# Patient Record
Sex: FEMALE | Race: WHITE | NOT HISPANIC OR LATINO | Employment: OTHER | URBAN - METROPOLITAN AREA
[De-identification: names, ages, dates, MRNs, and addresses within clinical notes are randomized per-mention and may not be internally consistent; named-entity substitution may affect disease eponyms.]

---

## 2017-01-17 ENCOUNTER — TRANSFERRED RECORDS (OUTPATIENT)
Dept: HEALTH INFORMATION MANAGEMENT | Facility: CLINIC | Age: 62
End: 2017-01-17

## 2017-07-26 DIAGNOSIS — F32.A DEPRESSION, UNSPECIFIED DEPRESSION TYPE: ICD-10-CM

## 2017-07-26 NOTE — LETTER
July 27, 2017      TO: Poornima Hilton  [address] [place] [address]     Dear Ms. Soniah Lida,    This letter is a reminder that you are overdue to see your Primary Care Provider for an Annual Visit and needed labs. You must be seen by your Primary Care Provider on a yearly basis and have appropriate labs drawn for continued care and prescription refills. Please call [phone] to schedule an appointment for an Annual Visit with EDGAR LOPEZ MD.  LAST VISIT 7/20/16    You have been given a 30 day supply/refill of your buPROPion (WELLBUTRIN SR) 100 MG   while you get your clinic visit/labs completed.    [facility],  Primary Care Center

## 2017-07-27 RX ORDER — BUPROPION HYDROCHLORIDE 100 MG/1
100 TABLET, EXTENDED RELEASE ORAL 2 TIMES DAILY
Qty: 60 TABLET | Refills: 0 | Status: SHIPPED | OUTPATIENT
Start: 2017-07-27 | End: 2017-09-22

## 2017-07-27 NOTE — TELEPHONE ENCOUNTER
buPROPion        Last Written Prescription Date:  7/20/16  Last Fill Quantity: 200,   # refills: 3  Last Office Visit : 7/20/16  Future Office visit:  NONE  OVERDUE MD APPT.  LETTER + 30 DAY RF

## 2017-08-21 ENCOUNTER — TELEPHONE (OUTPATIENT)
Dept: ORTHOPEDICS | Facility: CLINIC | Age: 62
End: 2017-08-21

## 2017-08-21 ENCOUNTER — PRE VISIT (OUTPATIENT)
Dept: ORTHOPEDICS | Facility: CLINIC | Age: 62
End: 2017-08-21

## 2017-08-21 NOTE — TELEPHONE ENCOUNTER
Patient calls in this am to get an appointment with an orthopedic surgeon. On the night of 8/19/2017, the patient stepped on a stone at a campsite, rolled her ankle. She sustained a closed trimalleolar left ankle fracture. She was initially seen and hospitalized in [place] where she had the ankle reduced and splinted. Poornima lives in the [facility] and would like to have her ankle fixed at the [place]. She will be discharged from the hospital today.   It was recommended that she hand carry all of the images, ED notes, and the hospital records. An appointment was scheduled for 0700 with Dr. Watson on 8/22/2017.

## 2017-08-21 NOTE — TELEPHONE ENCOUNTER
1.  Date/reason for appt: 8/22/17 7AM L ankle fx-images and records will be hand carried by the pt   2.  Referring provider: Self   3.  Call to patient (Yes / No - short description): Yes, called and spoke with pt. Emailing SALLIE form for pt to sign. - 1st attempt   4.  Previous care at / records requested from:  [facility] Severson MD / Evelyn (Ortho Surgeon) - Emailed SALLIE ([email or web address])

## 2017-08-21 NOTE — TELEPHONE ENCOUNTER
Called [facility] and spoke with Rosi. She will fax recs right away and push images through to [facility].

## 2017-08-22 ENCOUNTER — OFFICE VISIT (OUTPATIENT)
Dept: ORTHOPEDICS | Facility: CLINIC | Age: 62
End: 2017-08-22

## 2017-08-22 ENCOUNTER — ANESTHESIA EVENT (OUTPATIENT)
Dept: SURGERY | Facility: CLINIC | Age: 62
End: 2017-08-22

## 2017-08-22 ENCOUNTER — OFFICE VISIT (OUTPATIENT)
Dept: SURGERY | Facility: CLINIC | Age: 62
End: 2017-08-22

## 2017-08-22 VITALS
BODY MASS INDEX: 24.99 KG/M2 | HEIGHT: 65 IN | WEIGHT: 150 LBS | RESPIRATION RATE: 16 BRPM | OXYGEN SATURATION: 100 % | DIASTOLIC BLOOD PRESSURE: 78 MMHG | TEMPERATURE: 98 F | SYSTOLIC BLOOD PRESSURE: 124 MMHG | HEART RATE: 77 BPM

## 2017-08-22 VITALS — BODY MASS INDEX: 24.99 KG/M2 | HEIGHT: 65 IN | WEIGHT: 150 LBS

## 2017-08-22 DIAGNOSIS — Z01.818 PREOP EXAMINATION: Primary | ICD-10-CM

## 2017-08-22 DIAGNOSIS — Z86.69 HISTORY OF GUILLAIN-BARRE SYNDROME: ICD-10-CM

## 2017-08-22 DIAGNOSIS — H40.1231 NORMAL TENSION GLAUCOMA OF BOTH EYES, MILD STAGE: ICD-10-CM

## 2017-08-22 DIAGNOSIS — S82.892A CLOSED LEFT ANKLE FRACTURE, INITIAL ENCOUNTER: Primary | ICD-10-CM

## 2017-08-22 RX ORDER — OXYCODONE AND ACETAMINOPHEN 5; 325 MG/1; MG/1
1-2 TABLET ORAL EVERY 4 HOURS PRN
COMMUNITY
End: 2017-09-22

## 2017-08-22 RX ORDER — LATANOPROST 50 UG/ML
1 SOLUTION/ DROPS OPHTHALMIC AT BEDTIME
Qty: 7.5 ML | Refills: 1 | Status: SHIPPED | OUTPATIENT
Start: 2017-08-22 | End: 2018-01-02

## 2017-08-22 ASSESSMENT — ENCOUNTER SYMPTOMS: DEPRESSION: 1

## 2017-08-22 NOTE — MR AVS SNAPSHOT
After Visit Summary   8/22/2017    Poornima Hilton    MRN: [id number]           Patient Information     Date Of Birth          1955        Visit Information        Provider Department      8/22/2017 7:00 AM Scooter Watson MD [facility]        Today's Diagnoses     Closed left ankle fracture, initial encounter    -  1       Follow-ups after your visit        Your next 10 appointments already scheduled     Sep 13, 2017 11:30 AM CDT   (Arrive by 11:15 AM)   Post-Op with  U Ortho Nurse   [facility] ([facility])    [address] [place] [address]   [phone]            Sep 22, 2017 12:40 PM CDT   (Arrive by 12:25 PM)   PHYSICAL with Maxim Marcum MD   [facility] ([facility])    [address] [place] [address]   [phone]            Oct 10, 2017  9:10 AM CDT   (Arrive by 8:55 AM)   POST-OP FOOT/ANKLE with Scooter Watson MD   [facility] ([facility])    [address] [place] [address]   [phone]            Nov 02, 2017  9:15 AM CDT   VISUAL FIELD with [facility] EYE VISUAL FIELD   Eye Clinic ([facility])    [address]  [address]  [facility] [address]   [phone]            Nov 02, 2017 10:00 AM CDT   RETURN GLAUCOMA with Felicia Abreu MD   Eye Clinic ([facility])    [place]  [address]  [facility] [address]   [phone]              Who to contact     Please call your clinic at [phone] to:    Ask questions about your health    Make or cancel appointments    Discuss your medicines    Learn about your test results    Speak to your doctor   If you have compliments or concerns about an experience at your clinic, or if you wish to file a complaint,  "please contact [facility] Patient Relations at [phone] or email us at [email or web address]         Additional Information About Your Visit        exoro systemhart Information     [employer] gives you secure access to your electronic health record. If you see a primary care provider, you can also send messages to your care team and make appointments. If you have questions, please call your primary care clinic.  If you do not have a primary care provider, please call [phone] and they will assist you.      [employer] is an electronic gateway that provides easy, online access to your medical records. With [employer], you can request a clinic appointment, read your test results, renew a prescription or communicate with your care team.     To access your existing account, please contact your [facility] Clinic or call [phone] for assistance.        Care EveryWhere ID     This is your Care EveryWhere ID. This could be used by other organizations to access your [place] medical records  [id number]        Your Vitals Were     Height BMI (Body Mass Index)                1.651 m (5' 5\") 24.96 kg/m2           Blood Pressure from Last 3 Encounters:   08/22/17 124/78   07/20/16 122/80   05/09/14 127/78    Weight from Last 3 Encounters:   08/22/17 68 kg (150 lb)   08/22/17 68 kg (150 lb)   07/20/16 67.1 kg (148 lb)              We Performed the Following     Yaz-Operative Worksheet (Walter)          Today's Medication Changes          These changes are accurate as of: 8/22/17 11:59 PM.  If you have any questions, ask your nurse or doctor.               Start taking these medicines.        Dose/Directions    * order for DME   Used for:  Closed left ankle fracture, initial encounter   Started by:  Scooter Watson MD        Wheelchair with leg rests   Weight: 150 lbs  Height: 5'5\"   Quantity:  1 Units   Refills:  0       * order for DME   Used for:  Closed left ankle " fracture, initial encounter   Started by:  Watson, Scooter Tyrone, MD        Roll-A-Bout Walker. Patient can use for 2 months   Quantity:  1 Units   Refills:  0       * Notice:  This list has 2 medication(s) that are the same as other medications prescribed for you. Read the directions carefully, and ask your doctor or other care provider to review them with you.      These medicines have changed or have updated prescriptions.        Dose/Directions    glucosamine chondroitin 500 complex Caps   This may have changed:  when to take this   Used for:  Osteoarthritides        Dose:  1500 mg   Take 1,500 mg by mouth daily.   Quantity:  200 capsule   Refills:  99            Where to get your medicines      These medications were sent to [id number] - [place], [place] - [address] [facility]  [facility] [address], [place] [address]     Phone:  [phone]     latanoprost 0.005 % ophthalmic solution         Some of these will need a paper prescription and others can be bought over the counter.  Ask your nurse if you have questions.     Bring a paper prescription for each of these medications     order for DME    order for DME                Primary Care Provider Office Phone # Fax #    Maxim Marcum [phone]       [address] [place] [address]  [facility] [address]        Equal Access to Services     ERASMO GUERRIER AH: Hadii ruth berumeno Sojoe, waaxda luqadaha, qaybta kaalmada adeegyada, rehan kumar. So [facility] [phone].    ATENCIÓN: Si habla español, tiene a figueredo disposición servicios gratuitos de asistencia lingüística. Llame al [phone].    We comply with applicable federal civil rights laws and [place] laws. We do not discriminate on the basis of race, color, national origin, age, disability sex, sexual orientation or gender identity.            Thank you!     Thank you for choosing [facility] ORTHOP[facility]  "CLINIC  for your care. Our goal is always to provide you with excellent care. Hearing back from our patients is one way we can continue to improve our services. Please take a few minutes to complete the written survey that you may receive in the mail after your visit with us. Thank you!             Your Updated Medication List - Protect others around you: Learn how to safely use, store and throw away your medicines at www.disposemymeds.org.          This list is accurate as of: 8/22/17 11:59 PM.  Always use your most recent med list.                   Brand Name Dispense Instructions for use Diagnosis    aspirin 81 MG tablet      Take 1 tablet by mouth every morning    Routine general medical examination at a health care facility       buPROPion 100 MG 12 hr tablet    WELLBUTRIN SR    60 tablet    Take 1 tablet (100 mg) by mouth 2 times daily *LETTER TO PT*PLEASE SCHEDULE MD APPT.    Depression, unspecified depression type       calcium 600 MG tablet      Take 1 tablet by mouth 2 times daily.    Routine general medical examination at a health care facility       glucosamine chondroitin 500 complex Caps     200 capsule    Take 1,500 mg by mouth daily.    Osteoarthritides       latanoprost 0.005 % ophthalmic solution    XALATAN    7.5 mL    Place 1 drop into both eyes At Bedtime    Normal tension glaucoma of both eyes, mild stage       MULTIVITAMINS PO      Take by mouth every morning        * order for DME     1 Units    Wheelchair with leg rests   Weight: 150 lbs  Height: 5'5\"    Closed left ankle fracture, initial encounter       * order for DME     1 Units    Roll-A-Bout Walker. Patient can use for 2 months    Closed left ankle fracture, initial encounter       PERCOCET 5-325 MG per tablet   Generic drug:  oxyCODONE-acetaminophen      Take 1-2 tablets by mouth every 4 hours as needed for moderate to severe pain        TYLENOL 325 MG tablet   Generic drug:  acetaminophen      Take 1-2 tablets by mouth as needed.    "     * Notice:  This list has 2 medication(s) that are the same as other medications prescribed for you. Read the directions carefully, and ask your doctor or other care provider to review them with you.

## 2017-08-22 NOTE — NURSING NOTE
Teaching Flowsheet   Relevant Diagnosis: Left ankle fracture   Teaching Topic: Pre-op left ankle open reduction internal fixation      Person(s) involved in teaching:   Patient and      Motivation Level:  Asks Questions: Yes  Eager to Learn: Yes  Cooperative: Yes  Receptive (willing/able to accept information): Yes  Any cultural factors/Congregation beliefs that may influence understanding or compliance? No       Patient and Family demonstrates understanding of the following:  Reason for the appointment, diagnosis and treatment plan: Yes  Knowledge of proper use of medications and conditions for which they are ordered (with special attention to potential side effects or drug interactions): Yes  Which situations necessitate calling provider and whom to contact: Yes       Teaching Concerns Addressed:   Comments: Patient given order for wheelchair and roll-a-bout. Also given handicap parking form.  Will have surgery Friday at [facility].  Patient scheduling pre-op physical with primary today, they were instructed to contact us with any difficulty with this.       Proper use and care of post op cam boot (medical equip, care aids, etc.): Yes  Nutritional needs and diet plan: Yes  Pain management techniques: Yes  Wound Care: Yes  How and/when to access community resources: NA     Instructional Materials Used/Given: pre-op packet, TRIA packet, antiseptic soap, post-operative foot and ankle surgery instructions.      Time spent with patient: 15 minutes.

## 2017-08-22 NOTE — TELEPHONE ENCOUNTER
latanoprost (XALATAN) 0.005 % ophthalmic solution   Last Written Prescription Date:  5/13/16  Last Fill Quantity: 3 bottles,   # refills: 3  Last Office Visit with G, [facility] or [facility] prescribing provider: 12/1/16  Future Office visit:   11/2/17    Progress Notes      1)POAG/LTG vs Glc Suspect -- H/O DH OS, ??H/O Raynaud's per old notes, H/O low BP, anemia in past -- K pachy: 583/589   Tmax:20/19     HVF: Full c fluct     CDR: 0.7/0.8    HRT/OCT:tr RNFL thinning       FHX of Glc: Mother -- had surgery, lost vision     Gonio: open      Intolerant to:      Asthma/COPD: No  Steroid Use:No     Kidney Stones:  No   Sulfa Allergy: No     IOP targets:     Patient will continue on Latanoprost which is a teal top drop at bedtime in both eyes.  Patient will return to clinic in 8-12 months with repeat visual field test, OCT with RNFL analysis, and dilated eye exam.

## 2017-08-22 NOTE — NURSING NOTE
Reason For Visit:   Chief Complaint   Patient presents with     Musculoskeletal Problem     L ankle fx. DOI 8/19/17.           Pain Assessment  Patient Currently in Pain: No

## 2017-08-22 NOTE — H&P
Pre-Operative H & P     CC:  Preoperative exam to assess for increased cardiopulmonary risk while undergoing surgery and anesthesia.    Date of Encounter: 8/22/2017  Primary Care Physician:  Maxim Marcum  Poornima Hilton is a 62 year old female who presents for pre-operative H & P in preparation for left ankle open reduction internal fixation with Dr. Watson on 8/25/17 at [facility]. History is obtained from the patient.     Patient was camping last weekend and tripped on a rock at their campsite. She suffered a left ankle fracture and was initially seen in a local ED. She was then referred to [facility] where she underwent a closed reduction on 8/20/17. She then consulted with Dr. Watson today for further management. Above procedure now planned.    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 Symptoms began with peripheral neuropathy in feet moving into upper extremities. No further progression or respiratory issues. Neurologist at that time said that it may have been a mild case of Guillian [place]. Later some peripheral neuropathy returned and she had repeat evaluation which was neg. No symptoms since that time.     Patient is well followed by her PCP, Dr. Marcum.    Past Medical History  Past Medical History:   Diagnosis Date     Closed left ankle fracture 08/19/2017     Depressive disorder, not elsewhere classified     Depression (non-psychotic)     Guillaine Mountain syndrome 2000     Mild recurrent major depression (H) 10/1/2012     Osteoarthritis of carpometacarpal joint of thumb     bilaterally     Primary open angle glaucoma of both eyes, mild stage 12/01/2016       Past Surgical History  Past Surgical History:   Procedure Laterality Date     ORIF left ankle  08/20/2017       Hx of Blood transfusions/reactions: Denies.      Hx of abnormal bleeding or anti-platelet use: ASA 81 mg, on hold today per Dr. Watson.    Menstrual history: No LMP recorded. Patient is  "postmenopausal.    Steroid use in the last year: Denies.     Personal or FH with difficulty with Anesthesia:  Denies.    Prior to Admission Medications  Current Outpatient Prescriptions   Medication Sig Dispense Refill     latanoprost (XALATAN) 0.005 % ophthalmic solution Place 1 drop into both eyes At Bedtime 7.5 mL 1     oxyCODONE-acetaminophen (PERCOCET) 5-325 MG per tablet Take 1-2 tablets by mouth every 4 hours as needed for moderate to severe pain       buPROPion (WELLBUTRIN SR) 100 MG 12 hr tablet Take 1 tablet (100 mg) by mouth 2 times daily *LETTER TO PT*PLEASE SCHEDULE MD APPT. 60 tablet 0     Glucosamine-Chondroit-Vit C-Mn (GLUCOSAMINE CHONDR 500 COMPLEX) CAPS Take 1,500 mg by mouth daily. (Patient taking differently: Take 1,500 mg by mouth 2 times daily ) 200 capsule 99     Multiple Vitamin (MULTIVITAMINS PO) Take by mouth every morning        Calcium 600 MG tablet Take 1 tablet by mouth 2 times daily.       aspirin 81 MG tablet Take 1 tablet by mouth every morning        acetaminophen (TYLENOL) 325 MG tablet Take 1-2 tablets by mouth as needed.       order for DME Wheelchair with leg rests     Weight: 150 lbs   Height: 5'5\" 1 Units 0     order for DME Roll-A-Bout Walker. Patient can use for 2 months 1 Units 0     [DISCONTINUED] latanoprost (XALATAN) 0.005 % ophthalmic solution Place 1 drop into both eyes At Bedtime 3 Bottle 3       Allergies  No Known Allergies    Social History  Social History     Social History     Marital status: Unknown     Spouse name: N/A     Number of children: N/A     Years of education: N/A     Occupational History     Not on file.     Social History Main Topics     Smoking status: Never Smoker     Smokeless tobacco: Never Used     Alcohol use 3.0 oz/week     6 Standard drinks or equivalent per week      Comment: 3 per week     Drug use: No     Sexual activity: Yes     Partners: Male     Other Topics Concern     Exercise Yes     run/walk 2-3 x/week     Social History Narrative " "      Family History  Family History   Problem Relation Age of Onset     CEREBROVASCULAR DISEASE Mother      Depression Mother      Eye Disorder Mother      macular degeneration     Psychotic Disorder Mother      Respiratory Mother      Glaucoma Mother      Cancer - colorectal Father      Arthritis Father      Cardiovascular Father      HEART DISEASE Father      Thyroid Disease Father      Sleep Apnea Father      Eye Disorder Maternal Grandfather      Glaucoma Maternal Grandfather      Macular Degeneration Maternal Grandfather      CEREBROVASCULAR DISEASE Paternal Grandmother      Arthritis Paternal Grandmother      Unknown/Adopted Paternal Grandfather        Review of Systems    The complete review of systems is negative other than noted in the HPI or here.   Constitutional: Denies fever, chills, weight loss.  Skin: No open wounds at left ankle.  HEENT: Wears glasses for vision. No swallowing difficulty. Tip of tongue has been numb since last surgery 8/20/17 but is improving.  Respiratory: Denies cough or shortness of breath. No concern for BECCA.   CV: Denies chest pain or irregular HR. Good exercise tolerance before injury. Recent dehydration while on vacation with dizziness. Rehydrated and improved. Some dizziness persists when she hyperextends neck.   GI: Denies abdominal pain or bowel issues.  : Denies dysuria.  M/S: OA in bilateral thumbs. Has been taking Tylenol for pain at left ankle.    Temp: 98  F (36.7  C) Temp src: Oral BP: 124/78 Pulse: 77   Resp: 16 SpO2: 100 %         150 lbs 0 oz  5' 5\"   Body mass index is 24.96 kg/(m^2).       Physical Exam  Constitutional: Awake, alert, cooperative, no apparent distress, and appears stated age. Accompanied by SO.  Eyes: Pupils equal, round and reactive to light, extra ocular muscles intact, sclera clear, conjunctiva normal. Glasses on.  HENT: Normocephalic, oral pharynx with moist mucus membranes, good dentition. No goiter appreciated. No redness, irritation or " obvious lesions near tip of tongue.   Respiratory: Clear to auscultation bilaterally, no crackles or wheezing. No cough or obvious dyspnea.  Cardiovascular: Regular rate and rhythm, normal S1 and S2, and no murmur noted. Carotids, no bruits. No edema. Palpable pulses to radial and right DP and PT arteries.   GI: Normal bowel sounds, soft, non-distended, non-tender, no masses palpated.  Lymph/Hematologic: No cervical lymphadenopathy and no supraclavicular lymphadenopathy.  Genitourinary: Deferred.  Skin: Warm and dry.    Musculoskeletal: Full ROM of neck. Left ankle wrapped. Toes pink in color and able to move freely. Gross motor strength is normal.    Neurologic: Awake, alert, oriented to name, place and time.   Neuropsychiatric: Calm, cooperative. Normal affect.     Labs: Not indicated.  EKG: Personally reviewed: 8/22/17 Normal sinus rhythm    Xray left ankle 8/22/17  Impression:  1. Redemonstration of trimalleolar fractures, with interval reduction  of the tibiotalar joint.  2. Minimally displaced posterior malleolus fracture fragment.     Outside records reviewed from: Care Everywhere    ASSESSMENT and PLAN  Poornima Hilton is a 62 year old female scheduled to undergo left ankle open reduction internal fixation by Dr. Watson on 8/25/17 at [facility]. She has the following specific operative considerations:   - RCRI : No serious cardiac risks.    - Risk of PONV score = 3. If > 2, anti-emetic intervention recommended.     --Left ankle fracture on 8/19/17 s/p closed reduction on 8/20/17. Consult with Dr. Watson with above procedure now planned. Currently using Tylenol primarily for pain.   --Some numbness at tip of tongue since last GA, improving slowly.    --Generally healthy female with no significant cardiopulmonary history. Nonsmoker. Good activity tolerance. ASA on hold for surgery beginning today.   --Remote history of Guillain [place] with recent negative evaluation. No active symptoms.   --Depression. Wellbutrin TWICE  DAILY.     Patient is optimized and is acceptable candidate for the proposed procedure.  No further diagnostic evaluation is needed.     MAHOGANY Chambers CNS  Preoperative Assessment Center  [facility]  Clinic and Surgery Center  Phone: [phone]  Fax: [phone]

## 2017-08-22 NOTE — LETTER
8/22/2017       RE: Poornima Hilton  [address] [place] [address]     Dear Colleague,    Thank you for referring your patient, Poornima Hilton, to the [facility] ORTHOP[facility]IC at [facility]. Please see a copy of my visit note below.    CHIEF COMPLAINT:  Left ankle trimalleolar fracture sustained on 08/19/2017.       HISTORY OF PRESENT ILLNESS:  Ms. Hilton presents today for evaluation of her left ankle fracture.  She sustained an injury while stepping on a rock in a campground.  Eventually, she presented to the local ER where an attempt at closed reduction was made which was not successful and she required to go to the hospital where she had a closed reduction under general anesthetic.  Since then, the patient has been nonweightbearing and splinted.  Denies to have any problems in his ankle prior to this injury.        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        PAST MEDICAL HISTORY:  Depression, glaucoma.      PAST SURGICAL HISTORY:  None.      DRUG ALLERGIES:  None.      MEDICATIONS:  Please refer to encounter form.      PHYSICAL EXAMINATION:  On today's visit, she presents as a pleasant female in no apparent distress in the accompaniment of her .  Reports to have a height of 5 feet 5 inches and a weight of 150 pounds.  Denies to have any constitutional symptoms.      On today's visit, she presents with wrinkleable skin along the lateral aspect of the ankle joint.  Presents with a well-reduced ankle joint.  Motion was not tested secondary to pain.  CMS is grossly intact.      RADIOGRAPHIC EVALUATION:  Plain x-rays were obtained today in order to confirm the reduction which are absolutely satisfactory.  Presents with an excellent location of the ankle joint.      ASSESSMENT:  Left ankle trimalleolar fracture.      PLAN:  I discussed with the patient and her  that at this point the recommendation is to proceed with  open reduction internal fixation of the left ankle.  I discussed with them the most likely postoperative course and complications which include but are not limited to infection, bleeding, nerve damage, residual pain, nonunion and stiffness.      All questions were answered.  The patient was pleased with the discussion.  The patient will follow up accordingly.  On today's visit, she was given a prescription for a wheelchair as well as a handicap parking permit.      The patient will follow up accordingly.      TT 30 minutes, CT 20 minutes.     Again, thank you for allowing me to participate in the care of your patient.      Sincerely,    Scooter Watson MD

## 2017-08-22 NOTE — PROGRESS NOTES
CHIEF COMPLAINT:  Left ankle trimalleolar fracture sustained on 08/19/2017.       HISTORY OF PRESENT ILLNESS:  Ms. Hilton presents today for evaluation of her left ankle fracture.  She [place] an [place]  Eventually, she presented to the local ER where an attempt at closed reduction was made which was not successful and she required to go to the hospital where she had a closed reduction under general anesthetic.  Since then, the patient has been nonweightbearing and splinted.  Denies to have any problems in his ankle prior to this injury.        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        PAST MEDICAL HISTORY:  Depression, glaucoma.      PAST SURGICAL HISTORY:  None.      DRUG ALLERGIES:  None.      MEDICATIONS:  Please refer to encounter form.      PHYSICAL EXAMINATION:  On today's visit, she presents as a pleasant female in no apparent distress in the accompaniment of her .  Reports to have a height of 5 feet 5 inches and a weight of 150 pounds.  Denies to have any constitutional symptoms.      On today's visit, she presents with wrinkleable skin along the lateral aspect of the ankle joint.  Presents with a well-reduced ankle joint.  Motion was not tested secondary to pain.  CMS is grossly intact.      RADIOGRAPHIC EVALUATION:  Plain x-rays were obtained today in order to confirm the reduction which are absolutely satisfactory.  Presents with an excellent location of the ankle joint.      ASSESSMENT:  Left ankle trimalleolar fracture.      PLAN:  I discussed with the patient and her  that at this point the recommendation is to proceed with open reduction internal fixation of the left ankle.  I discussed with them the most likely postoperative course and complications which include but are not limited to infection, bleeding, nerve damage, residual pain, nonunion and stiffness.      All questions were answered.  The  patient was pleased with the discussion.  The patient will follow up accordingly.  On today's visit, she was given a prescription for a wheelchair as well as a handicap parking permit.      The patient will follow up accordingly.      TT 30 minutes, CT 20 minutes.

## 2017-08-23 ENCOUNTER — TELEPHONE (OUTPATIENT)
Dept: ORTHOPEDICS | Facility: CLINIC | Age: 62
End: 2017-08-23

## 2017-08-23 LAB — INTERPRETATION ECG - MUSE: NORMAL

## 2017-08-23 NOTE — TELEPHONE ENCOUNTER
[facility]:  Nursing Note  SITUATION                                                      Poornima Hilton is a 62 year old female who calls in with numbness in the top of left foot.    BACKGROUND                                                      Patient is is being treated for a left trimalleolar ankle fracture.    Date of last clinic appointment: on 8/22/2017 with Dr. Watson    Does patient have a future appointment scheduled?  Yes -  next appointment is on: 8/25/2017 surgery with Dr. Watson        ASSESSMENT      Poornima calls in today with complaints of the top of her left foot feeling numb. She states she can feel and move all 5 toes, the are pink and warm. She complains of the splint feeling tight on the lateral side of her small toe. She states that her ankle feels swollen. Poornima states that while she was in the hospital they cut the splint open and re-wrapped it. She states that she has been elevating and icing the ankle as instructed.    PLAN                                                      Nursing Interventions: Recommended patient have assistance to loosen the ace wrap around the splint, encouraged to wiggle toes, elevate above the level of the heart, and apply ice  on Patient educated on the signs and symptoms of compartment syndrome and to go to the ED if she experiences these symptoms.  RECOMMENDED DISPOSITION: See above  Will comply with recommendation: Yes    Patient/family can be reached at: [phone] .  Okay to leave a detailed message at this number?  Yes    If further questions/concerns or if symptoms do not improve, worsen or new symptoms develop, patient/family are advised to call [phone], option #3 to speak with a triage nurse, as soon as possible.    Alana Pham

## 2017-08-25 ENCOUNTER — TRANSFERRED RECORDS (OUTPATIENT)
Dept: HEALTH INFORMATION MANAGEMENT | Facility: CLINIC | Age: 62
End: 2017-08-25

## 2017-08-28 ENCOUNTER — TELEPHONE (OUTPATIENT)
Dept: ORTHOPEDICS | Facility: CLINIC | Age: 62
End: 2017-08-28

## 2017-08-28 DIAGNOSIS — S82.899A ANKLE FRACTURE: Primary | ICD-10-CM

## 2017-08-28 RX ORDER — HYDROCODONE BITARTRATE AND ACETAMINOPHEN 5; 325 MG/1; MG/1
1 TABLET ORAL EVERY 6 HOURS PRN
Qty: 30 TABLET | Refills: 0 | Status: SHIPPED | OUTPATIENT
Start: 2017-08-28 | End: 2017-10-10

## 2017-08-28 NOTE — TELEPHONE ENCOUNTER
Poornima left a message requesting a refill on her percocet. She states that she is only taking the percocet at night, the pain gets to 6/0-10 scale at the maximum. During the day the tylenol and ibuprofen are sufficient.   Suggested to go down to vicodin instead of percocet. Patient would like to use vicodin, a prescription was generated.   Recommended that she does not take more than 3,000 mg of tylenol in 24 hours. Also encouraged to continue to ice and elevate to assist with pain management. Patient states that  will  prescription today after 1200.

## 2017-08-28 NOTE — TELEPHONE ENCOUNTER
[facility]:  Nursing Note  SITUATION                                                      Poornima Hilton is a 62 year old female who calls with refill request for Percocet. Alternating Tylenol and Ibuprofen during the day and taking Percocet at bedtime.  will  Rx, Quentin Hilton.     BACKGROUND                                                      Patient is s/p left ankle open reduction internal fixation on 8.25.17 at [facility].    Date of last clinic appointment: on 8.22.17 with Dr. Watson    Does patient have a future appointment scheduled?  Yes -  next appointment is on: 9.13.17.      PLAN                                                      Nursing Interventions: In-basket staff message sent to Ortho Triage for futher follow-up.    Patient/family can be reached at: [phone].  Okay to leave a detailed message at this number?  Yes        Shantell Fischer

## 2017-08-29 ENCOUNTER — TELEPHONE (OUTPATIENT)
Dept: ORTHOPEDICS | Facility: CLINIC | Age: 62
End: 2017-08-29

## 2017-08-29 NOTE — TELEPHONE ENCOUNTER
Called patient back in regards to her questions after surgery. She stated that the numbness in her toes gets better with elevation and when she moves her toes, she is told that it is not abnormal to have numbness in tingling in her foot due to the block that she had during surgery. She feels that the wrap might be a little tight as well. She is instructed on re-wrapping the outer wrap of her dressing. She stated that she feels this will help and is agreeable with plan.  She will call with any further questions or concerns that may arise prior to her return appointment.

## 2017-08-29 NOTE — TELEPHONE ENCOUNTER
"Orthopedic pt of Dr. Watson s/p (L) ankle trimalleolar fracture sustained on 08/19/2017, and left ankle open reduction internal fixation on 8.25.17 at [facility]. Poornima calls today with concern about intermittent numbness in her toes. Color and warmth OK. She wonders if the \"wrap is wrong\". She also feels her boot may not be fitting correctly. Next f/u scheduled for 10/10 and she feels she needs to be seen sooner.  Will forward this update and concern to Dr. Watson's team. In Basket message sent.  "

## 2017-09-07 ENCOUNTER — OFFICE VISIT (OUTPATIENT)
Dept: ORTHOPEDICS | Facility: CLINIC | Age: 62
End: 2017-09-07

## 2017-09-07 DIAGNOSIS — Z98.890 S/P ORIF (OPEN REDUCTION INTERNAL FIXATION) FRACTURE: Primary | ICD-10-CM

## 2017-09-07 DIAGNOSIS — Z87.81 S/P ORIF (OPEN REDUCTION INTERNAL FIXATION) FRACTURE: Primary | ICD-10-CM

## 2017-09-07 ASSESSMENT — ENCOUNTER SYMPTOMS
DEPRESSION: 1
ARTHRALGIAS: 1
STIFFNESS: 0
NIGHT SWEATS: 0
CHILLS: 0
INSOMNIA: 1
NERVOUS/ANXIOUS: 1
WEIGHT GAIN: 0
INCREASED ENERGY: 0
WEIGHT LOSS: 0
ALTERED TEMPERATURE REGULATION: 0
PANIC: 0
DECREASED APPETITE: 0
DECREASED CONCENTRATION: 0
MYALGIAS: 0
MUSCLE CRAMPS: 0
FEVER: 0
NECK PAIN: 0
JOINT SWELLING: 1
POLYPHAGIA: 0
POLYDIPSIA: 0
BACK PAIN: 1
MUSCLE WEAKNESS: 0
HALLUCINATIONS: 0
FATIGUE: 1

## 2017-09-07 ASSESSMENT — PAIN SCALES - GENERAL: PAINLEVEL: MODERATE PAIN (4)

## 2017-09-07 NOTE — PROGRESS NOTES
"Reason for visit:    Poornima Hilton came in to the clinic for a two week post op check.    Her surgery was done 8/25/2017 by Dr Watson.  She had ORIF left ankle     Assessment:    Poornima came into the clinic in a wheel chair with a cam boot in place WBAT with cam boot in place. She has not been putting much weight on the left leg due to pain.    The Surgical wounds were exposed and found to be without evidence of infection; so the sutures were removed and steri-strips applied to incisions. There are palpable pedal pulses and there is modest swelling. She states she is having \"zingers in the foot\".     Plan:     Tubi- size E was placed on the left leg and she was placed back into the cam boot. She was told to begin WBAT with cam boot in place and begin PT.  Patient and  brought a Roll about scooter in with them and asked for assistance in sizing it to Poornima. They were instructed on how to adjust it and to what height it should be at for her.      She has an appointment to see Dr. Watson at 6 weeks and at that time Dr. Watson will determine further restrictions.    She has our phone number and will call with questions or problems.      "

## 2017-09-07 NOTE — MR AVS SNAPSHOT
After Visit Summary   9/7/2017    Poornima Hilton    MRN: [id number]           Patient Information     Date Of Birth          1955        Visit Information        Provider Department      9/7/2017 10:00 AM Nurse, [facility]        Today's Diagnoses     S/P ORIF (open reduction internal fixation) fracture    -  1       Follow-ups after your visit        Additional Services     PHYSICAL THERAPY REFERRAL (External-Prints)       Physical Therapy Referral                  Follow-up notes from your care team     Return in about 5 weeks (around 10/10/2017).      Your next 10 appointments already scheduled     Sep 08, 2017  8:20 AM CDT   (Arrive by 8:05 AM)   CARLO Extremity with Tavo Pennington PT   [facility] Physical [facility] CARLO ([facility])    [address] [place] [address] [id number] [phone]            Sep 15, 2017  8:20 AM CDT   CARLO Extremity with Tavo Pennington PT   [facility] CARLO ([facility])    [address] [place] [id number] [phone]            Sep 22, 2017 12:40 PM CDT   (Arrive by 12:25 PM)   PHYSICAL with Maxim Marcum MD   [facility] ([facility])    [address] [place] [id number] [phone]            Sep 22, 2017  1:30 PM CDT   CARLO Extremity with Tavo Pennington PT   [facility] CARLO ([facility])    [address] [place] [id number] [phone]            Oct 10, 2017  9:10 AM CDT   (Arrive by 8:55 AM)   POST-OP FOOT/ANKLE with Scooter Watson MD   [facility] ([facility])    [address] [place] [id number] [phone]            Nov 02, 2017  9:15 AM CDT   VISUAL FIELD with [facility] EYE VISUAL FIELD   Eye Clinic ([facility])    [address]  [facility] [address]   [phone]            Nov 02, 2017 10:00 AM CDT   RETURN GLAUCOMA with Felicia Abreu MD   Eye Clinic ([facility])    [address]  [address]  [facility] [address]   [phone]              Who to contact     Please call your clinic at [phone] to:    Ask questions about your health    Make or cancel appointments    Discuss your medicines    Learn about your test results    Speak to your doctor   If you have compliments or concerns about an experience at your clinic, or if you wish to file a complaint, please contact [facility] Patient Relations at [phone] or email us at [email or web address]         Additional Information About Your Visit        Foodini[employer]BuzzSpice Information     [facility] [employer] gives you secure access to your electronic health record. If you see a primary care provider, you can also send messages to your care team and make appointments. If you have questions, please call your primary care clinic.  If you do not have a primary care provider, please call [phone] and they will assist you.      Nine[employer]Five is an electronic gateway that provides easy, online access to your medical records. With Nine[employer]Five, you can request a clinic appointment, read your test results, renew a prescription or communicate with your care team.     To access your existing account, please contact your [facility] or call [phone] for assistance.        Care EveryWhere ID     This is your Care EveryWhere ID. This could be used by other organizations to access your [place] medical records  [id number]         Blood Pressure from Last 3 Encounters:   08/22/17 124/78   07/20/16 122/80   05/09/14 127/78    Weight from Last 3 Encounters:   08/22/17 68 kg (150 lb)   08/22/17 68 kg (150 lb)   07/20/16 67.1 kg (148 lb)               We Performed the Following     PHYSICAL THERAPY REFERRAL (External-Prints)          Today's Medication Changes          These changes are accurate as of: 9/7/17 10:29 AM.  If you have any questions, ask your nurse or doctor.               These medicines have changed or have updated prescriptions.        Dose/Directions    glucosamine chondroitin 500 complex Caps   This may have changed:  when to take this   Used for:  Osteoarthritides        Dose:  1500 mg   Take 1,500 mg by mouth daily.   Quantity:  200 capsule   Refills:  99                Primary Care Provider Office Phone # Fax #    Maxim Anselmo Marcum [phone]       [address] [place] [address]  [facility] [address]        Equal Access to Services     [facility]: Hadglenn Cooley, treasure jiang, piyush schroeder, rehan madera . So [facility] [phone].    ATENCIÓN: Si habla español, tiene a figueredo disposición servicios gratuitos de asistencia lingüística. [facility] [phone].    We comply with applicable federal civil rights laws and [place] laws. We do not discriminate on the basis of race, color, national origin, age, disability sex, sexual orientation or gender identity.            Thank you!     Thank you for choosing [facility]  for your care. Our goal is always to provide you with excellent care. Hearing back from our patients is one way we can continue to improve our services. Please take a few minutes to complete the written survey that you may receive in the mail after your visit with us. Thank you!             Your Updated Medication List - Protect others around you: Learn how to safely use, store and throw away your medicines at www.disposemymeds.org.          This list is accurate as of: 9/7/17 10:29 AM.  Always use your most recent med list.                   Brand Name Dispense Instructions for use Diagnosis    aspirin 81 MG tablet      Take 1  "tablet by mouth every morning    Routine general medical examination at a health care facility       buPROPion 100 MG 12 hr tablet    WELLBUTRIN SR    60 tablet    Take 1 tablet (100 mg) by mouth 2 times daily *LETTER TO PT*PLEASE SCHEDULE MD APPT.    Depression, unspecified depression type       calcium 600 MG tablet      Take 1 tablet by mouth 2 times daily.    Routine general medical examination at a health care facility       glucosamine chondroitin 500 complex Caps     200 capsule    Take 1,500 mg by mouth daily.    Osteoarthritides       HYDROcodone-acetaminophen 5-325 MG per tablet    NORCO    30 tablet    Take 1 tablet by mouth every 6 hours as needed for moderate to severe pain    Ankle fracture       latanoprost 0.005 % ophthalmic solution    XALATAN    7.5 mL    Place 1 drop into both eyes At Bedtime    Normal tension glaucoma of both eyes, mild stage       MULTIVITAMINS PO      Take by mouth every morning        * order for DME     1 Units    Wheelchair with leg rests   Weight: 150 lbs  Height: 5'5\"    Closed left ankle fracture, initial encounter       * order for DME     1 Units    Roll-A-Bout Walker. Patient can use for 2 months    Closed left ankle fracture, initial encounter       PERCOCET 5-325 MG per tablet   Generic drug:  oxyCODONE-acetaminophen      Take 1-2 tablets by mouth every 4 hours as needed for moderate to severe pain        TYLENOL 325 MG tablet   Generic drug:  acetaminophen      Take 1-2 tablets by mouth as needed.        * Notice:  This list has 2 medication(s) that are the same as other medications prescribed for you. Read the directions carefully, and ask your doctor or other care provider to review them with you.      "

## 2017-09-08 ENCOUNTER — THERAPY VISIT (OUTPATIENT)
Dept: PHYSICAL THERAPY | Facility: CLINIC | Age: 62
End: 2017-09-08
Payer: COMMERCIAL

## 2017-09-08 DIAGNOSIS — S82.899A ANKLE FRACTURE: Primary | ICD-10-CM

## 2017-09-08 DIAGNOSIS — Z98.890 S/P ORIF (OPEN REDUCTION INTERNAL FIXATION) FRACTURE: ICD-10-CM

## 2017-09-08 DIAGNOSIS — Z87.81 S/P ORIF (OPEN REDUCTION INTERNAL FIXATION) FRACTURE: ICD-10-CM

## 2017-09-08 PROCEDURE — 97110 THERAPEUTIC EXERCISES: CPT | Mod: GP

## 2017-09-08 PROCEDURE — 97161 PT EVAL LOW COMPLEX 20 MIN: CPT | Mod: GP

## 2017-09-08 NOTE — MR AVS SNAPSHOT
After Visit Summary   9/8/2017    Poornima Hilton    MRN: [id number]           Patient Information     Date Of Birth          1955        Visit Information        Provider Department      9/8/2017 8:20 AM Tavo Pennington PT [facility] CARLO        Today's Diagnoses     Ankle fracture    -  1    S/P ORIF (open reduction internal fixation) fracture           Follow-ups after your visit        Your next 10 appointments already scheduled     Sep 15, 2017  8:20 AM CDT   CARLO Extremity with Tavo Pennington PT   [facility] ([facility])    [address] [place] [id number] [phone]            Sep 22, 2017 12:40 PM CDT   (Arrive by 12:25 PM)   PHYSICAL with Maxim Marcum MD   [facility] ([facility])    [address] [place] [id number] [phone]            Sep 22, 2017  1:30 PM CDT   CARLO Extremity with Tavo Pennington PT   [facility] CAR[facility] ([facility])    [address] [place] [id number] [phone]            Oct 10, 2017  9:10 AM CDT   (Arrive by 8:55 AM)   POST-OP FOOT/ANKLE with Scooter Watson MD   [facility] ([facility])    [address] [place] [address] [id number] [phone]            Nov 02, 2017  9:15 AM CDT   VISUAL FIELD with [facility] EYE VISUAL FIELD   Eye Clinic ([facility])    [address]  [address]   [address]a  [facility] [address]   [phone]            Nov 02, 2017 10:00 AM CDT   RETURN GLAUCOMA with Felicia Abreu MD   Eye Clinic ([facility])    [address]  [address]   [address]  [facility] [address]   [phone]              Who to contact     If you have questions or need follow up information about today's  clinic visit or your schedule please contact [facility] PHYSICAL THERAPY CARLO directly at [phone].  Normal or non-critical lab and imaging results will be communicated to you by ZMPhart, letter or phone within 4 business days after the clinic has received the results. If you do not hear from us within 7 days, please contact the clinic through brettapprovedt or phone. If you have a critical or abnormal lab result, we will notify you by phone as soon as possible.  Submit refill requests through link bird or call your pharmacy and they will forward the refill request to us. Please allow 3 business days for your refill to be completed.          Additional Information About Your Visit        ZMPharVinted Information     link bird gives you secure access to your electronic health record. If you see a primary care provider, you can also send messages to your care team and make appointments. If you have questions, please call your primary care clinic.  If you do not have a primary care provider, please call [phone] and they will assist you.        Care EveryWhere ID     This is your Care EveryWhere ID. This could be used by other organizations to access your [place] medical records  [id number]         Blood Pressure from Last 3 Encounters:   08/22/17 124/78   07/20/16 122/80   05/09/14 127/78    Weight from Last 3 Encounters:   08/22/17 68 kg (150 lb)   08/22/17 68 kg (150 lb)   07/20/16 67.1 kg (148 lb)              We Performed the Following     HC PT EVAL, LOW COMPLEXITY     CARLO INITIAL EVAL REPORT     THERAPEUTIC EXERCISES          Today's Medication Changes          These changes are accurate as of: 9/8/17  8:52 AM.  If you have any questions, ask your nurse or doctor.               These medicines have changed or have updated prescriptions.        Dose/Directions    glucosamine chondroitin 500 complex Caps   This may have changed:  when to take this   Used for:  Osteoarthritides        Dose:  1500 mg   Take 1,500 mg by  mouth daily.   Quantity:  200 capsule   Refills:  99                Primary Care Provider Office Phone # Fax #    Maxim Anselmo Marcum [phone]       [address] [place] [id number]        Equal Access to Services     ERASMO GUERRIER : Irena hoyos lynnette Sojoe, waaxda luqadaha, qaybta kaalmada adezarada, rehan cynin hayaajahaira gonzalez ludamely kumar. So [facility] [phone].    ATENCIÓN: Si habla español, tiene a figueredo disposición servicios gratuitos de asistencia lingüística. Llame al [phone].    We comply with applicable federal civil rights laws and [place] laws. We do not discriminate on the basis of race, color, national origin, age, disability sex, sexual orientation or gender identity.            Thank you!     Thank you for choosing [facility] PHYSICAL THERAPY [facility]  for your care. Our goal is always to provide you with excellent care. Hearing back from our patients is one way we can continue to improve our services. Please take a few minutes to complete the written survey that you may receive in the mail after your visit with us. Thank you!             Your Updated Medication List - Protect others around you: Learn how to safely use, store and throw away your medicines at www.disposemymeds.org.          This list is accurate as of: 9/8/17  8:52 AM.  Always use your most recent med list.                   Brand Name Dispense Instructions for use Diagnosis    aspirin 81 MG tablet      Take 1 tablet by mouth every morning    Routine general medical examination at a health care facility       buPROPion 100 MG 12 hr tablet    WELLBUTRIN SR    60 tablet    Take 1 tablet (100 mg) by mouth 2 times daily *LETTER TO PT*PLEASE SCHEDULE MD APPT.    Depression, unspecified depression type       calcium 600 MG tablet      Take 1 tablet by mouth 2 times daily.    Routine general medical examination at a health care facility       glucosamine chondroitin 500 complex Caps     200 capsule     "Take 1,500 mg by mouth daily.    Osteoarthritides       HYDROcodone-acetaminophen 5-325 MG per tablet    NORCO    30 tablet    Take 1 tablet by mouth every 6 hours as needed for moderate to severe pain    Ankle fracture       latanoprost 0.005 % ophthalmic solution    XALATAN    7.5 mL    Place 1 drop into both eyes At Bedtime    Normal tension glaucoma of both eyes, mild stage       MULTIVITAMINS PO      Take by mouth every morning        * order for DME     1 Units    Wheelchair with leg rests   Weight: 150 lbs  Height: 5'5\"    Closed left ankle fracture, initial encounter       * order for DME     1 Units    Roll-A-Bout Walker. Patient can use for 2 months    Closed left ankle fracture, initial encounter       PERCOCET 5-325 MG per tablet   Generic drug:  oxyCODONE-acetaminophen      Take 1-2 tablets by mouth every 4 hours as needed for moderate to severe pain        TYLENOL 325 MG tablet   Generic drug:  acetaminophen      Take 1-2 tablets by mouth as needed.        * Notice:  This list has 2 medication(s) that are the same as other medications prescribed for you. Read the directions carefully, and ask your doctor or other care provider to review them with you.      "

## 2017-09-08 NOTE — PROGRESS NOTES
Subjective:    Patient is a 62 year old female presenting with rehab left ankle/foot hpi.   Poornima Hilton is a 62 year old female with a left ankle condition.  Condition occurred with:  A fall/slip.  Condition occurred: in the community.  This is a new condition  S/P ORIF L ankle 8/25/17 suffered fall while [place] 8[place]19/[place]. NWB until off pain meds. Was a trimalleolar fx.  Works for [employer], sometimes in the field. .        Pain is described as aching and is intermittent and reported as 3/10.  Associated symptoms:  Loss of strength, loss of motion/stiffness and numbness. Pain is worse in the P.M..  Symptoms are exacerbated by activity and relieved by rest.  Since onset symptoms are gradually improving.    Previous treatment includes surgery.                                                  Objective:      Gait:  Hoping with walker right now  Weight Bearing Status:  NWB               Ankle/Foot Evaluation  ROM:    AROM:    Dorsiflexion: Left:   0  Right:    Plantarflexion: Left:  20    Right:   Inversion: Left:  15     Right:   Eversion: 5     Right:         Strength wnl ankle: deffered at ankle; proximal hip strength grossly 4/5.        EDEMA: Edema ankle: swelling ball of foot;                                                               General     ROS    Assessment/Plan:      Patient is a 62 year old female with left side ankle complaints.    Patient has the following significant findings with corresponding treatment plan.                Diagnosis 1:  S/P L ORIF 8/25/17   Decreased ROM/flexibility - manual therapy and therapeutic exercise  Decreased strength - therapeutic exercise and therapeutic activities  Impaired muscle performance - neuro re-education  Decreased function - therapeutic activities    Therapy Evaluation Codes:   1) History comprised of:   Personal factors that impact the plan of care:      None.    Comorbidity factors that impact the plan of care are:      Weakness.     Medications  impacting care: None.  2) Examination of Body Systems comprised of:   Body structures and functions that impact the plan of care:      Ankle.   Activity limitations that impact the plan of care are:      Walking.  3) Clinical presentation characteristics are:   Stable/Uncomplicated.  4) Decision-Making    Low complexity using standardized patient assessment instrument and/or measureable assessment of functional outcome.  Cumulative Therapy Evaluation is: Low complexity.    Previous and current functional limitations:  (See Goal Flow Sheet for this information)    Short term and Long term goals: (See Goal Flow Sheet for this information)     Communication ability:  Patient appears to be able to clearly communicate and understand verbal and written communication and follow directions correctly.  Treatment Explanation - The following has been discussed with the patient:   RX ordered/plan of care  Anticipated outcomes  Possible risks and side effects  This patient would benefit from PT intervention to resume normal activities.   Rehab potential is good.    Frequency:  1 X week, once daily  Duration:  for 6-8 weeks  Discharge Plan:  Achieve all LTG.  Independent in home treatment program.  Reach maximal therapeutic benefit.    Please refer to the daily flowsheet for treatment today, total treatment time and time spent performing 1:1 timed codes.

## 2017-09-15 ENCOUNTER — THERAPY VISIT (OUTPATIENT)
Dept: PHYSICAL THERAPY | Facility: CLINIC | Age: 62
End: 2017-09-15
Payer: COMMERCIAL

## 2017-09-15 DIAGNOSIS — S82.892A ANKLE FRACTURE, LEFT, CLOSED, INITIAL ENCOUNTER: ICD-10-CM

## 2017-09-15 DIAGNOSIS — Z98.890 S/P ORIF (OPEN REDUCTION INTERNAL FIXATION) FRACTURE: ICD-10-CM

## 2017-09-15 DIAGNOSIS — Z87.81 S/P ORIF (OPEN REDUCTION INTERNAL FIXATION) FRACTURE: ICD-10-CM

## 2017-09-15 PROCEDURE — 97110 THERAPEUTIC EXERCISES: CPT | Mod: GP

## 2017-09-15 PROCEDURE — 97530 THERAPEUTIC ACTIVITIES: CPT | Mod: GP

## 2017-09-22 ENCOUNTER — THERAPY VISIT (OUTPATIENT)
Dept: PHYSICAL THERAPY | Facility: CLINIC | Age: 62
End: 2017-09-22
Payer: COMMERCIAL

## 2017-09-22 ENCOUNTER — OFFICE VISIT (OUTPATIENT)
Dept: INTERNAL MEDICINE | Facility: CLINIC | Age: 62
End: 2017-09-22

## 2017-09-22 VITALS
OXYGEN SATURATION: 98 % | HEART RATE: 99 BPM | SYSTOLIC BLOOD PRESSURE: 111 MMHG | DIASTOLIC BLOOD PRESSURE: 76 MMHG | RESPIRATION RATE: 16 BRPM

## 2017-09-22 DIAGNOSIS — R60.0 LOCALIZED EDEMA: Primary | ICD-10-CM

## 2017-09-22 DIAGNOSIS — F32.A DEPRESSION, UNSPECIFIED DEPRESSION TYPE: ICD-10-CM

## 2017-09-22 DIAGNOSIS — F41.9 ANXIETY: Primary | ICD-10-CM

## 2017-09-22 DIAGNOSIS — E78.5 HYPERLIPIDEMIA, UNSPECIFIED HYPERLIPIDEMIA TYPE: ICD-10-CM

## 2017-09-22 DIAGNOSIS — Z23 NEED FOR PROPHYLACTIC VACCINATION AND INOCULATION AGAINST INFLUENZA: ICD-10-CM

## 2017-09-22 DIAGNOSIS — Z87.81 S/P ORIF (OPEN REDUCTION INTERNAL FIXATION) FRACTURE: ICD-10-CM

## 2017-09-22 DIAGNOSIS — Z98.890 S/P ORIF (OPEN REDUCTION INTERNAL FIXATION) FRACTURE: ICD-10-CM

## 2017-09-22 DIAGNOSIS — S82.892A ANKLE FRACTURE, LEFT, CLOSED, INITIAL ENCOUNTER: ICD-10-CM

## 2017-09-22 DIAGNOSIS — Z23 NEED FOR PROPHYLACTIC VACCINATION WITH TETANUS-DIPHTHERIA (TD): ICD-10-CM

## 2017-09-22 LAB
ANION GAP SERPL CALCULATED.3IONS-SCNC: 7 MMOL/L (ref 3–14)
BUN SERPL-MCNC: 15 MG/DL (ref 7–30)
CALCIUM SERPL-MCNC: 9.3 MG/DL (ref 8.5–10.1)
CHLORIDE SERPL-SCNC: 103 MMOL/L (ref 94–109)
CHOLEST SERPL-MCNC: 196 MG/DL
CO2 SERPL-SCNC: 29 MMOL/L (ref 20–32)
CREAT SERPL-MCNC: 0.84 MG/DL (ref 0.52–1.04)
GFR SERPL CREATININE-BSD FRML MDRD: 68 ML/MIN/1.7M2
GLUCOSE SERPL-MCNC: 88 MG/DL (ref 70–99)
HDLC SERPL-MCNC: 82 MG/DL
LDLC SERPL CALC-MCNC: 99 MG/DL
NONHDLC SERPL-MCNC: 115 MG/DL
POTASSIUM SERPL-SCNC: 3.7 MMOL/L (ref 3.4–5.3)
SODIUM SERPL-SCNC: 139 MMOL/L (ref 133–144)
TRIGL SERPL-MCNC: 80 MG/DL

## 2017-09-22 PROCEDURE — 97110 THERAPEUTIC EXERCISES: CPT | Mod: GP

## 2017-09-22 PROCEDURE — 97016 VASOPNEUMATIC DEVICE THERAPY: CPT | Mod: GP

## 2017-09-22 PROCEDURE — 97530 THERAPEUTIC ACTIVITIES: CPT | Mod: GP

## 2017-09-22 RX ORDER — CLONAZEPAM 0.5 MG/1
0.5 TABLET ORAL 2 TIMES DAILY PRN
Qty: 12 TABLET | Refills: 0 | Status: SHIPPED | OUTPATIENT
Start: 2017-09-22 | End: 2019-04-02

## 2017-09-22 RX ORDER — BUPROPION HYDROCHLORIDE 100 MG/1
100 TABLET, EXTENDED RELEASE ORAL 2 TIMES DAILY
Qty: 200 TABLET | Refills: 3 | Status: SHIPPED | OUTPATIENT
Start: 2017-09-22 | End: 2018-10-01

## 2017-09-22 ASSESSMENT — PAIN SCALES - GENERAL: PAINLEVEL: NO PAIN (0)

## 2017-09-22 NOTE — NURSING NOTE
Chief Complaint   Patient presents with     Physical     Patient is here for annual physical.     Medication Refill     Patient is here for medication refill.      Lashae Turk LPN at 12:01 PM on 9/22/2017.

## 2017-09-22 NOTE — PATIENT INSTRUCTIONS
[facility]: [phone]     [facility] Medication Refill Request Information:  * Please contact your pharmacy regarding ANY request for medication refills.  ** [facility] Prescription Fax = [phone]  * Please allow 3 business days for routine medication refills.  * Please allow 5 business days for controlled substance medication refills.     [facility] Test Result notification information:  *You will be notified with in 7-10 days of your appointment day regarding the results of your test.  If you are on MyChart you will be notified as soon as the provider has reviewed the results and signed off on them.

## 2017-09-22 NOTE — NURSING NOTE
Injectable Influenza Immunization Documentation      1.  Has the patient received the information for the injectable influenza vaccine? YES    2. Is the patient 6 months of age or older? YES    3. Does the patient have any of the following contraindications?          Severe allergy to eggs? No     Severe allergic reaction to previous influenza vaccines? No     Allergy to contact lens solution/thimerosol? No     History of Guillain-Upper Tract syndrome? No     Undergoing chemotherapy or radiation therapy?       (vaccine should be given at least 2 weeks prior or 3 weeks after)  No     Currently have moderate or severe illness? No         4.  The vaccine has been administered and the patient was instructed to wait 15 minutes before leaving the building in the event of an allergic reaction: YES    Lashae Turk LPN at 12:02 PM on 9/22/2017.

## 2017-09-22 NOTE — PROGRESS NOTES
HPI  62-year-old presents today for physical examination and update on her depression and Wellbutrin. She's been tolerating the Wellbutrin well she's been functioning well she's had done no depressive symptoms and this is not interfering with her daily function. 3 weeks ago she suffered a displaced trimalleolar fracture on a camping trip. She is now status post surgical repair and in physical therapy.  She's had ongoing problems with swelling and edema in the ankle which is limited her physical activity and exercise and caused her some anxiety and frustration at times. She's had no associated increase in pain or discomfort and is currently not using any analgesia for this.  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     Mild recurrent major depression (H) 10/1/2012     Osteoarthritis of carpometacarpal joint of thumb     bilaterally     Primary open angle glaucoma of both eyes, mild stage 12/01/2016     Past Surgical History:   Procedure Laterality Date     ORIF left ankle  08/20/2017     Family History   Problem Relation Age of Onset     CEREBROVASCULAR DISEASE Mother      Depression Mother      Eye Disorder Mother      macular degeneration     Psychotic Disorder Mother      Respiratory Mother      Glaucoma Mother      Cancer - colorectal Father      Arthritis Father      Cardiovascular Father      HEART DISEASE Father      Thyroid Disease Father      Sleep Apnea Father      Eye Disorder Maternal Grandfather      Glaucoma Maternal Grandfather      Macular Degeneration Maternal Grandfather      CEREBROVASCULAR DISEASE Paternal Grandmother      Arthritis Paternal Grandmother      Unknown/Adopted Paternal Grandfather      Social History     Social History     Marital status:  Unknown     Spouse name: N/A     Number of children: N/A     Years of education: N/A     Social History Main Topics     Smoking status: Never Smoker     Smokeless tobacco: Never Used     Alcohol use 3.0 oz/week     6 Standard drinks or equivalent per week      Comment: 3 per week     Drug use: No     Sexual activity: Yes     Partners: Male     Other Topics Concern     Exercise Yes     run/walk 2-3 x/week     Social History Narrative     Complete review of symptoms negative except as noted above.    Exam:  /76  Pulse 99  Resp 16  SpO2 98%  Breastfeeding? No  PHYSICAL EXAMINATION:   The patient is alert, oriented with a clear sensorium.   Skin shows no lesions or rashes and good turgor.   Head is normocephalic and atraumatic.   Eyes show PERRLA. Fundi are benign.   Ears show normal TMs bilaterally.   Mouth shows clear oral mucosa.   Neck shows no nodes, thyromegaly or bruits.   Back is nontender.   Lungs are clear to percussion and auscultation.   Heart shows normal S1 and S2 without murmur or gallop.   Abdomen is soft, nontender without masses or organomegaly.   Extremities show no edema and no evidence of active synovitis.   Neurologic examination shows cranial nerves II-XII intact. Motor shows normal strength. Reflexes are full and symmetrical.     ASSESSMENT  1 depression in remission  2 trimalleolar fracture healing  3 mild anxiety secondary to above  4 hyperlipidemia needs reevaluation    Plan  Pulmonary follow-up for fasting lipids and BMP will update her immunizations with a flu shot and a TD Today and encouraged her to increase her activity and exercise as tolerated and will have her reassessed in a year and sooner immediately if increased symptoms or problems      Maxim Marcum MD  General Internal Medicine  Primary [facility] Center  [phone]

## 2017-09-22 NOTE — MR AVS SNAPSHOT
After Visit Summary   9/22/2017    Poornima Hilton    MRN: [id number]           Patient Information     Date Of Birth          1955        Visit Information        Provider Department      9/22/2017 12:40 PM Maxim Marcum MD [facility]        Today's Diagnoses     Anxiety    -  1    Need for prophylactic vaccination and inoculation against influenza        Need for prophylactic vaccination with tetanus-diphtheria (TD)        Depression, unspecified depression type        Hyperlipidemia, unspecified hyperlipidemia type          Care Instructions    [facility]: [phone]     [facility] Medication Refill Request Information:  * Please contact your pharmacy regarding ANY request for medication refills.  ** [facility] Prescription Fax = [phone]  * Please allow 3 business days for routine medication refills.  * Please allow 5 business days for controlled substance medication refills.     [facility] Test Result notification information:  *You will be notified with in 7-10 days of your appointment day regarding the results of your test.  If you are on MyChart you will be notified as soon as the provider has reviewed the results and signed off on them.            Follow-ups after your visit        Your next 10 appointments already scheduled     Sep 22, 2017  1:30 PM CDT   CARLO Extremity with Tavo Pennington PT   [facility] Physical [facility] CARLO ([facility])    [address] [place] [id number] [phone]            Sep 29, 2017  8:20 AM CDT   CARLO Extremity with Sharon Billings PT   [facility] CARLO ([facility])    [address] [place] [id number] [phone]            Oct 06, 2017  8:20 AM CDT   CARLO Extremity with Tavo Pennington PT   [facility] CARLO ([facility])    [address] [place] [address]  [address] [id number] [phone]            Oct 06, 2017  9:30 AM CDT   LAB with  LAB    Health Lab ([facility])    [address] [place] [id number] [phone]           Patient must bring picture ID. Patient should be prepared to give a urine specimen  Please do not eat 10-12 hours before your appointment if you are coming in fasting for labs on lipids, cholesterol, or glucose (sugar). Pregnant women should follow their Care Team instructions. Water with medications is okay. Do not drink coffee or other fluids. If you have concerns about taking  your medications, please ask at office or if scheduling via [employer], send a message by clicking on Secure Messaging, Message Your Care Team.            Oct 10, 2017  9:10 AM CDT   (Arrive by 8:55 AM)   POST-OP FOOT/ANKLE with Scooter Watson MD   [facility] ([facility])    [address] [place] [id number] [phone]            Oct 13, 2017  8:20 AM CDT   CARLO Extremity with Tavo Pennington PT   [facility] CARLO ([facility])    [address] [place] [id number] [phone]            Oct 20, 2017  8:20 AM CDT   CARLO Extremity with Sharon Billings PT   [facility] CARLO ([facility])    [address] [place] [id number] [phone]            Nov 02, 2017  9:15 AM CDT   VISUAL FIELD with [facility] EYE VISUAL FIELD   Eye Clinic ([facility])    [address]  [address]  [address]  [facility] [address]   [phone]            Nov 02, 2017 10:00 AM CDT   RETURN GLAUCOMA with Felicia Abreu MD   Eye Clinic ([facility])    [address]  [address]  [address] Clin [address]  [facility] [address]   [phone]              Future  tests that were ordered for you today     Open Future Orders        Priority Expected Expires Ordered    Basic metabolic panel Routine  9/22/2018 9/22/2017    Lipid Profile Routine  9/22/2018 9/22/2017            Who to contact     Please call your clinic at [phone] to:    Ask questions about your health    Make or cancel appointments    Discuss your medicines    Learn about your test results    Speak to your doctor   If you have compliments or concerns about an experience at your clinic, or if you wish to file a complaint, please contact [facility] Patient Relations at [phone] or email us at [email or web address]         Additional Information About Your Visit        [employer] Information     [employer] gives you secure access to your electronic health record. If you see a primary care provider, you can also send messages to your care team and make appointments. If you have questions, please call your primary care clinic.  If you do not have a primary care provider, please call [phone] and they will assist you.      [employer] is an electronic gateway that provides easy, online access to your medical records. With [employer], you can request a clinic appointment, read your test results, renew a prescription or communicate with your care team.     To access your existing account, please contact your [facility] Clinic or call [phone] for assistance.        Care EveryWhere ID     This is your Care EveryWhere ID. This could be used by other organizations to access your [place] medical records  [id number]        Your Vitals Were     Pulse Respirations Pulse Oximetry Breastfeeding?          99 16 98% No         Blood Pressure from Last 3 Encounters:   09/22/17 111/76   08/22/17 124/78   07/20/16 122/80    Weight from Last 3 Encounters:   08/22/17 68 kg (150 lb)   08/22/17 68 kg (150 lb)   07/20/16 67.1 kg (148 lb)              We Performed the  Following     FLU VACCINE, 3 YRS +, IM  [82962]     TDAP ( BOOSTRIX AGES 10-64)          Today's Medication Changes          These changes are accurate as of: 9/22/17  1:08 PM.  If you have any questions, ask your nurse or doctor.               Start taking these medicines.        Dose/Directions    clonazePAM 0.5 MG tablet   Commonly known as:  klonoPIN   Used for:  Anxiety   Started by:  Maxim Marcum MD        Dose:  0.5 mg   Take 1 tablet (0.5 mg) by mouth 2 times daily as needed for anxiety   Quantity:  12 tablet   Refills:  0         These medicines have changed or have updated prescriptions.        Dose/Directions    buPROPion 100 MG 12 hr tablet   Commonly known as:  WELLBUTRIN SR   This may have changed:  additional instructions   Used for:  Depression, unspecified depression type   Changed by:  Maxim Marcum MD        Dose:  100 mg   Take 1 tablet (100 mg) by mouth 2 times daily   Quantity:  200 tablet   Refills:  3       glucosamine chondroitin 500 complex Caps   This may have changed:  when to take this   Used for:  Osteoarthritides        Dose:  1500 mg   Take 1,500 mg by mouth daily.   Quantity:  200 capsule   Refills:  99         Stop taking these medicines if you haven't already. Please contact your care team if you have questions.     PERCOCET 5-325 MG per tablet   Generic drug:  oxyCODONE-acetaminophen   Stopped by:  Maxim Marcum MD                Where to get your medicines      These medications were sent to [id number] - [place], [place] - [address] [facility] [address]  [facility] [address], [place] [address]     Phone:  [phone]     buPROPion 100 MG 12 hr tablet         Some of these will need a paper prescription and others can be bought over the counter.  Ask your nurse if you have questions.     Bring a paper prescription for each of these medications     clonazePAM 0.5 MG tablet                Primary  Care Provider Office Phone # Fax #    Maxim Marcum [phone]       [address] [place] [address]        Equal Access to Services     ERASMO GUERRIER : Hadii aad ku hadmilagroenid Yasir, waaxda luqadaha, qaybta kaalmada alexandre, rehan clarke santy kumar. So [facility] [phone].    ATENCIÓN: Si habla español, tiene a figueredo disposición servicios gratuitos de asistencia lingüística. Llame al [phone].    We comply with applicable federal civil rights laws and [place] laws. We do not discriminate on the basis of race, color, national origin, age, disability sex, sexual orientation or gender identity.            Thank you!     Thank you for choosing [facility]  for your care. Our goal is always to provide you with excellent care. Hearing back from our patients is one way we can continue to improve our services. Please take a few minutes to complete the written survey that you may receive in the mail after your visit with us. Thank you!             Your Updated Medication List - Protect others around you: Learn how to safely use, store and throw away your medicines at www.disposemymeds.org.          This list is accurate as of: 9/22/17  1:08 PM.  Always use your most recent med list.                   Brand Name Dispense Instructions for use Diagnosis    aspirin 81 MG tablet      Take 1 tablet by mouth every morning    Routine general medical examination at a health care facility       buPROPion 100 MG 12 hr tablet    WELLBUTRIN SR    200 tablet    Take 1 tablet (100 mg) by mouth 2 times daily    Depression, unspecified depression type       calcium 600 MG tablet      Take 1 tablet by mouth 2 times daily.    Routine general medical examination at a health care facility       clonazePAM 0.5 MG tablet    klonoPIN    12 tablet    Take 1 tablet (0.5 mg) by mouth 2 times daily as needed for anxiety    Anxiety       glucosamine chondroitin 500 complex  "Caps     200 capsule    Take 1,500 mg by mouth daily.    Osteoarthritides       HYDROcodone-acetaminophen 5-325 MG per tablet    NORCO    30 tablet    Take 1 tablet by mouth every 6 hours as needed for moderate to severe pain    Ankle fracture       latanoprost 0.005 % ophthalmic solution    XALATAN    7.5 mL    Place 1 drop into both eyes At Bedtime    Normal tension glaucoma of both eyes, mild stage       MULTIVITAMINS PO      Take by mouth every morning        * order for DME     1 Units    Wheelchair with leg rests   Weight: 150 lbs  Height: 5'5\"    Closed left ankle fracture, initial encounter       * order for DME     1 Units    Roll-A-Bout Walker. Patient can use for 2 months    Closed left ankle fracture, initial encounter       TYLENOL 325 MG tablet   Generic drug:  acetaminophen      Take 1-2 tablets by mouth as needed.        * Notice:  This list has 2 medication(s) that are the same as other medications prescribed for you. Read the directions carefully, and ask your doctor or other care provider to review them with you.      "

## 2017-09-22 NOTE — NURSING NOTE
Patient received TDAP vaccine.  See immunization list for administration details.  Patient tolerated injection well.     Lashae Mccarty at 1:21 PM on 9/22/2017.

## 2017-09-28 ENCOUNTER — THERAPY VISIT (OUTPATIENT)
Dept: PHYSICAL THERAPY | Facility: CLINIC | Age: 62
End: 2017-09-28
Payer: COMMERCIAL

## 2017-09-28 DIAGNOSIS — S82.892A ANKLE FRACTURE, LEFT, CLOSED, INITIAL ENCOUNTER: ICD-10-CM

## 2017-09-28 DIAGNOSIS — Z98.890 S/P ORIF (OPEN REDUCTION INTERNAL FIXATION) FRACTURE: ICD-10-CM

## 2017-09-28 DIAGNOSIS — R60.0 LOCALIZED EDEMA: ICD-10-CM

## 2017-09-28 DIAGNOSIS — Z87.81 S/P ORIF (OPEN REDUCTION INTERNAL FIXATION) FRACTURE: ICD-10-CM

## 2017-09-28 PROCEDURE — 97140 MANUAL THERAPY 1/> REGIONS: CPT | Mod: GP | Performed by: PHYSICAL THERAPIST

## 2017-09-28 PROCEDURE — 97110 THERAPEUTIC EXERCISES: CPT | Mod: GP | Performed by: PHYSICAL THERAPIST

## 2017-09-28 PROCEDURE — 97016 VASOPNEUMATIC DEVICE THERAPY: CPT | Mod: GP | Performed by: PHYSICAL THERAPIST

## 2017-10-03 DIAGNOSIS — Z09 SURGICAL FOLLOW-UP CARE: Primary | ICD-10-CM

## 2017-10-06 ENCOUNTER — THERAPY VISIT (OUTPATIENT)
Dept: PHYSICAL THERAPY | Facility: CLINIC | Age: 62
End: 2017-10-06
Payer: COMMERCIAL

## 2017-10-06 DIAGNOSIS — Z87.81 S/P ORIF (OPEN REDUCTION INTERNAL FIXATION) FRACTURE: ICD-10-CM

## 2017-10-06 DIAGNOSIS — Z98.890 S/P ORIF (OPEN REDUCTION INTERNAL FIXATION) FRACTURE: ICD-10-CM

## 2017-10-06 DIAGNOSIS — R60.0 LOCALIZED EDEMA: ICD-10-CM

## 2017-10-06 DIAGNOSIS — S82.892A CLOSED FRACTURE OF LEFT ANKLE, INITIAL ENCOUNTER: ICD-10-CM

## 2017-10-06 PROCEDURE — 97110 THERAPEUTIC EXERCISES: CPT | Mod: GP

## 2017-10-06 PROCEDURE — 97140 MANUAL THERAPY 1/> REGIONS: CPT | Mod: GP

## 2017-10-10 ENCOUNTER — OFFICE VISIT (OUTPATIENT)
Dept: ORTHOPEDICS | Facility: CLINIC | Age: 62
End: 2017-10-10

## 2017-10-10 DIAGNOSIS — M25.572 PAIN IN JOINT INVOLVING ANKLE AND FOOT, LEFT: Primary | ICD-10-CM

## 2017-10-10 DIAGNOSIS — M25.572 PAIN IN JOINT, ANKLE AND FOOT, LEFT: ICD-10-CM

## 2017-10-10 NOTE — NURSING NOTE
Reason For Visit:   Chief Complaint   Patient presents with     RECHECK     Left ankle ORIF. DOS 8/25/17.           Pain Assessment  Patient Currently in Pain: No

## 2017-10-10 NOTE — LETTER
10/10/2017       RE: Poornima Hilton  [address] [place] [address]     Dear Colleague,    Thank you for referring your patient, Poornima Hilton, to the [facility] at [facility]. Please see a copy of my visit note below.    CHIEF COMPLAINT:  Status post left ankle open reduction and internal fixation performed on 08/25/2017.      HISTORY OF PRESENT ILLNESS:  Ms. Hilton presents today for further followup.  Reports to have no problems or complaints.  The patient has been on crutches for the first 6 weeks given some misunderstanding.      PHYSICAL EXAMINATION:  On today's visit, she presents with a combined plantar and dorsiflexion of approximately 30 degrees.  Presents with well-healed surgical incisions.  There is some swelling across the ankle joint.      RADIOGRAPHIC EVALUATION:  Three views of the left ankle were obtained today which were significant for showing what seems to be a partial consolidation of the fracture sites.  Hardware is intact and in place.  Alignment is excellent.  Ankle syndesmosis alignment is also excellent.      ASSESSMENT:  Status post left ankle open reduction and internal fixation.      PLAN:  I discussed with the patient that at this point we can progressively wean her off the crutches as well as the CAM walker.  This will happen over the next month.  At that time, she will return to clinic and 3 views of the left ankle will be obtained.      The patient was encouraged not to push through the pain and to follow her symptoms as a guide to how much to do.      All questions were answered.  The patient was pleased with the discussion.           Again, thank you for allowing me to participate in the care of your patient.      Sincerely,    Scooter Watson MD

## 2017-10-10 NOTE — MR AVS SNAPSHOT
After Visit Summary   10/10/2017    Poornima Hilton    MRN: [id number]           Patient Information     Date Of Birth          1955        Visit Information        Provider Department      10/10/2017 9:10 AM Scooter Watson MD [facility]        Today's Diagnoses     Pain in joint involving ankle and foot, left    -  1    Pain in joint, ankle and foot, left           Follow-ups after your visit        Additional Services     PHYSICAL THERAPY REFERRAL (Internal)       Physical Therapy Referral                  Your next 10 appointments already scheduled     Oct 20, 2017  8:20 AM CDT   CARLO Extremity with Sharon Billings PT   [facility] CARLO ([facility])    [address] [place] [address] [address]th [facility] [id number] [phone]            Oct 27, 2017  7:40 AM CDT   CARLO Extremity with Tavo Pennington PT   [facility] ([facility] [place])    [address] [place] [address] 5th [facility] [id number] [phone]            Nov 02, 2017  9:15 AM CDT   VISUAL FIELD with [facility] EYE VISUAL FIELD   Eye Clinic ([facility])    [address]  [address]  [address] Clin [address]a  [facility] [address]   [phone]            Nov 02, 2017 10:00 AM CDT   RETURN GLAUCOMA with Felicia Abreu MD   Eye Clinic ([facility])    [address]  [address]  [address] Clin 9a  [facility] [address]   [phone]            Nov 03, 2017  8:20 AM CDT   CARLO Extremity with Tavo Pennington PT   [facility] ([facility])    [address] [place] [id number] [phone]            Nov 07, 2017  8:30 AM CST   (Arrive by 8:15 AM)   RETURN FOOT/ANKLE with Scooter Watson MD   [facility] ([facility])    [address] [place] [address]    [phone]            Nov 10, 2017  8:20 AM CST   CARLO Extremity with Tavo Pennington PT   [facility] ([facility])    [address] [address] [phone]            Nov 17, 2017  8:20 AM CST   CARLO Extremity with Tavo Pennington PT   [facility] ([facility])    [address] [place] [address] [phone]              Who to contact     Please call your clinic at [phone] to:    Ask questions about your health    Make or cancel appointments    Discuss your medicines    Learn about your test results    Speak to your doctor   If you have compliments or concerns about an experience at your clinic, or if you wish to file a complaint, please contact [facility] Patient Relations at [phone] or email us at [email or web address]         Additional Information About Your Visit        GazzangharMarcandi Information     [employer] gives you secure access to your electronic health record. If you see a primary care provider, you can also send messages to your care team and make appointments. If you have questions, please call your primary care clinic.  If you do not have a primary care provider, please call [phone] and they will assist you.      [employer] is an electronic gateway that provides easy, online access to your medical records. With [employer], you can request a clinic appointment, read your test results, renew a prescription or communicate with your care team.     To access your existing account, please contact your [facility] or call [phone] for assistance.        Care EveryWhere ID     This is your Care EveryWhere ID. This could be used by other organizations to access your [place] medical records  [id number]         Blood Pressure from Last 3 Encounters:   09/22/17 111/76   08/22/17 124/78    07/20/16 122/80    Weight from Last 3 Encounters:   08/22/17 68 kg (150 lb)   08/22/17 68 kg (150 lb)   07/20/16 67.1 kg (148 lb)              We Performed the Following     PHYSICAL THERAPY REFERRAL (Internal)          Today's Medication Changes          These changes are accurate as of: 10/10/17 11:59 PM.  If you have any questions, ask your nurse or doctor.               These medicines have changed or have updated prescriptions.        Dose/Directions    glucosamine chondroitin 500 complex Caps   This may have changed:  when to take this   Used for:  Osteoarthritides        Dose:  1500 mg   Take 1,500 mg by mouth daily.   Quantity:  200 capsule   Refills:  99         Stop taking these medicines if you haven't already. Please contact your care team if you have questions.     HYDROcodone-acetaminophen 5-325 MG per tablet   Commonly known as:  NORCO   Stopped by:  Scooter Watson MD           order for DME   Stopped by:  Scooter Watson MD           TYLENOL 325 MG tablet   Generic drug:  acetaminophen   Stopped by:  Scooter Watson MD                    Primary Care Provider Office Phone # Fax #    Maxmi Anselmo Marcum [phone]       [address] [place] [id number]        Equal Access to Services     GRACE GUERRIER : Irena church Sojoe, waaxda apolinar, qaybta kaalmada alexandre, rehan kumar. So [facility] [phone].    ATENCIÓN: Si habla español, tiene a figueredo disposición servicios gratuitos de asistencia lingüística. Llame al [phone].    We comply with applicable federal civil rights laws and [place] laws. We do not discriminate on the basis of race, color, national origin, age, disability, sex, sexual orientation, or gender identity.            Thank you!     Thank you for choosing [facility]  for your care. Our goal is always to provide you with excellent care. Hearing back from our patients  is one way we can continue to improve our services. Please take a few minutes to complete the written survey that you may receive in the mail after your visit with us. Thank you!             Your Updated Medication List - Protect others around you: Learn how to safely use, store and throw away your medicines at www.disposemymeds.org.          This list is accurate as of: 10/10/17 11:59 PM.  Always use your most recent med list.                   Brand Name Dispense Instructions for use Diagnosis    aspirin 81 MG tablet      Take 1 tablet by mouth every morning    Routine general medical examination at a health care facility       buPROPion 100 MG 12 hr tablet    WELLBUTRIN SR    200 tablet    Take 1 tablet (100 mg) by mouth 2 times daily    Depression, unspecified depression type       calcium 600 MG tablet      Take 1 tablet by mouth 2 times daily.    Routine general medical examination at a health care facility       clonazePAM 0.5 MG tablet    klonoPIN    12 tablet    Take 1 tablet (0.5 mg) by mouth 2 times daily as needed for anxiety    Anxiety       glucosamine chondroitin 500 complex Caps     200 capsule    Take 1,500 mg by mouth daily.    Osteoarthritides       latanoprost 0.005 % ophthalmic solution    XALATAN    7.5 mL    Place 1 drop into both eyes At Bedtime    Normal tension glaucoma of both eyes, mild stage       MULTIVITAMINS PO      Take by mouth every morning

## 2017-10-10 NOTE — PROGRESS NOTES
CHIEF COMPLAINT:  Status post left ankle open reduction and internal fixation performed on 08/25/2017.      HISTORY OF PRESENT ILLNESS:  Ms. Hilton presents today for further followup.  Reports to have no problems or complaints.  The patient has been on crutches for the first 6 weeks given some misunderstanding.      PHYSICAL EXAMINATION:  On today's visit, she presents with a combined plantar and dorsiflexion of approximately 30 degrees.  Presents with well-healed surgical incisions.  There is some swelling across the ankle joint.      RADIOGRAPHIC EVALUATION:  Three views of the left ankle were obtained today which were significant for showing what seems to be a partial consolidation of the fracture sites.  Hardware is intact and in place.  Alignment is excellent.  Ankle syndesmosis alignment is also excellent.      ASSESSMENT:  Status post left ankle open reduction and internal fixation.      PLAN:  I discussed with the patient that at this point we can progressively wean her off the crutches as well as the CAM walker.  This will happen over the next month.  At that time, she will return to clinic and 3 views of the left ankle will be obtained.      The patient was encouraged not to push through the pain and to follow her symptoms as a guide to how much to do.      All questions were answered.  The patient was pleased with the discussion.

## 2017-10-13 ENCOUNTER — THERAPY VISIT (OUTPATIENT)
Dept: PHYSICAL THERAPY | Facility: CLINIC | Age: 62
End: 2017-10-13
Payer: COMMERCIAL

## 2017-10-13 DIAGNOSIS — S82.892A CLOSED FRACTURE OF LEFT ANKLE, INITIAL ENCOUNTER: ICD-10-CM

## 2017-10-13 DIAGNOSIS — Z98.890 S/P ORIF (OPEN REDUCTION INTERNAL FIXATION) FRACTURE: ICD-10-CM

## 2017-10-13 DIAGNOSIS — Z87.81 S/P ORIF (OPEN REDUCTION INTERNAL FIXATION) FRACTURE: ICD-10-CM

## 2017-10-13 DIAGNOSIS — R60.0 LOCALIZED EDEMA: ICD-10-CM

## 2017-10-13 PROCEDURE — 97140 MANUAL THERAPY 1/> REGIONS: CPT | Mod: GP

## 2017-10-13 PROCEDURE — 97110 THERAPEUTIC EXERCISES: CPT | Mod: GP

## 2017-10-20 ENCOUNTER — THERAPY VISIT (OUTPATIENT)
Dept: PHYSICAL THERAPY | Facility: CLINIC | Age: 62
End: 2017-10-20
Payer: COMMERCIAL

## 2017-10-20 DIAGNOSIS — Z87.81 S/P ORIF (OPEN REDUCTION INTERNAL FIXATION) FRACTURE: ICD-10-CM

## 2017-10-20 DIAGNOSIS — R60.0 LOCALIZED EDEMA: ICD-10-CM

## 2017-10-20 DIAGNOSIS — S82.892A CLOSED FRACTURE OF LEFT ANKLE, INITIAL ENCOUNTER: ICD-10-CM

## 2017-10-20 DIAGNOSIS — Z98.890 S/P ORIF (OPEN REDUCTION INTERNAL FIXATION) FRACTURE: ICD-10-CM

## 2017-10-20 PROCEDURE — 97140 MANUAL THERAPY 1/> REGIONS: CPT | Mod: GP | Performed by: PHYSICAL THERAPIST

## 2017-10-20 PROCEDURE — 97110 THERAPEUTIC EXERCISES: CPT | Mod: GP | Performed by: PHYSICAL THERAPIST

## 2017-10-20 PROCEDURE — 97112 NEUROMUSCULAR REEDUCATION: CPT | Mod: GP | Performed by: PHYSICAL THERAPIST

## 2017-10-27 ENCOUNTER — THERAPY VISIT (OUTPATIENT)
Dept: PHYSICAL THERAPY | Facility: CLINIC | Age: 62
End: 2017-10-27
Payer: COMMERCIAL

## 2017-10-27 DIAGNOSIS — Z87.81 S/P ORIF (OPEN REDUCTION INTERNAL FIXATION) FRACTURE: ICD-10-CM

## 2017-10-27 DIAGNOSIS — Z98.890 S/P ORIF (OPEN REDUCTION INTERNAL FIXATION) FRACTURE: ICD-10-CM

## 2017-10-27 DIAGNOSIS — S82.892A CLOSED FRACTURE OF LEFT ANKLE, INITIAL ENCOUNTER: ICD-10-CM

## 2017-10-27 DIAGNOSIS — R60.0 LOCALIZED EDEMA: ICD-10-CM

## 2017-10-27 PROCEDURE — 97112 NEUROMUSCULAR REEDUCATION: CPT | Mod: GP

## 2017-10-27 PROCEDURE — 97140 MANUAL THERAPY 1/> REGIONS: CPT | Mod: GP

## 2017-10-27 PROCEDURE — 97110 THERAPEUTIC EXERCISES: CPT | Mod: GP

## 2017-11-01 DIAGNOSIS — H40.9 GLAUCOMA: Primary | ICD-10-CM

## 2017-11-02 ENCOUNTER — OFFICE VISIT (OUTPATIENT)
Dept: OPHTHALMOLOGY | Facility: CLINIC | Age: 62
End: 2017-11-02
Attending: OPHTHALMOLOGY
Payer: COMMERCIAL

## 2017-11-02 DIAGNOSIS — Z98.890 S/P ORIF (OPEN REDUCTION INTERNAL FIXATION) FRACTURE: Primary | ICD-10-CM

## 2017-11-02 DIAGNOSIS — Z87.81 S/P ORIF (OPEN REDUCTION INTERNAL FIXATION) FRACTURE: Primary | ICD-10-CM

## 2017-11-02 DIAGNOSIS — H40.9 GLAUCOMA: ICD-10-CM

## 2017-11-02 DIAGNOSIS — H40.1132 PRIMARY OPEN ANGLE GLAUCOMA OF BOTH EYES, MODERATE STAGE: Primary | ICD-10-CM

## 2017-11-02 PROCEDURE — 99213 OFFICE O/P EST LOW 20 MIN: CPT | Mod: ZF

## 2017-11-02 PROCEDURE — 92133 CPTRZD OPH DX IMG PST SGM ON: CPT | Mod: ZF | Performed by: OPHTHALMOLOGY

## 2017-11-02 PROCEDURE — 92083 EXTENDED VISUAL FIELD XM: CPT | Mod: ZF | Performed by: OPHTHALMOLOGY

## 2017-11-02 PROCEDURE — 92015 DETERMINE REFRACTIVE STATE: CPT | Mod: ZF

## 2017-11-02 ASSESSMENT — REFRACTION_WEARINGRX
OS_SPHERE: -0.75
OD_SPHERE: -1.25
OD_AXIS: 048
OS_AXIS: 150
SPECS_TYPE: PAL
OS_CYLINDER: +1.75
OS_ADD: +2.50
OD_ADD: +2.50
OD_CYLINDER: +1.25

## 2017-11-02 ASSESSMENT — REFRACTION_MANIFEST
OD_CYLINDER: +1.00
OS_SPHERE: -0.75
OD_ADD: +2.50
OS_CYLINDER: +1.75
OS_AXIS: 150
OD_AXIS: 050
OS_ADD: +2.50
OD_SPHERE: -1.25

## 2017-11-02 ASSESSMENT — TONOMETRY
IOP_METHOD: APPLANATION
OS_IOP_MMHG: 17
OD_IOP_MMHG: 17

## 2017-11-02 ASSESSMENT — VISUAL ACUITY
OS_CC: 20/20
OD_CC: 20/25
OD_CC: J1+
OD_CC+: +2
METHOD: SNELLEN - LINEAR
OS_CC: J1

## 2017-11-02 ASSESSMENT — SLIT LAMP EXAM - LIDS
COMMENTS: NORMAL
COMMENTS: NORMAL

## 2017-11-02 ASSESSMENT — CUP TO DISC RATIO
OS_RATIO: 0.8
OD_RATIO: 0.7

## 2017-11-02 ASSESSMENT — EXTERNAL EXAM - LEFT EYE: OS_EXAM: NORMAL

## 2017-11-02 ASSESSMENT — CONF VISUAL FIELD
OS_NORMAL: 1
OD_NORMAL: 1

## 2017-11-02 ASSESSMENT — EXTERNAL EXAM - RIGHT EYE: OD_EXAM: NORMAL

## 2017-11-02 NOTE — NURSING NOTE
Chief Complaints and History of Present Illnesses   Patient presents with     Follow Up For     s/p Primary open angle glaucoma of both eyes, mild stage (Primar...     HPI    Affected eye(s):  Both   Symptoms:     No blurred vision   Decreased vision   No floaters   No flashes   No Dryness   No itching      Duration:  11 months   Frequency:  Constant       Do you have eye pain now?:  No      Comments:  Pt stated decreased vision over the last 11 months distance OU.   Latanoprost 9:30 PM OU.  Shay Cooper  9:31 AM November 2, 2017

## 2017-11-02 NOTE — PROGRESS NOTES
1)POAG/LTG vs Glc Suspect -- H/O DH OS, ??H/O Raynaud's per old notes, H/O low BP, anemia in past -- K pachy: 583/589   Tmax:20/19     HVF: OD:Full c fluct and OS:Nasal step (new in 11/17)     CDR: 0.7/0.8    HRT/OCT:tr RNFL thinning (?prog OS in 2017)      FHX of Glc: Mother -- had surgery, lost vision     Gonio: open      Intolerant to:      Asthma/COPD: No  Steroid Use:No     Kidney Stones:  No   Sulfa Allergy: No     IOP targets:    Patient will continue on Latanoprost which is a teal top drop at bedtime in both eyes and start Simbrinza (Brinzolamide/Brimonidine) which is a white top drop 2x/day (12 hours apart) in the LEFT EYE ONLY.    Patient will return to clinic in 1-2 months with repeat IOP check.      Attending Physician Attestation:  Complete documentation of historical and exam elements from today's encounter can be found in the full encounter summary report (not reduplicated in this progress note). I personally obtained the chief complaint(s) and history of present illness.  I confirmed and edited as necessary the review of systems, past medical/surgical history, family history, social history, and examination findings as documented by others; and I examined the patient myself. I personally reviewed the relevant tests, images, and reports as documented above. I formulated and edited as necessary the assessment and plan and discussed the findings and management plan with the patient and family.  - Felicia Abreu MD

## 2017-11-02 NOTE — MR AVS SNAPSHOT
After Visit Summary   11/2/2017    Poornima Hilton    MRN: [id number]           Patient Information     Date Of Birth          1955        Visit Information        Provider Department      11/2/2017 10:00 AM Felicia Abreu MD [facility]        Today's Diagnoses     Primary open angle glaucoma of both eyes, moderate stage    -  1    Glaucoma          Care Instructions    Patient will continue on Latanoprost which is a teal top drop at bedtime in both eyes and start Simbrinza (Brinzolamide/Brimonidine) which is a white top drop 2x/day (12 hours apart) in the LEFT EYE ONLY.    Patient will return to clinic in 1-2 months with repeat IOP check.            Follow-ups after your visit        Follow-up notes from your care team     Return 2 months with repeat IOP check..      Your next 10 appointments already scheduled     Nov 03, 2017  8:20 AM CDT   CARLO Extremity with Tavo Pennington PT   [facility] CARLO ([facility])    [address] [place] [address] [phone]            Nov 07, 2017  8:20 AM CST   XR ANKLE LEFT G/E 3 VIEWS with [id number]   [facility] XRay ([facility])    [address] [place] [address] [phone]           Please bring a list of your current medicines to your exam. (Include vitamins, minerals and over-thecounter medicines.) Leave your valuables at home.  Tell your doctor if there is a chance you may be pregnant.  You do not need to do anything special for this exam.            Nov 07, 2017  8:30 AM CST   (Arrive by 8:15 AM)   RETURN FOOT/ANKLE with Scooter Watson MD   [facility] ([facility])    [address] [place] [address] [phone]            Nov 10, 2017  8:20 AM CST   CARLO Extremity with Tavo Pennington PT   [facility] CARLO ([facility])    [address] [place] [address] [phone]            Nov 17, 2017  8:20 AM CST   CARLO Extremity with Tavo Pennington PT    [facility] ([facility])    [address] [place] [address] [phone]              Future tests that were ordered for you today     Open Future Orders        Priority Expected Expires Ordered    XR Ankle LT G/E 3 vw Routine 11/2/2017 12/2/2017 11/2/2017    DILATED FUNDUS EXAM Routine  11/1/2018 11/1/2017            Who to contact     Please call your clinic at [phone] to:    Ask questions about your health    Make or cancel appointments    Discuss your medicines    Learn about your test results    Speak to your doctor   If you have compliments or concerns about an experience at your clinic, or if you wish to file a complaint, please contact [facility] Patient Relations at [phone] or email us at [email or web address]         Additional Information About Your Visit        Helloworldhart Information     [employer] gives you secure access to your electronic health record. If you see a primary care provider, you can also send messages to your care team and make appointments. If you have questions, please call your primary care clinic.  If you do not have a primary care provider, please call [phone] and they will assist you.      [employer] is an electronic gateway that provides easy, online access to your medical records. With [employer], you can request a clinic appointment, read your test results, renew a prescription or communicate with your care team.     To access your existing account, please contact your [facility] or call [phone] for assistance.        Care EveryWhere ID     This is your Care EveryWhere ID. This could be used by other organizations to access your [facility]  records  [id number]         Blood Pressure from Last 3 Encounters:   09/22/17 111/76   08/22/17 124/78   07/20/16 122/80    Weight from Last 3 Encounters:   08/22/17 68 kg (150 lb)   08/22/17 68 kg (150 lb)   07/20/16 67.1 kg (148 lb)              We Performed the Following     OCT Optic Nerve RNFL Spectralis OU (both eyes)     OVF 24-2 Dynamic OU          Today's Medication Changes          These changes are accurate as of: 11/2/17 11:27 AM.  If you have any questions, ask your nurse or doctor.               Start taking these medicines.        Dose/Directions    brinzolamide-brimonidine 1-0.2 % ophthalmic suspension   Commonly known as:  SIMBRINZA   Used for:  Primary open angle glaucoma of both eyes, moderate stage   Started by:  Felicia Abreu MD        Dose:  1 drop   Place 1 drop Into the left eye 3 times daily   Quantity:  8 mL   Refills:  11         These medicines have changed or have updated prescriptions.        Dose/Directions    glucosamine chondroitin 500 complex Caps   This may have changed:  when to take this   Used for:  Osteoarthritides        Dose:  1500 mg   Take 1,500 mg by mouth daily.   Quantity:  200 capsule   Refills:  99            Where to get your medicines      These medications were sent to [id number] - [place], [place] - [address] AT [facility] [address]  [address], [place] [address]     Phone:  [phone]     brinzolamide-brimonidine 1-0.2 % ophthalmic suspension                Primary Care Provider Office Phone # Fax #    Maxim Marcum [phone]       [address] [address]        Equal Access to Services     ERASMO GUERRIER AH: Hadii ruth church Sojoe, waaxda luqadaha, qaybta kaalmada adeegyada, rehan kumar. So [facility] [phone].    ATENCIÓN: Si habla español, tiene a figueredo disposición servicios gratuitos de asistencia lingüística. Llame al  [phone].    We comply with applicable federal civil rights laws and [place] laws. We do not discriminate on the basis of race, color, national origin, age, disability, sex, sexual orientation, or gender identity.            Thank you!     Thank you for choosing EYE CLINIC  for your care. Our goal is always to provide you with excellent care. Hearing back from our patients is one way we can continue to improve our services. Please take a few minutes to complete the written survey that you may receive in the mail after your visit with us. Thank you!             Your Updated Medication List - Protect others around you: Learn how to safely use, store and throw away your medicines at www.disposemymeds.org.          This list is accurate as of: 11/2/17 11:27 AM.  Always use your most recent med list.                   Brand Name Dispense Instructions for use Diagnosis    aspirin 81 MG tablet      Take 1 tablet by mouth every morning    Routine general medical examination at a health care facility       brinzolamide-brimonidine 1-0.2 % ophthalmic suspension    SIMBRINZA    8 mL    Place 1 drop Into the left eye 3 times daily    Primary open angle glaucoma of both eyes, moderate stage       buPROPion 100 MG 12 hr tablet    WELLBUTRIN SR    200 tablet    Take 1 tablet (100 mg) by mouth 2 times daily    Depression, unspecified depression type       calcium 600 MG tablet      Take 1 tablet by mouth 2 times daily.    Routine general medical examination at a health care facility       clonazePAM 0.5 MG tablet    klonoPIN    12 tablet    Take 1 tablet (0.5 mg) by mouth 2 times daily as needed for anxiety    Anxiety       glucosamine chondroitin 500 complex Caps     200 capsule    Take 1,500 mg by mouth daily.    Osteoarthritides       latanoprost 0.005 % ophthalmic solution    XALATAN    7.5 mL    Place 1 drop into both eyes At Bedtime    Normal tension glaucoma of both eyes, mild stage       MULTIVITAMINS PO      Take by  mouth every morning

## 2017-11-02 NOTE — PATIENT INSTRUCTIONS
Patient will continue on Latanoprost which is a teal top drop at bedtime in both eyes and start Simbrinza (Brinzolamide/Brimonidine) which is a white top drop 2x/day (12 hours apart) in the LEFT EYE ONLY.    Patient will return to clinic in 1-2 months with repeat IOP check.

## 2017-11-03 ENCOUNTER — THERAPY VISIT (OUTPATIENT)
Dept: PHYSICAL THERAPY | Facility: CLINIC | Age: 62
End: 2017-11-03
Payer: COMMERCIAL

## 2017-11-03 DIAGNOSIS — Z98.890 S/P ORIF (OPEN REDUCTION INTERNAL FIXATION) FRACTURE: ICD-10-CM

## 2017-11-03 DIAGNOSIS — Z87.81 S/P ORIF (OPEN REDUCTION INTERNAL FIXATION) FRACTURE: ICD-10-CM

## 2017-11-03 DIAGNOSIS — R60.0 LOCALIZED EDEMA: ICD-10-CM

## 2017-11-03 DIAGNOSIS — S82.892A CLOSED FRACTURE OF LEFT ANKLE, INITIAL ENCOUNTER: ICD-10-CM

## 2017-11-03 PROCEDURE — 97530 THERAPEUTIC ACTIVITIES: CPT | Mod: GP

## 2017-11-03 PROCEDURE — 97110 THERAPEUTIC EXERCISES: CPT | Mod: GP

## 2017-11-03 PROCEDURE — 97112 NEUROMUSCULAR REEDUCATION: CPT | Mod: GP

## 2017-11-07 ENCOUNTER — OFFICE VISIT (OUTPATIENT)
Dept: ORTHOPEDICS | Facility: CLINIC | Age: 62
End: 2017-11-07

## 2017-11-07 DIAGNOSIS — M25.572 PAIN IN JOINT, ANKLE AND FOOT, LEFT: Primary | ICD-10-CM

## 2017-11-07 NOTE — LETTER
11/7/2017       RE: Poornima Hilton  [address] [place] [address]     Dear Colleague,    Thank you for referring your patient, Poornima Hilton, to the [facility] at [facility]. Please see a copy of my visit note below.    CHIEF COMPLAINT:  Status post left ankle open reduction and internal fixation performed on 08/25/2017.      HISTORY OF PRESENT ILLNESS:  Ms. Hilton presents today for further followup.  Denies to have any problems or complaints.  Reports to be extremely happy with the function of her ankle joint.      PHYSICAL EXAMINATION:  On today's visit, she presents with full range of motion of the ankle, hindfoot and midfoot joints.  Presents with skin that is intact.  Presents with a slight lack of dorsiflexion.  There is a very thin fat pad along the plantar aspect of the metatarsal heads.      RADIOGRAPHIC EVALUATION:  Three views of the left ankle were obtained today which were significant for showing full consolidation of the fracture sites.  Alignment is excellent.  Hardware is intact and in place.      ASSESSMENT:  Status post left ankle open reduction and internal fixation.      PLAN:  I discussed with the patient that she can proceed with activities as tolerated.  The patient will follow up on a p.r.n. basis.  I encouraged the patient to visit with us in 2-3 months from today if she is not happy with the function of her left ankle.      All questions were answered.      TT 15 minutes, CT 10 minutes     Sincerely,    Scooter Watson MD

## 2017-11-07 NOTE — PROGRESS NOTES
CHIEF COMPLAINT:  Status post left ankle open reduction and internal fixation performed on 08/25/2017.      HISTORY OF PRESENT ILLNESS:  Ms. Hilton presents today for further followup.  Denies to have any problems or complaints.  Reports to be extremely happy with the function of her ankle joint.      PHYSICAL EXAMINATION:  On today's visit, she presents with full range of motion of the ankle, hindfoot and midfoot joints.  Presents with skin that is intact.  Presents with a slight lack of dorsiflexion.  There is a very thin fat pad along the plantar aspect of the metatarsal heads.      RADIOGRAPHIC EVALUATION:  Three views of the left ankle were obtained today which were significant for showing full consolidation of the fracture sites.  Alignment is excellent.  Hardware is intact and in place.      ASSESSMENT:  Status post left ankle open reduction and internal fixation.      PLAN:  I discussed with the patient that she can proceed with activities as tolerated.  The patient will follow up on a p.r.n. basis.  I encouraged the patient to visit with us in 2-3 months from today if she is not happy with the function of her left ankle.      All questions were answered.      TT 15 minutes, CT 10 minutes

## 2017-11-07 NOTE — NURSING NOTE
Reason For Visit:   Chief Complaint   Patient presents with     RECHECK     Left ankle fx ORIF. DOS 8/25/17         Pain Assessment  Patient Currently in Pain: No

## 2017-11-07 NOTE — MR AVS SNAPSHOT
After Visit Summary   11/7/2017    Poornima Hilton    MRN: [id number]           Patient Information     Date Of Birth          1955        Visit Information        Provider Department      11/7/2017 8:30 AM Scooter Watson MD [facility]        Today's Diagnoses     Pain in joint, ankle and foot, left    -  1       Follow-ups after your visit        Follow-up notes from your care team     Return if symptoms worsen or fail to improve.      Your next 10 appointments already scheduled     Nov 17, 2017 11:00 AM CST   CARLO Extremity with Tavo Pennington PT   [facility] CARLO ([facility])    [address] [place] [id number] [phone]            Dec 01, 2017  2:50 PM CST   CARLO Extremity with Tavo Pennington PT   [facility] CAR[facility] ([facility])    [address] [place] [id number] [phone]            Dec 15, 2017  2:50 PM CST   CARLO Extremity with Tavo Pennington PT   [facility] ([facility])    [address] [place] [id number] [phone]            Dec 29, 2017  2:50 PM CST   CARLO Extremity with Tavo Pennington PT   [facility] CARLO ([facility])    [address] [place] [id number] [phone]            Jan 02, 2018  9:15 AM CST   RETURN GLAUCOMA with Felicia Abreu MD   Eye Clinic ([facility])    [facility] Clin [address]  [facility] [id number] [phone]              Who to contact     Please call your clinic at [phone] to:    Ask questions about your health    Make or cancel appointments    Discuss your medicines    Learn about your test results    Speak to your doctor   If you have compliments or concerns about an experience at your clinic, or if  you wish to file a complaint, please contact [facility] Patient Relations at [phone] or email us at [email or web address]         Additional Information About Your Visit        [employer] Information     [employer] gives you secure access to your electronic health record. If you see a primary care provider, you can also send messages to your care team and make appointments. If you have questions, please call your primary care clinic.  If you do not have a primary care provider, please call [phone] and they will assist you.      [employer] is an electronic gateway that provides easy, online access to your medical records. With [employer], you can request a clinic appointment, read your test results, renew a prescription or communicate with your care team.     To access your existing account, please contact your [facility] or call [phone] for assistance.        Care EveryWhere ID     This is your Care EveryWhere ID. This could be used by other organizations to access your [place] medical records  [id number]         Blood Pressure from Last 3 Encounters:   No data found for BP    Weight from Last 3 Encounters:   No data found for Wt              Today, you had the following     No orders found for display         Today's Medication Changes          These changes are accurate as of: 11/7/17 11:59 PM.  If you have any questions, ask your nurse or doctor.               These medicines have changed or have updated prescriptions.        Dose/Directions    glucosamine chondroitin 500 complex Caps   This may have changed:  when to take this   Used for:  Osteoarthritides        Dose:  1500 mg   Take 1,500 mg by mouth daily.   Quantity:  200 capsule   Refills:  99                Primary Care Provider Office Phone # Fax #    Maxim Marcum [phone]       [address] [place] [address]        Equal Access to  Services     [facility]: Hadii aad ku hadmilagroenid Trinityjoe, waagustinda luqadaha, qaybta kaalmada lisajessesurinder, rehan madera . So [facility] [phone].    ATENCIÓN: Si shahbaz bishop, tiene a figueredo disposición servicios gratuitos de asistencia lingüística. Llame al [phone].    We comply with applicable federal civil rights laws and [place] laws. We do not discriminate on the basis of race, color, national origin, age, disability, sex, sexual orientation, or gender identity.            Thank you!     Thank you for choosing [facility]  for your care. Our goal is always to provide you with excellent care. Hearing back from our patients is one way we can continue to improve our services. Please take a few minutes to complete the written survey that you may receive in the mail after your visit with us. Thank you!             Your Updated Medication List - Protect others around you: Learn how to safely use, store and throw away your medicines at www.disposemymeds.org.          This list is accurate as of: 11/7/17 11:59 PM.  Always use your most recent med list.                   Brand Name Dispense Instructions for use Diagnosis    aspirin 81 MG tablet      Take 1 tablet by mouth every morning    Routine general medical examination at a health care facility       brinzolamide-brimonidine 1-0.2 % ophthalmic suspension    SIMBRINZA    8 mL    Place 1 drop Into the left eye 3 times daily    Primary open angle glaucoma of both eyes, moderate stage       buPROPion 100 MG 12 hr tablet    WELLBUTRIN SR    200 tablet    Take 1 tablet (100 mg) by mouth 2 times daily    Depression, unspecified depression type       calcium 600 MG tablet      Take 1 tablet by mouth 2 times daily.    Routine general medical examination at a health care facility       clonazePAM 0.5 MG tablet    klonoPIN    12 tablet    Take 1 tablet (0.5 mg) by mouth 2 times daily as needed for anxiety    Anxiety       glucosamine  chondroitin 500 complex Caps     200 capsule    Take 1,500 mg by mouth daily.    Osteoarthritides       latanoprost 0.005 % ophthalmic solution    XALATAN    7.5 mL    Place 1 drop into both eyes At Bedtime    Normal tension glaucoma of both eyes, mild stage       MULTIVITAMINS PO      Take by mouth every morning

## 2017-11-10 ENCOUNTER — THERAPY VISIT (OUTPATIENT)
Dept: PHYSICAL THERAPY | Facility: CLINIC | Age: 62
End: 2017-11-10
Payer: COMMERCIAL

## 2017-11-10 DIAGNOSIS — Z87.81 S/P ORIF (OPEN REDUCTION INTERNAL FIXATION) FRACTURE: ICD-10-CM

## 2017-11-10 DIAGNOSIS — R60.0 LOCALIZED EDEMA: ICD-10-CM

## 2017-11-10 DIAGNOSIS — S82.892A CLOSED FRACTURE OF LEFT ANKLE, INITIAL ENCOUNTER: ICD-10-CM

## 2017-11-10 DIAGNOSIS — Z98.890 S/P ORIF (OPEN REDUCTION INTERNAL FIXATION) FRACTURE: ICD-10-CM

## 2017-11-10 PROCEDURE — 97110 THERAPEUTIC EXERCISES: CPT | Mod: GP

## 2017-11-10 PROCEDURE — 97530 THERAPEUTIC ACTIVITIES: CPT | Mod: GP

## 2017-11-10 PROCEDURE — 97112 NEUROMUSCULAR REEDUCATION: CPT | Mod: GP

## 2017-11-17 ENCOUNTER — THERAPY VISIT (OUTPATIENT)
Dept: PHYSICAL THERAPY | Facility: CLINIC | Age: 62
End: 2017-11-17
Payer: COMMERCIAL

## 2017-11-17 DIAGNOSIS — Z98.890 S/P ORIF (OPEN REDUCTION INTERNAL FIXATION) FRACTURE: ICD-10-CM

## 2017-11-17 DIAGNOSIS — S82.892A CLOSED FRACTURE OF LEFT ANKLE, INITIAL ENCOUNTER: ICD-10-CM

## 2017-11-17 DIAGNOSIS — R60.0 LOCALIZED EDEMA: ICD-10-CM

## 2017-11-17 DIAGNOSIS — Z87.81 S/P ORIF (OPEN REDUCTION INTERNAL FIXATION) FRACTURE: ICD-10-CM

## 2017-11-17 PROCEDURE — 97530 THERAPEUTIC ACTIVITIES: CPT | Mod: GP

## 2017-11-17 PROCEDURE — 97110 THERAPEUTIC EXERCISES: CPT | Mod: GP

## 2017-11-17 PROCEDURE — 97112 NEUROMUSCULAR REEDUCATION: CPT | Mod: GP

## 2017-12-01 ENCOUNTER — THERAPY VISIT (OUTPATIENT)
Dept: PHYSICAL THERAPY | Facility: CLINIC | Age: 62
End: 2017-12-01
Payer: COMMERCIAL

## 2017-12-01 DIAGNOSIS — R60.0 LOCALIZED EDEMA: ICD-10-CM

## 2017-12-01 DIAGNOSIS — S82.892A CLOSED FRACTURE OF LEFT ANKLE, INITIAL ENCOUNTER: ICD-10-CM

## 2017-12-01 DIAGNOSIS — Z98.890 S/P ORIF (OPEN REDUCTION INTERNAL FIXATION) FRACTURE: ICD-10-CM

## 2017-12-01 DIAGNOSIS — Z87.81 S/P ORIF (OPEN REDUCTION INTERNAL FIXATION) FRACTURE: ICD-10-CM

## 2017-12-01 PROCEDURE — 97110 THERAPEUTIC EXERCISES: CPT | Mod: GP

## 2017-12-01 PROCEDURE — 97530 THERAPEUTIC ACTIVITIES: CPT | Mod: GP

## 2017-12-01 PROCEDURE — 97112 NEUROMUSCULAR REEDUCATION: CPT | Mod: GP

## 2017-12-15 ENCOUNTER — THERAPY VISIT (OUTPATIENT)
Dept: PHYSICAL THERAPY | Facility: CLINIC | Age: 62
End: 2017-12-15
Payer: COMMERCIAL

## 2017-12-15 DIAGNOSIS — Z98.890 S/P ORIF (OPEN REDUCTION INTERNAL FIXATION) FRACTURE: ICD-10-CM

## 2017-12-15 DIAGNOSIS — S82.892A CLOSED FRACTURE OF LEFT ANKLE, INITIAL ENCOUNTER: ICD-10-CM

## 2017-12-15 DIAGNOSIS — R60.0 LOCALIZED EDEMA: ICD-10-CM

## 2017-12-15 DIAGNOSIS — Z87.81 S/P ORIF (OPEN REDUCTION INTERNAL FIXATION) FRACTURE: ICD-10-CM

## 2017-12-15 PROCEDURE — 97112 NEUROMUSCULAR REEDUCATION: CPT | Mod: GP

## 2017-12-15 PROCEDURE — 97110 THERAPEUTIC EXERCISES: CPT | Mod: GP

## 2017-12-15 PROCEDURE — 97530 THERAPEUTIC ACTIVITIES: CPT | Mod: GP

## 2018-01-02 ENCOUNTER — OFFICE VISIT (OUTPATIENT)
Dept: OPHTHALMOLOGY | Facility: CLINIC | Age: 63
End: 2018-01-02
Attending: OPHTHALMOLOGY
Payer: COMMERCIAL

## 2018-01-02 DIAGNOSIS — H25.13 NUCLEAR SENILE CATARACT OF BOTH EYES: ICD-10-CM

## 2018-01-02 DIAGNOSIS — H40.1132 PRIMARY OPEN ANGLE GLAUCOMA OF BOTH EYES, MODERATE STAGE: Primary | ICD-10-CM

## 2018-01-02 DIAGNOSIS — H40.1231 NORMAL TENSION GLAUCOMA OF BOTH EYES, MILD STAGE: ICD-10-CM

## 2018-01-02 PROCEDURE — G0463 HOSPITAL OUTPT CLINIC VISIT: HCPCS | Mod: ZF

## 2018-01-02 RX ORDER — LATANOPROST 50 UG/ML
1 SOLUTION/ DROPS OPHTHALMIC AT BEDTIME
Qty: 7.5 ML | Refills: 1 | Status: SHIPPED | OUTPATIENT
Start: 2018-01-02 | End: 2018-09-14

## 2018-01-02 ASSESSMENT — TONOMETRY
OD_IOP_MMHG: 15
OS_IOP_MMHG: 13
IOP_METHOD: APPLANATION
OD_IOP_MMHG: 18
IOP_METHOD: APPLANATION
OD_IOP_MMHG: 14
IOP_METHOD: APPLANATION
OS_IOP_MMHG: 16
OS_IOP_MMHG: 14

## 2018-01-02 ASSESSMENT — SLIT LAMP EXAM - LIDS
COMMENTS: NORMAL
COMMENTS: NORMAL

## 2018-01-02 ASSESSMENT — REFRACTION_WEARINGRX
OS_SPHERE: -0.75
OS_CYLINDER: +1.75
OD_CYLINDER: +1.25
OS_ADD: +2.50
OD_AXIS: 048
OD_SPHERE: -1.25
OD_ADD: +2.50
OS_AXIS: 150
SPECS_TYPE: PAL

## 2018-01-02 ASSESSMENT — VISUAL ACUITY
OS_CC: 20/20
CORRECTION_TYPE: GLASSES
OS_CC+: -1
OD_CC: 20/20
METHOD: SNELLEN - LINEAR

## 2018-01-02 ASSESSMENT — CUP TO DISC RATIO
OD_RATIO: 0.7
OS_RATIO: 0.8

## 2018-01-02 ASSESSMENT — EXTERNAL EXAM - LEFT EYE: OS_EXAM: NORMAL

## 2018-01-02 ASSESSMENT — CONF VISUAL FIELD
OS_NORMAL: 1
METHOD: COUNTING FINGERS
OD_NORMAL: 1

## 2018-01-02 ASSESSMENT — EXTERNAL EXAM - RIGHT EYE: OD_EXAM: NORMAL

## 2018-01-02 NOTE — PROGRESS NOTES
1)POAG/LTG vs Glc Suspect -- H/O DH OS, ??H/O Raynaud's per old notes, H/O low BP, anemia in past -- K pachy: 583/589   Tmax:20/19     HVF: OD:Full c fluct and OS:Nasal step (new in 11/17)     CDR: 0.7/0.8    HRT/OCT:tr RNFL thinning (?prog OS in 2017)      FHX of Glc: Mother -- had surgery, lost vision     Gonio: open      Intolerant to:      Asthma/COPD: No  Steroid Use:No     Kidney Stones:  No   Sulfa Allergy: No     IOP targets:  2)NS OU    Patient will continue on Latanoprost which is a teal top drop at bedtime in both eyes and Simbrinza (Brinzolamide/Brimonidine) which is a white top drop 2x/day (12 hours apart) in the LEFT EYE ONLY.    Patient will return to clinic in 4-6 months with repeat IOP check and visual field test (left eye only).      Attending Physician Attestation:  Complete documentation of historical and exam elements from today's encounter can be found in the full encounter summary report (not reduplicated in this progress note). I personally obtained the chief complaint(s) and history of present illness.  I confirmed and edited as necessary the review of systems, past medical/surgical history, family history, social history, and examination findings as documented by others; and I examined the patient myself. I personally reviewed the relevant tests, images, and reports as documented above. I formulated and edited as necessary the assessment and plan and discussed the findings and management plan with the patient and family.  - Felicia Abreu MD     Resident Note:  IOP 14/14 on MD recheck. Doing well with with simbrinza. Return to clinic 3-4 months repeat visual field    Jamil Hugo MD  PGY3, Dept of Ophthalmology  Pager [phone]

## 2018-01-02 NOTE — PATIENT INSTRUCTIONS
Patient will continue on Latanoprost which is a teal top drop at bedtime in both eyes and Simbrinza (Brinzolamide/Brimonidine) which is a white top drop 2x/day (12 hours apart) in the LEFT EYE ONLY.    Patient will return to clinic in 4-6 months with repeat IOP check and visual field test (left eye only).

## 2018-01-02 NOTE — NURSING NOTE
Chief Complaints and History of Present Illnesses   Patient presents with     Follow Up For     POAG     HPI    Affected eye(s):  Both   Symptoms:        Frequency:  Constant       Do you have eye pain now?:  No      Comments:  States va is the same since last visit  No red watery or dry  Precious Deng COT 9:11 AM January 2, 2018

## 2018-01-02 NOTE — MR AVS SNAPSHOT
After Visit Summary   1/2/2018    Poornima Hilton    MRN: [id number]           Patient Information     Date Of Birth          1955        Visit Information        Provider Department      1/2/2018 9:15 AM Felicia Abreu MD [facility]        Today's Diagnoses     Primary open angle glaucoma of both eyes, moderate stage    -  1    Normal tension glaucoma of both eyes, mild stage - Both Eyes        Nuclear senile cataract of both eyes          Care Instructions    Patient will continue on Latanoprost which is a teal top drop at bedtime in both eyes and Simbrinza (Brinzolamide/Brimonidine) which is a white top drop 2x/day (12 hours apart) in the LEFT EYE ONLY.    Patient will return to clinic in 4-6 months with repeat IOP check and visual field test (left eye only).            Follow-ups after your visit        Follow-up notes from your care team     Return 4-6 months with repeat IOP check and visual field test (left eye only)..      Your next 10 appointments already scheduled     Jan 12, 2018  3:30 PM CST   CARLO Extremity with Tavo Pennington PT   [facility] CARLO ([facility])    [address] [place] [id number] [phone]            May 17, 2018  2:00 PM CDT   RETURN GLAUCOMA with Felicia Abreu MD   Eye Clinic ([facility])    [facility] [address] Clin [address]  [facility] [phone]              Who to contact     Please call your clinic at [phone] to:    Ask questions about your health    Make or cancel appointments    Discuss your medicines    Learn about your test results    Speak to your doctor   If you have compliments or concerns about an experience at your clinic, or if you wish to file a complaint, please contact [facility] Patient Relations at [phone] or email us at [email or web address]         Additional Information  About Your Visit        [employer] Information     [employer] gives you secure access to your electronic health record. If you see a primary care provider, you can also send messages to your care team and make appointments. If you have questions, please call your primary care clinic.  If you do not have a primary care provider, please call [phone] and they will assist you.      [employer] is an electronic gateway that provides easy, online access to your medical records. With [employer], you can request a clinic appointment, read your test results, renew a prescription or communicate with your care team.     To access your existing account, please contact your [facility] or call [phone] for assistance.        Care EveryWhere ID     This is your Care EveryWhere ID. This could be used by other organizations to access your [place] medical records  [id number]         Blood Pressure from Last 3 Encounters:   09/22/17 111/76   08/22/17 124/78   07/20/16 122/80    Weight from Last 3 Encounters:   08/22/17 68 kg (150 lb)   08/22/17 68 kg (150 lb)   07/20/16 67.1 kg (148 lb)              Today, you had the following     No orders found for display         Today's Medication Changes          These changes are accurate as of: 1/2/18 10:16 AM.  If you have any questions, ask your nurse or doctor.               These medicines have changed or have updated prescriptions.        Dose/Directions    glucosamine chondroitin 500 complex Caps   This may have changed:  when to take this   Used for:  Osteoarthritides        Dose:  1500 mg   Take 1,500 mg by mouth daily.   Quantity:  200 capsule   Refills:  99            Where to get your medicines      These medications were sent to [id number] - [place], [place] - [address] [facility] [address]  [facility] [address] [place] [address]     Phone:  [phone]     latanoprost 0.005 % ophthalmic solution                 Primary Care Provider Office Phone # Fax #    Maxim Marcum [phone]       [address] [place] [address]        Equal Access to Services     ERASMO GUERRIER : Hadii ruth hoyos jamaicao Soanyiali, waaxda luqadaha, qaybta kaalmada adezarada, rehan clarke laLauraraymon kumar. So [facility] [phone].    ATENCIÓN: Si habla español, tiene a figueredo disposición servicios gratuitos de asistencia lingüística. Migdaliaame al [phone].    We comply with applicable federal civil rights laws and [place] laws. We do not discriminate on the basis of race, color, national origin, age, disability, sex, sexual orientation, or gender identity.            Thank you!     Thank you for choosing EYE CLINIC  for your care. Our goal is always to provide you with excellent care. Hearing back from our patients is one way we can continue to improve our services. Please take a few minutes to complete the written survey that you may receive in the mail after your visit with us. Thank you!             Your Updated Medication List - Protect others around you: Learn how to safely use, store and throw away your medicines at www.disposemymeds.org.          This list is accurate as of: 1/2/18 10:16 AM.  Always use your most recent med list.                   Brand Name Dispense Instructions for use Diagnosis    aspirin 81 MG tablet      Take 1 tablet by mouth every morning    Routine general medical examination at a health care facility       brinzolamide-brimonidine 1-0.2 % ophthalmic suspension    SIMBRINZA    8 mL    Place 1 drop Into the left eye 3 times daily    Primary open angle glaucoma of both eyes, moderate stage       buPROPion 100 MG 12 hr tablet    WELLBUTRIN SR    200 tablet    Take 1 tablet (100 mg) by mouth 2 times daily    Depression, unspecified depression type       calcium 600 MG tablet      Take 1 tablet by mouth 2 times daily.    Routine general medical examination at a health care  facility       clonazePAM 0.5 MG tablet    klonoPIN    12 tablet    Take 1 tablet (0.5 mg) by mouth 2 times daily as needed for anxiety    Anxiety       glucosamine chondroitin 500 complex Caps     200 capsule    Take 1,500 mg by mouth daily.    Osteoarthritides       latanoprost 0.005 % ophthalmic solution    XALATAN    7.5 mL    Place 1 drop into both eyes At Bedtime    Normal tension glaucoma of both eyes, mild stage       MULTIVITAMINS PO      Take by mouth every morning

## 2018-01-03 ENCOUNTER — TELEPHONE (OUTPATIENT)
Dept: ORTHOPEDICS | Facility: CLINIC | Age: 63
End: 2018-01-03

## 2018-01-03 ENCOUNTER — OFFICE VISIT (OUTPATIENT)
Dept: ORTHOPEDICS | Facility: CLINIC | Age: 63
End: 2018-01-03
Payer: COMMERCIAL

## 2018-01-03 ENCOUNTER — NURSE TRIAGE (OUTPATIENT)
Dept: NURSING | Facility: CLINIC | Age: 63
End: 2018-01-03

## 2018-01-03 ENCOUNTER — RADIANT APPOINTMENT (OUTPATIENT)
Dept: GENERAL RADIOLOGY | Facility: CLINIC | Age: 63
End: 2018-01-03
Attending: FAMILY MEDICINE
Payer: COMMERCIAL

## 2018-01-03 VITALS
HEIGHT: 65 IN | BODY MASS INDEX: 25.16 KG/M2 | WEIGHT: 151 LBS | SYSTOLIC BLOOD PRESSURE: 124 MMHG | DIASTOLIC BLOOD PRESSURE: 84 MMHG

## 2018-01-03 DIAGNOSIS — M79.672 LEFT FOOT PAIN: ICD-10-CM

## 2018-01-03 DIAGNOSIS — M79.672 LEFT FOOT PAIN: Primary | ICD-10-CM

## 2018-01-03 NOTE — LETTER
1/3/2018       RE: Poornima Hilton  [address] [place] [address]     Dear Colleague,    Thank you for referring your patient, Poornima Hilton, to the [facility] SPORTS AND ORTHOPAEDIC WALK IN CLINIC at [facility]. Please see a copy of my visit note below.          SPORTS & ORTHOPEDIC WALK-IN VISIT 1/3/2018    Primary Care Physician:      Reason for visit:     What part of your body is injured / painful?  left big toe    What caused the injury /pain? Fall - rushing to the phone and thinks she hyper-extended toe    How long ago did your injury occur or pain begin? yesterday    What are your most bothersome symptoms? Pain and Other: bruising    How would you characterize your symptom?  dull    What makes your symptoms better? Rest and Ice    What makes your symptoms worse? Walking and Movement    Have you been previously seen for this problem? No    Medical History:    Any recent changes to your medical history? No    Any new medication prescribed since last visit? No    Have you had surgery on this body part before? Surgery with Walter in August on same ankle: left ankle open reduction and internal fixation performed on 08/25/2017    Social History:    Occupation:  for [employer]     Handedness: Right    Exercise: 3-4 days/week    Review of Systems:    Do you have fever, chills, weight loss? No    Do you have any vision problems? Yes, early glaucoma    Do you have any chest pain or edema? No    Do you have any shortness of breath or wheezing?  No    Do you have stomach problems? No    Do you have any numbness or focal weakness? Yes, residual from the surgery     Do you have diabetes? No    Do you have problems with bleeding or clotting? No    Do you have an rashes or other skin lesions? No           CHIEF COMPLAINT:  New Patient (Left foot)       HISTORY OF PRESENT ILLNESS  Ms. Hilton is a pleasant 62 year old year old female who presents to clinic today with left foot  pain.  Poornima explains that she was walking in her home yesterday and tripped forward on her tony.  Her left great toe hyperextended (due to inability to dorsiflex ankle relating to recent ORIF per patient) and caused pain of 1st MTP region.  Associated swelling and ecchymosis.  Ambulation, flexion /extension exacerbate pain.  Improved with rest, ice.    Additional history: as documented; hx of ORIF left ankle in August. Currently in Rehab    MEDICAL HISTORY  Patient Active Problem List   Diagnosis     Mild recurrent major depression (H)     CARDIOVASCULAR SCREENING; LDL GOAL LESS THAN 160     Primary open angle glaucoma of both eyes, mild stage     S/P ORIF (open reduction internal fixation) fracture     Ankle fracture     Localized edema     Primary open angle glaucoma of both eyes, moderate stage       Current Outpatient Prescriptions   Medication Sig Dispense Refill     latanoprost (XALATAN) 0.005 % ophthalmic solution Place 1 drop into both eyes At Bedtime 7.5 mL 1     brinzolamide-brimonidine (SIMBRINZA) 1-0.2 % ophthalmic suspension Place 1 drop Into the left eye 3 times daily 8 mL 11     buPROPion (WELLBUTRIN SR) 100 MG 12 hr tablet Take 1 tablet (100 mg) by mouth 2 times daily 200 tablet 3     clonazePAM (KLONOPIN) 0.5 MG tablet Take 1 tablet (0.5 mg) by mouth 2 times daily as needed for anxiety 12 tablet 0     Glucosamine-Chondroit-Vit C-Mn (GLUCOSAMINE CHONDR 500 COMPLEX) CAPS Take 1,500 mg by mouth daily. (Patient taking differently: Take 1,500 mg by mouth 2 times daily ) 200 capsule 99     Multiple Vitamin (MULTIVITAMINS PO) Take by mouth every morning        Calcium 600 MG tablet Take 1 tablet by mouth 2 times daily.       aspirin 81 MG tablet Take 1 tablet by mouth every morning          No Known Allergies    Family History   Problem Relation Age of Onset     CEREBROVASCULAR DISEASE Mother      Depression Mother      Eye Disorder Mother      macular degeneration     Psychotic Disorder Mother       "Respiratory Mother      Glaucoma Mother      Cancer - colorectal Father      Arthritis Father      Cardiovascular Father      HEART DISEASE Father      Thyroid Disease Father      Sleep Apnea Father      Eye Disorder Maternal Grandfather      Glaucoma Maternal Grandfather      Macular Degeneration Maternal Grandfather      CEREBROVASCULAR DISEASE Paternal Grandmother      Arthritis Paternal Grandmother      Unknown/Adopted Paternal Grandfather        Additional medical/Social/Surgical histories reviewed in [facility] and updated as appropriate.     REVIEW OF SYSTEMS (1/3/2018)  CONSTITUTIONAL: Denies fever and weight loss  EYES: Denies acute vision changes  ENT: Denies hearing changes or difficulty swallowing  CARDIAC: Denies chest pain or edema  RESPIRATORY: Denies dyspnea, cough or wheeze  GASTROINTESTINAL: Denies abdominal pain, nausea, vomiting  MUSCULOSKELETAL: See HPI  SKIN: Denies any recent rash or lesion  NEUROLOGICAL: Denies numbness or focal weakness  PSYCHIATRIC: No history of psychiatric symptoms or problems  ENDOCRINE: Denies current diagnosis of diabetes  HEMATOLOGY: Denies episodes of easy bleeding      PHYSICAL EXAM  /84  Ht 1.651 m (5' 5\")  Wt 68.5 kg (151 lb)  BMI 25.13 kg/m2    General Appearance: Well appearing, alert, in no acute distress, well-hydrated, and well nourished  Cardiovascular: no signs of upper or lower extremity edema  Respiratory: no respiratory distress, no audible wheezing, no labored breathing, symmetric thoracic excursion  Psychiatric: mood and affect are appropriate, patient is oriented to time, place and person  Musculoskeletal - Left foot  - stance: Near normal gait but refusing to perform toe off on left foot  - inspection:Ecchymosis and swelling at 1st MTP joint,   - palpation: Exquisite TTP at 1st MTP joint circumferentially  - ROM: normal active and passive ROM of lesser toes, Pain with extension of great toe >20 degrees, flexion of toe less painful but limited.   - " strength: Extensor hallucis intact overall 4/5 but painful.  Full 5/5 flexion of hallux  -Strength of ankle grossly intact in all planes.   Neuro  - no sensory or motor deficit, grossly normal coordination, normal muscle tone  Skin  - no ecchymosis, erythema, warmth, or induration, no obvious rash  Psych  - interactive, appropriate, normal mood and affect    IMAGING : left foot XR. Final results and radiologist's interpretation, available in the [facility] health record. Images were reviewed with the patient/family members in the office today. My personal interpretation of the performed imaging is joint space narrowing of 1st MTP joint. No acute osseous abnormalities.     ASSESSMENT & PLAN  Ms. Hilton is a 62 year old year old female who presents to clinic today with pain and swelling of left 1st toe after hyperextension type injury x 1 day.    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    Yoel Kerr DO, CAQSM  Primary Care Sports Medicine

## 2018-01-03 NOTE — TELEPHONE ENCOUNTER
[facility]:  Nursing Note  SITUATION                                                      Poornima Hilton is a 62 year old female who calls with pain in left great toe after fall on 1/2/2018.    BACKGROUND                                                      Patient is s/p left ankle ORIF on 8/25/2017.    Date of last clinic appointment: on 11/7/2017 with Dr. Watson    Does patient have a future appointment scheduled?  No    Provider documentation from last clinic visit reviewed in EPIC.    ASSESSMENT      pain/symptom assessment - Patient states she fell on 1/2/2018 and has new pain in left great toe.  Patient states she is able to move the toe, but has c/o pain of 0/10 at rest and 3/10 with weight bearing.  Patient states toe is swollen.      PLAN                                                      Nursing Interventions: Patient was provided with information on scheduling an appointment with Sports Medicine or being seen in the Sports & Orthopaedic Walk-In Clinic.  RECOMMENDED DISPOSITION: To be seen in clinic - as noted above.  Will comply with recommendation: Yes    Patient/family can be reached at: N/A    If further questions/concerns or if symptoms do not improve, worsen or new symptoms develop, patient/family are advised to call [phone], option #3 to speak with a triage nurse, as soon as possible.    Rosy Busch

## 2018-01-03 NOTE — PROGRESS NOTES
SPORTS & ORTHOPEDIC WALK-IN VISIT 1/3/2018    Primary Care Physician:      Reason for visit:     What part of your body is injured / painful?  left big toe    What caused the injury /pain? Fall - rushing to the phone and thinks she hyper-extended toe    How long ago did your injury occur or pain begin? yesterday    What are your most bothersome symptoms? Pain and Other: bruising    How would you characterize your symptom?  dull    What makes your symptoms better? Rest and Ice    What makes your symptoms worse? Walking and Movement    Have you been previously seen for this problem? No    Medical History:    Any recent changes to your medical history? No    Any new medication prescribed since last visit? No    Have you had surgery on this body part before? Surgery with Walter in August on same ankle: left ankle open reduction and internal fixation performed on 08/25/2017    Social History:    Occupation:  for [employer]     Handedness: Right    Exercise: 3-4 days/week    Review of Systems:    Do you have fever, chills, weight loss? No    Do you have any vision problems? Yes, early glaucoma    Do you have any chest pain or edema? No    Do you have any shortness of breath or wheezing?  No    Do you have stomach problems? No    Do you have any numbness or focal weakness? Yes, residual from the surgery     Do you have diabetes? No    Do you have problems with bleeding or clotting? No    Do you have an rashes or other skin lesions? No

## 2018-01-03 NOTE — MR AVS SNAPSHOT
After Visit Summary   1/3/2018    Poornima Hilton    MRN: [id number]           Patient Information     Date Of Birth          1955        Visit Information        Provider Department      1/3/2018 11:20 AM Yoel Kerr DO [facility]        Today's Diagnoses     Left foot pain    -  1       Follow-ups after your visit        Your next 10 appointments already scheduled     Jan 12, 2018  3:30 PM CST   CARLO Extremity with MAURICIO Trimble [facility] Physical Therapy CARLO ([facility] [[email or web address] and Surgery [place])    [address] [facility] [address] [facility] [id number] [phone]            May 17, 2018  2:00 PM CDT   RETURN GLAUCOMA with Felicia Abreu MD   Eye Clinic ([facility])    [facility]  [address]  [facility] [id number] [phone]              Who to contact     Please call your clinic at [phone] to:    Ask questions about your health    Make or cancel appointments    Discuss your medicines    Learn about your test results    Speak to your doctor   If you have compliments or concerns about an experience at your clinic, or if you wish to file a complaint, please contact [facility] Patient Relations at [phone] or email us at [email or web address]         Additional Information About Your Visit        MyChart Information     [employer] gives you secure access to your electronic health record. If you see a primary care provider, you can also send messages to your care team and make appointments. If you have questions, please call your primary care clinic.  If you do not have a primary care provider, please call [phone] and they will assist you.      [employer] is an electronic gateway that provides easy, online access to your medical records. With [employer], you can request a clinic appointment, read your test results, renew a prescription or communicate  "with your care team.     To access your existing account, please contact your [facility] or call [phone] for assistance.        Care EveryWhere ID     This is your Care EveryWhere ID. This could be used by other organizations to access your [place] medical records  [id number]        Your Vitals Were     Height BMI (Body Mass Index)                1.651 m (5' 5\") 25.13 kg/m2           Blood Pressure from Last 3 Encounters:   01/03/18 124/84   09/22/17 111/76   08/22/17 124/78    Weight from Last 3 Encounters:   01/03/18 68.5 kg (151 lb)   08/22/17 68 kg (150 lb)   08/22/17 68 kg (150 lb)                 Today's Medication Changes          These changes are accurate as of: 1/3/18 11:59 PM.  If you have any questions, ask your nurse or doctor.               These medicines have changed or have updated prescriptions.        Dose/Directions    glucosamine chondroitin 500 complex Caps   This may have changed:  when to take this   Used for:  Osteoarthritides        Dose:  1500 mg   Take 1,500 mg by mouth daily.   Quantity:  200 capsule   Refills:  99                Primary Care Provider Office Phone # Fax #    Maxim Anselmo Marcum [phone]       [address] [place] [address]        Equal Access to Services     ERASMO GUERRIER AH: [employer] Sojoe, waaxda luqadaha, qaybta kaalmada alexandre, rehan kumar. So [facility] [phone].    ATENCIÓN: Si habla español, tiene a figueredo disposición servicios gratuitos de asistencia lingüística. Llame al [phone].    We comply with applicable federal civil rights laws and [place] laws. We do not discriminate on the basis of race, color, national origin, age, disability, sex, sexual orientation, or gender identity.            Thank you!     Thank you for choosing [facility] SPORTS AND ORTHOPAEDIC WALK IN CLINIC  for your care. Our goal is always to provide you with excellent " care. Hearing back from our patients is one way we can continue to improve our services. Please take a few minutes to complete the written survey that you may receive in the mail after your visit with us. Thank you!             Your Updated Medication List - Protect others around you: Learn how to safely use, store and throw away your medicines at www.disposemymeds.org.          This list is accurate as of: 1/3/18 11:59 PM.  Always use your most recent med list.                   Brand Name Dispense Instructions for use Diagnosis    aspirin 81 MG tablet      Take 1 tablet by mouth every morning    Routine general medical examination at a health care facility       brinzolamide-brimonidine 1-0.2 % ophthalmic suspension    SIMBRINZA    8 mL    Place 1 drop Into the left eye 3 times daily    Primary open angle glaucoma of both eyes, moderate stage       buPROPion 100 MG 12 hr tablet    WELLBUTRIN SR    200 tablet    Take 1 tablet (100 mg) by mouth 2 times daily    Depression, unspecified depression type       calcium 600 MG tablet      Take 1 tablet by mouth 2 times daily.    Routine general medical examination at a health care facility       clonazePAM 0.5 MG tablet    klonoPIN    12 tablet    Take 1 tablet (0.5 mg) by mouth 2 times daily as needed for anxiety    Anxiety       glucosamine chondroitin 500 complex Caps     200 capsule    Take 1,500 mg by mouth daily.    Osteoarthritides       latanoprost 0.005 % ophthalmic solution    XALATAN    7.5 mL    Place 1 drop into both eyes At Bedtime    Normal tension glaucoma of both eyes, mild stage       MULTIVITAMINS PO      Take by mouth every morning

## 2018-01-03 NOTE — TELEPHONE ENCOUNTER
Poornima had ankle surgery four months ago and fell yesterday and hurt the left foot/toe and it is the same one she had surgery on.  FNA advised to phone surgeon first and if can't get appointment to call back and can schedule with ortho.  Poornima agreed.

## 2018-01-03 NOTE — PROGRESS NOTES
CHIEF COMPLAINT:  New Patient (Left foot)       HISTORY OF PRESENT ILLNESS  Ms. Hilton is a pleasant 62 year old year old female who presents to clinic today with left foot pain.  Poornima explains that she was walking in her home yesterday and tripped forward on her tony.  Her left great toe hyperextended (due to inability to dorsiflex ankle relating to recent ORIF per patient) and caused pain of 1st MTP region.  Associated swelling and ecchymosis.  Ambulation, flexion /extension exacerbate pain.  Improved with rest, ice.    Additional history: as documented; hx of ORIF left ankle in August. Currently in Rehab    MEDICAL HISTORY  Patient Active Problem List   Diagnosis     Mild recurrent major depression (H)     CARDIOVASCULAR SCREENING; LDL GOAL LESS THAN 160     Primary open angle glaucoma of both eyes, mild stage     S/P ORIF (open reduction internal fixation) fracture     Ankle fracture     Localized edema     Primary open angle glaucoma of both eyes, moderate stage       Current Outpatient Prescriptions   Medication Sig Dispense Refill     latanoprost (XALATAN) 0.005 % ophthalmic solution Place 1 drop into both eyes At Bedtime 7.5 mL 1     brinzolamide-brimonidine (SIMBRINZA) 1-0.2 % ophthalmic suspension Place 1 drop Into the left eye 3 times daily 8 mL 11     buPROPion (WELLBUTRIN SR) 100 MG 12 hr tablet Take 1 tablet (100 mg) by mouth 2 times daily 200 tablet 3     clonazePAM (KLONOPIN) 0.5 MG tablet Take 1 tablet (0.5 mg) by mouth 2 times daily as needed for anxiety 12 tablet 0     Glucosamine-Chondroit-Vit C-Mn (GLUCOSAMINE CHONDR 500 COMPLEX) CAPS Take 1,500 mg by mouth daily. (Patient taking differently: Take 1,500 mg by mouth 2 times daily ) 200 capsule 99     Multiple Vitamin (MULTIVITAMINS PO) Take by mouth every morning        Calcium 600 MG tablet Take 1 tablet by mouth 2 times daily.       aspirin 81 MG tablet Take 1 tablet by mouth every morning          No Known Allergies    Family History  "  Problem Relation Age of Onset     CEREBROVASCULAR DISEASE Mother      Depression Mother      Eye Disorder Mother      macular degeneration     Psychotic Disorder Mother      Respiratory Mother      Glaucoma Mother      Cancer - colorectal Father      Arthritis Father      Cardiovascular Father      HEART DISEASE Father      Thyroid Disease Father      Sleep Apnea Father      Eye Disorder Maternal Grandfather      Glaucoma Maternal Grandfather      Macular Degeneration Maternal Grandfather      CEREBROVASCULAR DISEASE Paternal Grandmother      Arthritis Paternal Grandmother      Unknown/Adopted Paternal Grandfather        Additional medical/Social/Surgical histories reviewed in [facility] and updated as appropriate.     REVIEW OF SYSTEMS (1/3/2018)  CONSTITUTIONAL: Denies fever and weight loss  EYES: Denies acute vision changes  ENT: Denies hearing changes or difficulty swallowing  CARDIAC: Denies chest pain or edema  RESPIRATORY: Denies dyspnea, cough or wheeze  GASTROINTESTINAL: Denies abdominal pain, nausea, vomiting  MUSCULOSKELETAL: See HPI  SKIN: Denies any recent rash or lesion  NEUROLOGICAL: Denies numbness or focal weakness  PSYCHIATRIC: No history of psychiatric symptoms or problems  ENDOCRINE: Denies current diagnosis of diabetes  HEMATOLOGY: Denies episodes of easy bleeding      PHYSICAL EXAM  /84  Ht 1.651 m (5' 5\")  Wt 68.5 kg (151 lb)  BMI 25.13 kg/m2    General Appearance: Well appearing, alert, in no acute distress, well-hydrated, and well nourished  Cardiovascular: no signs of upper or lower extremity edema  Respiratory: no respiratory distress, no audible wheezing, no labored breathing, symmetric thoracic excursion  Psychiatric: mood and affect are appropriate, patient is oriented to time, place and person  Musculoskeletal - Left foot  - stance: Near normal gait but refusing to perform toe off on left foot  - inspection:Ecchymosis and swelling at 1st MTP joint,   - palpation: Exquisite TTP at " 1st MTP joint circumferentially  - ROM: normal active and passive ROM of lesser toes, Pain with extension of great toe >20 degrees, flexion of toe less painful but limited.   - strength: Extensor hallucis intact overall 4/5 but painful.  Full 5/5 flexion of hallux  -Strength of ankle grossly intact in all planes.   Neuro  - no sensory or motor deficit, grossly normal coordination, normal muscle tone  Skin  - no ecchymosis, erythema, warmth, or induration, no obvious rash  Psych  - interactive, appropriate, normal mood and affect    IMAGING : left foot XR. Final results and radiologist's interpretation, available in the [facility] health record. Images were reviewed with the patient/family members in the office today. My personal interpretation of the performed imaging is joint space narrowing of 1st MTP joint. No acute osseous abnormalities.     ASSESSMENT & PLAN  Ms. Hilton is a 62 year old year old female who presents to clinic today with pain and swelling of left 1st toe after hyperextension type injury x 1 day.    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    Yoel Kerr DO, CAQSM  Primary Care Sports Medicine

## 2018-01-12 ENCOUNTER — THERAPY VISIT (OUTPATIENT)
Dept: PHYSICAL THERAPY | Facility: CLINIC | Age: 63
End: 2018-01-12
Payer: COMMERCIAL

## 2018-01-12 DIAGNOSIS — Z87.81 S/P ORIF (OPEN REDUCTION INTERNAL FIXATION) FRACTURE: ICD-10-CM

## 2018-01-12 DIAGNOSIS — S82.892A CLOSED FRACTURE OF LEFT ANKLE, INITIAL ENCOUNTER: ICD-10-CM

## 2018-01-12 DIAGNOSIS — R60.0 LOCALIZED EDEMA: ICD-10-CM

## 2018-01-12 DIAGNOSIS — Z98.890 S/P ORIF (OPEN REDUCTION INTERNAL FIXATION) FRACTURE: ICD-10-CM

## 2018-01-12 PROCEDURE — 97530 THERAPEUTIC ACTIVITIES: CPT | Mod: GP

## 2018-01-12 PROCEDURE — 97112 NEUROMUSCULAR REEDUCATION: CPT | Mod: GP

## 2018-05-17 ENCOUNTER — OFFICE VISIT (OUTPATIENT)
Dept: OPHTHALMOLOGY | Facility: CLINIC | Age: 63
End: 2018-05-17
Attending: OPHTHALMOLOGY
Payer: COMMERCIAL

## 2018-05-17 DIAGNOSIS — H40.9 GLAUCOMA: ICD-10-CM

## 2018-05-17 DIAGNOSIS — H40.1132 PRIMARY OPEN ANGLE GLAUCOMA OF BOTH EYES, MODERATE STAGE: Primary | ICD-10-CM

## 2018-05-17 DIAGNOSIS — H40.1131 PRIMARY OPEN ANGLE GLAUCOMA OF BOTH EYES, MILD STAGE: ICD-10-CM

## 2018-05-17 PROCEDURE — 92083 EXTENDED VISUAL FIELD XM: CPT | Mod: ZF | Performed by: OPHTHALMOLOGY

## 2018-05-17 PROCEDURE — G0463 HOSPITAL OUTPT CLINIC VISIT: HCPCS | Mod: ZF

## 2018-05-17 ASSESSMENT — REFRACTION_WEARINGRX
OD_AXIS: 048
OD_SPHERE: -1.25
OD_CYLINDER: +1.25
OS_ADD: +2.50
OS_CYLINDER: +1.75
OS_SPHERE: -0.75
OS_AXIS: 150
OD_ADD: +2.50

## 2018-05-17 ASSESSMENT — SLIT LAMP EXAM - LIDS
COMMENTS: NORMAL
COMMENTS: NORMAL

## 2018-05-17 ASSESSMENT — EXTERNAL EXAM - RIGHT EYE: OD_EXAM: NORMAL

## 2018-05-17 ASSESSMENT — CONF VISUAL FIELD
OD_NORMAL: 1
OS_NORMAL: 1

## 2018-05-17 ASSESSMENT — VISUAL ACUITY
OS_CC: 20/25
METHOD: SNELLEN - LINEAR
CORRECTION_TYPE: GLASSES
OD_CC: 20/20

## 2018-05-17 ASSESSMENT — EXTERNAL EXAM - LEFT EYE: OS_EXAM: NORMAL

## 2018-05-17 ASSESSMENT — CUP TO DISC RATIO
OS_RATIO: 0.8
OD_RATIO: 0.7

## 2018-05-17 ASSESSMENT — TONOMETRY
OD_IOP_MMHG: 16
OS_IOP_MMHG: 14
IOP_METHOD: APPLANATION

## 2018-05-17 NOTE — MR AVS SNAPSHOT
After Visit Summary   5/17/2018    Poornima Hilton    MRN: [id number]           Patient Information     Date Of Birth          1955        Visit Information        Provider Department      5/17/2018 2:00 PM Felicia Abreu MD [facility]        Today's Diagnoses     Primary open angle glaucoma of both eyes, moderate stage    -  1    Glaucoma        Primary open angle glaucoma of both eyes, mild stage          Care Instructions    Patient will continue on Latanoprost which is a teal top drop at bedtime in both eyes and Simbrinza (Brinzolamide/Brimonidine) which is a white top drop 2x/day (12 hours apart) in the LEFT EYE ONLY.    Patient will return to clinic in 6-9 months with repeat IOP check and visual field test, dilated eye exam and OCT with RNFL analysis.              Follow-ups after your visit        Follow-up notes from your care team     Return 6-9 months with repeat IOP check and visual field test, dilated eye exam and OCT with RNFL analysis..      Who to contact     Please call your clinic at [phone] to:    Ask questions about your health    Make or cancel appointments    Discuss your medicines    Learn about your test results    Speak to your doctor            Additional Information About Your Visit        [employer] Information     [employer] gives you secure access to your electronic health record. If you see a primary care provider, you can also send messages to your care team and make appointments. If you have questions, please call your primary care clinic.  If you do not have a primary care provider, please call [phone] and they will assist you.      [employer] is an electronic gateway that provides easy, online access to your medical records. With [employer], you can request a clinic appointment, read your test results, renew a prescription or communicate with your care team.     To access your existing account, please contact your [facility] or call  [phone] for assistance.        Care EveryWhere ID     This is your Care EveryWhere ID. This could be used by other organizations to access your [place] medical records  [id number]         Blood Pressure from Last 3 Encounters:   01/03/18 124/84   09/22/17 111/76   08/22/17 124/78    Weight from Last 3 Encounters:   01/03/18 68.5 kg (151 lb)   08/22/17 68 kg (150 lb)   08/22/17 68 kg (150 lb)              We Performed the Following     OVF 24-2 Dynamic OS          Today's Medication Changes          These changes are accurate as of 5/17/18 11:59 PM.  If you have any questions, ask your nurse or doctor.               These medicines have changed or have updated prescriptions.        Dose/Directions    glucosamine chondroitin 500 complex Caps   This may have changed:  when to take this   Used for:  Osteoarthritides        Dose:  1500 mg   Take 1,500 mg by mouth daily.   Quantity:  200 capsule   Refills:  99                Primary Care Provider Office Phone # Fax #    Maxim Marcum [phone]       [address] [place] [id number]        Equal Access to Services     GRACE [facility] CountySC[facility]LET : Hadii ruth Cooley, treasure jiang, rehan hancock . So [facility] [phone].    ATENCIÓN: Si habla español, tiene a figueredo disposición servicios gratuitos de asistencia lingüística. [facility] [phone].    We comply with applicable federal civil rights laws and [place] laws. We do not discriminate on the basis of race, color, national origin, age, disability, sex, sexual orientation, or gender identity.            Thank you!     Thank you for choosing EYE CLINIC  for your care. Our goal is always to provide you with excellent care. Hearing back from our patients is one way we can continue to improve our services. Please take a few minutes to complete the written survey that you may receive in the mail after your visit with us. Thank  you!             Your Updated Medication List - Protect others around you: Learn how to safely use, store and throw away your medicines at www.disposemymeds.org.          This list is accurate as of 5/17/18 11:59 PM.  Always use your most recent med list.                   Brand Name Dispense Instructions for use Diagnosis    aspirin 81 MG tablet      Take 1 tablet by mouth every morning    Routine general medical examination at a health care facility       brinzolamide-brimonidine 1-0.2 % ophthalmic suspension    SIMBRINZA    8 mL    Place 1 drop Into the left eye 3 times daily    Primary open angle glaucoma of both eyes, moderate stage       buPROPion 100 MG 12 hr tablet    WELLBUTRIN SR    200 tablet    Take 1 tablet (100 mg) by mouth 2 times daily    Depression, unspecified depression type       calcium 600 MG tablet      Take 1 tablet by mouth 2 times daily.    Routine general medical examination at a health care facility       clonazePAM 0.5 MG tablet    klonoPIN    12 tablet    Take 1 tablet (0.5 mg) by mouth 2 times daily as needed for anxiety    Anxiety       glucosamine chondroitin 500 complex Caps     200 capsule    Take 1,500 mg by mouth daily.    Osteoarthritides       latanoprost 0.005 % ophthalmic solution    XALATAN    7.5 mL    Place 1 drop into both eyes At Bedtime    Normal tension glaucoma of both eyes, mild stage       MULTIVITAMINS PO      Take by mouth every morning

## 2018-05-17 NOTE — PATIENT INSTRUCTIONS
Patient will continue on Latanoprost which is a teal top drop at bedtime in both eyes and Simbrinza (Brinzolamide/Brimonidine) which is a white top drop 2x/day (12 hours apart) in the LEFT EYE ONLY.    Patient will return to clinic in 6-9 months with repeat IOP check and visual field test, dilated eye exam and OCT with RNFL analysis.

## 2018-05-17 NOTE — PROGRESS NOTES
1)POAG/LTG vs Glc Suspect -- H/O DH OS, ??H/O Raynaud's per old notes, H/O low BP, anemia in past -- K pachy: 583/589   Tmax:20/19     HVF: OD:Full c fluct and OS:?Nasal step vs fluct (in 11/17)     CDR: 0.7/0.8    HRT/OCT:tr RNFL thinning (?prog OS in 2017)      FHX of Glc: Mother -- had surgery, lost vision     Gonio: open      Intolerant to:      Asthma/COPD: No  Steroid Use:No     Kidney Stones:  No   Sulfa Allergy: No     IOP targets:  2)NS OU    Patient will continue on Latanoprost which is a teal top drop at bedtime in both eyes and Simbrinza (Brinzolamide/Brimonidine) which is a white top drop 2x/day (12 hours apart) in the LEFT EYE ONLY.    Patient will return to clinic in 6-9 months with repeat IOP check and visual field test, dilated eye exam and OCT with RNFL analysis.      Attending Physician Attestation:  Complete documentation of historical and exam elements from today's encounter can be found in the full encounter summary report (not reduplicated in this progress note). I personally obtained the chief complaint(s) and history of present illness.  I confirmed and edited as necessary the review of systems, past medical/surgical history, family history, social history, and examination findings as documented by others; and I examined the patient myself. I personally reviewed the relevant tests, images, and reports as documented above. I formulated and edited as necessary the assessment and plan and discussed the findings and management plan with the patient and family.  - Felicia Abreu MD     Resident Note:  IOP 16/14 on MD recheck. Doing well with with simbrinza. Nasal step left eye previously seen not present on VF today-likely fluctuation. Continue present management.    Chago Pierce MD  Ophthalmology, PGY-3

## 2018-05-18 ENCOUNTER — THERAPY VISIT (OUTPATIENT)
Dept: PHYSICAL THERAPY | Facility: CLINIC | Age: 63
End: 2018-05-18
Payer: COMMERCIAL

## 2018-05-18 DIAGNOSIS — R60.0 LOCALIZED EDEMA: ICD-10-CM

## 2018-05-18 DIAGNOSIS — Z98.890 S/P ORIF (OPEN REDUCTION INTERNAL FIXATION) FRACTURE: ICD-10-CM

## 2018-05-18 DIAGNOSIS — Z87.81 S/P ORIF (OPEN REDUCTION INTERNAL FIXATION) FRACTURE: ICD-10-CM

## 2018-05-18 DIAGNOSIS — S82.892A CLOSED FRACTURE OF LEFT ANKLE, INITIAL ENCOUNTER: ICD-10-CM

## 2018-05-18 PROCEDURE — 97530 THERAPEUTIC ACTIVITIES: CPT | Mod: GP

## 2018-05-18 PROCEDURE — 97112 NEUROMUSCULAR REEDUCATION: CPT | Mod: GP

## 2018-09-14 DIAGNOSIS — H40.1231 NORMAL TENSION GLAUCOMA OF BOTH EYES, MILD STAGE: ICD-10-CM

## 2018-09-14 RX ORDER — LATANOPROST 50 UG/ML
1 SOLUTION/ DROPS OPHTHALMIC AT BEDTIME
Qty: 7.5 ML | Refills: 1 | Status: SHIPPED | OUTPATIENT
Start: 2018-09-14 | End: 2019-08-04

## 2018-09-14 NOTE — TELEPHONE ENCOUNTER
Medication: xalatan      Last Written Prescription Date:  1/2/18  Last Fill Quantity: 7.5   # refills: 1    Last Office Visit: 5/17/18  Future Office visit: no future appt

## 2018-10-01 ENCOUNTER — OFFICE VISIT (OUTPATIENT)
Dept: INTERNAL MEDICINE | Facility: CLINIC | Age: 63
End: 2018-10-01
Payer: COMMERCIAL

## 2018-10-01 VITALS
WEIGHT: 162 LBS | DIASTOLIC BLOOD PRESSURE: 80 MMHG | BODY MASS INDEX: 26.96 KG/M2 | OXYGEN SATURATION: 100 % | SYSTOLIC BLOOD PRESSURE: 132 MMHG | TEMPERATURE: 97.8 F | RESPIRATION RATE: 16 BRPM | HEART RATE: 80 BPM

## 2018-10-01 DIAGNOSIS — Z12.31 VISIT FOR SCREENING MAMMOGRAM: ICD-10-CM

## 2018-10-01 DIAGNOSIS — N28.9 RENAL INSUFFICIENCY: Primary | ICD-10-CM

## 2018-10-01 DIAGNOSIS — F32.A DEPRESSION, UNSPECIFIED DEPRESSION TYPE: ICD-10-CM

## 2018-10-01 DIAGNOSIS — Z23 NEED FOR PROPHYLACTIC VACCINATION AND INOCULATION AGAINST INFLUENZA: ICD-10-CM

## 2018-10-01 LAB
ANION GAP SERPL CALCULATED.3IONS-SCNC: 7 MMOL/L (ref 3–14)
BASOPHILS # BLD AUTO: 0.1 10E9/L (ref 0–0.2)
BASOPHILS NFR BLD AUTO: 0.8 %
BUN SERPL-MCNC: 21 MG/DL (ref 7–30)
CALCIUM SERPL-MCNC: 8.8 MG/DL (ref 8.5–10.1)
CHLORIDE SERPL-SCNC: 106 MMOL/L (ref 94–109)
CHOLEST SERPL-MCNC: 203 MG/DL
CO2 SERPL-SCNC: 28 MMOL/L (ref 20–32)
CREAT SERPL-MCNC: 1.42 MG/DL (ref 0.52–1.04)
DIFFERENTIAL METHOD BLD: NORMAL
EOSINOPHIL # BLD AUTO: 0.1 10E9/L (ref 0–0.7)
EOSINOPHIL NFR BLD AUTO: 0.8 %
ERYTHROCYTE [DISTWIDTH] IN BLOOD BY AUTOMATED COUNT: 13.7 % (ref 10–15)
GFR SERPL CREATININE-BSD FRML MDRD: 37 ML/MIN/1.7M2
GLUCOSE SERPL-MCNC: 91 MG/DL (ref 70–99)
HCT VFR BLD AUTO: 39.9 % (ref 35–47)
HDLC SERPL-MCNC: 78 MG/DL
HGB BLD-MCNC: 12.7 G/DL (ref 11.7–15.7)
IMM GRANULOCYTES # BLD: 0 10E9/L (ref 0–0.4)
IMM GRANULOCYTES NFR BLD: 0.3 %
LDLC SERPL CALC-MCNC: 108 MG/DL
LYMPHOCYTES # BLD AUTO: 1.9 10E9/L (ref 0.8–5.3)
LYMPHOCYTES NFR BLD AUTO: 30.6 %
MCH RBC QN AUTO: 30.7 PG (ref 26.5–33)
MCHC RBC AUTO-ENTMCNC: 31.8 G/DL (ref 31.5–36.5)
MCV RBC AUTO: 96 FL (ref 78–100)
MONOCYTES # BLD AUTO: 0.5 10E9/L (ref 0–1.3)
MONOCYTES NFR BLD AUTO: 7.8 %
NEUTROPHILS # BLD AUTO: 3.7 10E9/L (ref 1.6–8.3)
NEUTROPHILS NFR BLD AUTO: 59.7 %
NONHDLC SERPL-MCNC: 125 MG/DL
NRBC # BLD AUTO: 0 10*3/UL
NRBC BLD AUTO-RTO: 0 /100
PLATELET # BLD AUTO: 282 10E9/L (ref 150–450)
POTASSIUM SERPL-SCNC: 3.8 MMOL/L (ref 3.4–5.3)
RBC # BLD AUTO: 4.14 10E12/L (ref 3.8–5.2)
SODIUM SERPL-SCNC: 140 MMOL/L (ref 133–144)
TRIGL SERPL-MCNC: 84 MG/DL
TSH SERPL DL<=0.005 MIU/L-ACNC: 1.1 MU/L (ref 0.4–4)
WBC # BLD AUTO: 6.2 10E9/L (ref 4–11)

## 2018-10-01 RX ORDER — BUPROPION HYDROCHLORIDE 100 MG/1
100 TABLET, EXTENDED RELEASE ORAL 2 TIMES DAILY
Qty: 200 TABLET | Refills: 3 | Status: SHIPPED | OUTPATIENT
Start: 2018-10-01 | End: 2019-10-15

## 2018-10-01 ASSESSMENT — PAIN SCALES - GENERAL: PAINLEVEL: NO PAIN (0)

## 2018-10-01 NOTE — NURSING NOTE
Chief Complaint   Patient presents with     Physical     Pt is here for annual physical.      Lashae Turk LPN at 2:58 PM on 10/1/2018.

## 2018-10-01 NOTE — PROGRESS NOTES
HPI  83-year-old presents today for physical examination.  She has been doing well but has several concerns.  Over the last 6 months she has had a sensation of some weakness in the dorsiflexion of her right ankle.  When she walks she has to consciously think dorsiflexing the foot so that she does not catch her toe.  She is not had any weakness here she is not had any numbness tingling injury trauma pain discomfort rash or other symptoms.  She has never developed an actual foot drop or weakness with this.  She does not think it significant enough that she would want to pursue investigation for an AFO.  She did have a couple week history of transient dizziness.  The dizziness was described as a sensation of movement.  It was not true spinning but did have a sensation of movement aggravated by scrolling on computer screens.  She did not find that moving her head aggravated or precipitated the symptoms and often she would wake up with the symptoms first thing in the morning.  It resolved spontaneously after 2-3 weeks was not associated with any tinnitus decreased hearing headache visual symptoms or other complaints.  She is been tolerating the Wellbutrin well has not had any side effects or ill effects related to this depression has been under good control and she is asymptomatic in relation to this.  Is requesting a refill.  She is doing some limited physical activity walking 30 minutes twice a week follows a healthy diet avoid salt is high in produce.  Past Medical History:   Diagnosis Date     Closed left ankle fracture 08/19/2017     Depressive disorder, not elsewhere classified     Depression (non-psychotic)     Guillaine Shiro syndrome 2000     Mild recurrent major depression (H) 10/1/2012     Osteoarthritis of carpometacarpal joint of thumb     bilaterally     Primary open angle glaucoma of both eyes, mild stage 12/01/2016     Past Surgical History:   Procedure Laterality Date     ORIF left ankle  08/20/2017      Family History   Problem Relation Age of Onset     Cerebrovascular Disease Mother      Depression Mother      Eye Disorder Mother      macular degeneration     Psychotic Disorder Mother      Respiratory Mother      Glaucoma Mother      Cancer - colorectal Father      Arthritis Father      Cardiovascular Father      HEART DISEASE Father      Thyroid Disease Father      Sleep Apnea Father      Eye Disorder Maternal Grandfather      Glaucoma Maternal Grandfather      Macular Degeneration Maternal Grandfather      Cerebrovascular Disease Paternal Grandmother      Arthritis Paternal Grandmother      Unknown/Adopted Paternal Grandfather      Social History     Social History     Marital status: Unknown     Spouse name: N/A     Number of children: N/A     Years of education: N/A     Social History Main Topics     Smoking status: Never Smoker     Smokeless tobacco: Never Used     Alcohol use 3.0 oz/week     6 Standard drinks or equivalent per week      Comment: 3 per week     Drug use: No     Sexual activity: Yes     Partners: Male     Other Topics Concern     Exercise Yes     run/walk 2-3 x/week     Social History Narrative       Exam:  /80  Pulse 80  Temp 97.8  F (36.6  C) (Oral)  Resp 16  Wt 73.5 kg (162 lb)  SpO2 100%  Breastfeeding? No  BMI 26.96 kg/m2  162 lbs 0 oz  Physical Exam   The patient is alert, oriented with a clear sensorium.   Skin shows no lesions or rashes and good turgor.   Head is normocephalic and atraumatic.   Eyes show PERRLA with benign optic fundi.   Ears show normal TMs bilaterally.   Mouth shows clear oral mucosa.   Neck shows no nodes, thyromegaly or bruits.   Back is non tender.  Lungs are clear to percussion and auscultation.   Heart shows normal S1 and S2 without murmur or gallop.   Abdomen is soft and nontender without masses or organomegaly.   Extremities show no edema and no evidence of active synovitis.   Neurologic examination shows cranial nerves II-XII intact. Motor  shows 5/5 strength. Reflexes are symmetric. Cerebellar testing shows normal tandem gait.  Romberg negative.    ASSESSMENT  1 depression in remission  2 borderline hypertension needs follow-up evaluation  3 probably resolved labyrinthitis  4 no evidence of foot drop right foot  5 Renal insufficiency needs F/U  6 Mild hyperlipidemia    Plan  She needs a mammogram we will recheck her labs today encouraged her to increase the exercise continue the healthy diet monitor the blood pressure at home and reassess in a year    This note was completed using Dragon voice recognition software.  Although reviewed after completion, some word and grammatical errors may occur.    Maxim Marcum MD  General Internal Medicine  Primary Care Center  [phone]

## 2018-10-01 NOTE — MR AVS SNAPSHOT
After Visit Summary   10/1/2018    Poornima Hilton    MRN: [id number]           Patient Information     Date Of Birth          1955        Visit Information        Provider Department      10/1/2018 3:00 PM Maxim Marcum MD [facility]        Today's Diagnoses     Visit for screening mammogram        Need for prophylactic vaccination and inoculation against influenza        Depression, unspecified depression type          Care Instructions    Primary Care Center Medication Refill Request Information:  * Please contact your pharmacy regarding ANY request for medication refills.  ** [facility] Prescription Fax = [phone]  * Please allow 3 business days for routine medication refills.  * Please allow 5 business days for controlled substance medication refills.     Primary Care Center Test Result notification information:  *You will be notified with in 7-10 days of your appointment day regarding the results of your test.  If you are on MyChart you will be notified as soon as the provider has reviewed the results and signed off on them.    Primary Care Center: [phone]     Breast Center [facility]) [phone] (2nd Floor [facility] [place] [facility])   Breast Center ([facility]) [phone] ([address])     Mammogram Screening Tool    Mammogram   Does patient have a history of breast cancer? no  Does patient have breast implants? no  Reason for mammogram? Routine            Follow-ups after your visit        Follow-up notes from your care team     Return in about 1 year (around 10/1/2019).      Your next 10 appointments already scheduled     Oct 01, 2018  4:30 PM CDT   LAB with  LAB    Health Lab ([facility])    [address] [place] [address]  1st Floor  [facility] [address] [phone]           Please do not eat 10-12 hours before your appointment if you are coming in fasting for labs on lipids, cholesterol, or glucose (sugar). This  does not apply to pregnant women. Water, hot tea and black coffee (with nothing added) are okay. Do not drink other fluids, diet soda or chew gum.            Oct 05, 2018 10:15 AM CDT   MA SCREENING DIGITAL BILATERAL with [id number]   [facility] ([facility])    [address] [facility], 2nd Floor  [facility] [place] [id number] [phone]           How do I prepare for my exam? (Food and drink instructions) No Food and Drink Restrictions.  How do I prepare for my exam? (Other instructions) Do not use any powder, lotion or deodorant under your arms or on your breast. If you do, we will ask you to remove it before your exam.  What should I wear: Wear comfortable, two-piece clothing.  How long does the exam take: Most scans will take 15 minutes.  What should I bring: Bring any previous mammograms from other facilities or have them mailed to the breast center.  Do I need a :  No  is needed.  What do I need to tell my doctor: If you have any allergies, tell your care team.  What should I do after the exam: No restrictions, You may resume normal activities.  What is this test: This test is an x-ray of the breast to look for breast disease. The breast is pressed between two plates to flatten and spread the tissue. An X-ray is taken of the breast from different angles.  Who should I call with questions: If you have any questions, please call the Imaging Department where you will have your exam. Directions, parking instructions, and other information is available on our website, [email or web address].  Other information about my exam Three-dimensional (3D) mammograms are available at [facility] locations in [facility], [facility], [facility], [place], [facility] and [place]. [facility] locations include [place] and the [facility] in [place].  Benefits of 3D mammograms include * Improved rate of cancer detection * Decreases your chance  "of having to go back for more tests, which means fewer: * \"False-positive\" results (This means that there is an abnormal area but it isn't cancer.) * Invasive testing procedures, such as a biopsy or surgery * Can provide clearer images of the breast if you have dense breast tissue.  *3D mammography is an optional exam that anyone can have with a 2D mammogram. It doesn't replace or take the place of a 2D mammogram. 2D mammograms remain an effective screening test for all women.  Not all insurance companies cover the cost of a 3D mammogram. Check with your insurance. Three-dimensional (3D) mammograms are available at [facility] locations in [facility], [facility], and [place]. [facility] locations include [place] and [facility] in [place]. Benefits of 3D mammograms include: - Improved rate of cancer detection - Decreases your chance of having to go back for more tests, which means fewer: - \"False-positive\" results (This means that there is an abnormal area but it isn't cancer.) - Invasive testing procedures, such as a biopsy or surgery - Can provide clearer images of the breast if you have dense breast tissue. 3D mammography is an optional exam that anyone can have with a 2D mammogram. It doesn't replace or take the place of a 2D mammogram. 2D mammograms remain an effective screening test for all women.  Not all insurance companies cover the cost of a 3D mammogram. Check with your insurance.              Future tests that were ordered for you today     Open Future Orders        Priority Expected Expires Ordered    MA SCREENING DIGITAL BILAT - Future  (s+30) Routine  10/1/2019 10/1/2018    CBC with platelets differential Routine  10/1/2019 10/1/2018    Basic metabolic panel Routine  10/1/2019 10/1/2018    Lipid Profile Routine  10/1/2019 10/1/2018    TSH with free T4 reflex Routine  10/1/2019 10/1/2018            Who to contact     Please call your " clinic at [phone] to:    Ask questions about your health    Make or cancel appointments    Discuss your medicines    Learn about your test results    Speak to your doctor            Additional Information About Your Visit        [employer] Information     [employer] gives you secure access to your electronic health record. If you see a primary care provider, you can also send messages to your care team and make appointments. If you have questions, please call your primary care clinic.  If you do not have a primary care provider, please call [phone] and they will assist you.      [employer] is an electronic gateway that provides easy, online access to your medical records. With [employer], you can request a clinic appointment, read your test results, renew a prescription or communicate with your care team.     To access your existing account, please contact your [facility] or call [phone] for assistance.        Care EveryWhere ID     This is your Care EveryWhere ID. This could be used by other organizations to access your [facility] medical records  [id number]        Your Vitals Were     Pulse Temperature Respirations Pulse Oximetry Breastfeeding? BMI (Body Mass Index)    80 97.8  F (36.6  C) (Oral) 16 100% No 26.96 kg/m2       Blood Pressure from Last 3 Encounters:   10/01/18 132/80   01/03/18 124/84   09/22/17 111/76    Weight from Last 3 Encounters:   10/01/18 73.5 kg (162 lb)   01/03/18 68.5 kg (151 lb)   08/22/17 68 kg (150 lb)              We Performed the Following     HC FLU VAC PRESRV FREE QUAD SPLIT VIR 3+YRS IM          Today's Medication Changes          These changes are accurate as of 10/1/18  4:10 PM.  If you have any questions, ask your nurse or doctor.               These medicines have changed or have updated prescriptions.        Dose/Directions    glucosamine chondroitin 500 complex Caps   This may have changed:  when to take this   Used for:  Osteoarthritides         Dose:  1500 mg   Take 1,500 mg by mouth daily.   Quantity:  200 capsule   Refills:  99            Where to get your medicines      These medications were sent to [id number] - [place], [place] - [address] [facility] [address]  [address], [place] [address]     Phone:  [phone]     buPROPion 100 MG 12 hr tablet                Primary Care Provider Office Phone # Fax #    Maxim Anselmo Marcum [phone]       [address] [place] [address]        Equal Access to Services     [facility] : Hadii aad ku hadasho Soomaali, waaxda luqadaha, qaybta kaalmada adeegyada, rehan waldrop hayraymon madera . So [facility] [phone].    ATENCIÓN: Si habla español, tiene a figueredo disposición servicios gratuitos de asistencia lingüística. [facility] [phone].    We comply with applicable federal civil rights laws and [place] laws. We do not discriminate on the basis of race, color, national origin, age, disability, sex, sexual orientation, or gender identity.            Thank you!     Thank you for choosing [facility]  for your care. Our goal is always to provide you with excellent care. Hearing back from our patients is one way we can continue to improve our services. Please take a few minutes to complete the written survey that you may receive in the mail after your visit with us. Thank you!             Your Updated Medication List - Protect others around you: Learn how to safely use, store and throw away your medicines at www.disposemymeds.org.          This list is accurate as of 10/1/18  4:10 PM.  Always use your most recent med list.                   Brand Name Dispense Instructions for use Diagnosis    aspirin 81 MG tablet      Take 1 tablet by mouth every morning    Routine general medical examination at a health care facility       brinzolamide-brimonidine 1-0.2 % ophthalmic suspension    SIMBRINZA     8 mL    Place 1 drop Into the left eye 3 times daily    Primary open angle glaucoma of both eyes, moderate stage       buPROPion 100 MG 12 hr tablet    WELLBUTRIN SR    200 tablet    Take 1 tablet (100 mg) by mouth 2 times daily    Depression, unspecified depression type       calcium 600 MG tablet      Take 1 tablet by mouth 2 times daily.    Routine general medical examination at a health care facility       clonazePAM 0.5 MG tablet    klonoPIN    12 tablet    Take 1 tablet (0.5 mg) by mouth 2 times daily as needed for anxiety    Anxiety       glucosamine chondroitin 500 complex Caps     200 capsule    Take 1,500 mg by mouth daily.    Osteoarthritides       latanoprost 0.005 % ophthalmic solution    XALATAN    7.5 mL    Place 1 drop into both eyes At Bedtime    Normal tension glaucoma of both eyes, mild stage       MULTIVITAMINS PO      Take by mouth every morning

## 2018-10-01 NOTE — PATIENT INSTRUCTIONS
Primary Care Center Medication Refill Request Information:  * Please contact your pharmacy regarding ANY request for medication refills.  ** [facility] Prescription Fax = [phone]  * Please allow 3 business days for routine medication refills.  * Please allow 5 business days for controlled substance medication refills.     Primary Care Center Test Result notification information:  *You will be notified with in 7-10 days of your appointment day regarding the results of your test.  If you are on MyChart you will be notified as soon as the provider has reviewed the results and signed off on them.    Primary Care Center: [phone]     Breast Center ([facility]) [phone] (2nd Floor [facility])   Breast Center ([facility]) [phone] ([address])     Mammogram Screening Tool    Mammogram   Does patient have a history of breast cancer? no  Does patient have breast implants? no  Reason for mammogram? Routine

## 2018-10-01 NOTE — NURSING NOTE
Poornima Hilton      1.  Has the patient received the information for the influenza vaccine? YES    2.  Does the patient have any of the following contraindications?     Allergy to eggs? No     Allergic reaction to previous influenza vaccines? No     Any other problems to previous influenza vaccines? No     Paralyzed by Guillain-Kenai syndrome? No     Currently pregnant? NO     Current moderate or severe illness? No     Allergy to contact lens solution? No    3.  The vaccine has been administered in the usual fashion and the patient was instructed to wait 20 minutes before leaving the building in the event of an allergic reaction: YES    Vaccination given by Lashae Turk LPN at 3:01 PM on 10/1/2018.  .  Recorded by Lashae Turk

## 2018-10-03 ENCOUNTER — MYC MEDICAL ADVICE (OUTPATIENT)
Dept: INTERNAL MEDICINE | Facility: CLINIC | Age: 63
End: 2018-10-03

## 2018-10-05 ENCOUNTER — RADIANT APPOINTMENT (OUTPATIENT)
Dept: MAMMOGRAPHY | Facility: CLINIC | Age: 63
End: 2018-10-05
Attending: INTERNAL MEDICINE
Payer: COMMERCIAL

## 2018-10-05 DIAGNOSIS — Z12.31 VISIT FOR SCREENING MAMMOGRAM: ICD-10-CM

## 2018-11-02 DIAGNOSIS — N28.9 RENAL INSUFFICIENCY: ICD-10-CM

## 2018-11-02 LAB
ANION GAP SERPL CALCULATED.3IONS-SCNC: 9 MMOL/L (ref 3–14)
BUN SERPL-MCNC: 16 MG/DL (ref 7–30)
CALCIUM SERPL-MCNC: 9.1 MG/DL (ref 8.5–10.1)
CHLORIDE SERPL-SCNC: 102 MMOL/L (ref 94–109)
CO2 SERPL-SCNC: 28 MMOL/L (ref 20–32)
CREAT SERPL-MCNC: 1.02 MG/DL (ref 0.52–1.04)
GFR SERPL CREATININE-BSD FRML MDRD: 54 ML/MIN/1.7M2
GLUCOSE SERPL-MCNC: 96 MG/DL (ref 70–99)
POTASSIUM SERPL-SCNC: 3.8 MMOL/L (ref 3.4–5.3)
SODIUM SERPL-SCNC: 139 MMOL/L (ref 133–144)

## 2018-11-29 DIAGNOSIS — H40.1132 PRIMARY OPEN ANGLE GLAUCOMA OF BOTH EYES, MODERATE STAGE: ICD-10-CM

## 2018-11-29 NOTE — TELEPHONE ENCOUNTER
Medication: SIMBRINZA 1-0.2 % ophthalmic suspension:    Requested directions: 1 drop Into the left eye 3 times daily  Current directions on the medication list: Place 1 drop Into the left eye 3 times daily - Left Eye    Last Written Prescription Date:  11-2-17  Last Fill Quantity: 8 ml,   # refills: 11    Last Office Visit: 5-17-18  Future Office visit: none    Attending Provider: Brady  Last Clinic Note: Simbrinza (Brinzolamide/Brimonidine) which is a white top drop 2x/day (12 hours apart) in the LEFT EYE ONLY.    Diagnosis:Primary open angle glaucoma of both eyes, moderate stage   Routing refill request to provider for review/approval because:  Inconsistent dose/directions

## 2019-04-02 ENCOUNTER — OFFICE VISIT (OUTPATIENT)
Dept: INTERNAL MEDICINE | Facility: CLINIC | Age: 64
End: 2019-04-02
Payer: COMMERCIAL

## 2019-04-02 VITALS — RESPIRATION RATE: 16 BRPM | SYSTOLIC BLOOD PRESSURE: 133 MMHG | HEART RATE: 91 BPM | DIASTOLIC BLOOD PRESSURE: 84 MMHG

## 2019-04-02 DIAGNOSIS — R42 VERTIGO: Primary | ICD-10-CM

## 2019-04-02 DIAGNOSIS — R29.2 HYPERREFLEXIA: ICD-10-CM

## 2019-04-02 DIAGNOSIS — R29.898 WEAKNESS OF FOOT, RIGHT: ICD-10-CM

## 2019-04-02 ASSESSMENT — PATIENT HEALTH QUESTIONNAIRE - PHQ9: SUM OF ALL RESPONSES TO PHQ QUESTIONS 1-9: 2

## 2019-04-02 ASSESSMENT — PAIN SCALES - GENERAL: PAINLEVEL: NO PAIN (0)

## 2019-04-02 NOTE — PATIENT INSTRUCTIONS
Primary Care Center Phone Number [phone]  Primary Care Center Medication Refill Request Information:  * Please contact your pharmacy regarding ANY request for medication refills.  ** [facility] Prescription Fax = [phone]  * Please allow 3 business days for routine medication refills.  * Please allow 5 business days for controlled substance medication refills.     Primary Care Center Test Result notification information:  *You will be notified with in 7-10 days of your appointment day regarding the results of your test.  If you are on MyChart you will be notified as soon as the provider has reviewed the results and signed off on them.               [facility]         Internal Medicine Resident                   Continuity Clinic    Who We Are    Resident Continuity Clinic is a part of the [facility].  Resident physicians see patients independently and establish a relationship with them over the course of their three-year residency program.  As with the Primary Care Clinic, our Resident Continuity Clinic models a group practice.  If your doctor is not available, you will be able to see another resident physician.  At the end of a resident s training, patients will be transitioned to a new resident physician for ongoing care.     We treat patients with a wide array of medical needs from routine physicals, to acute illnesses, to diabetes and blood pressure management, to complex medical illness.  What is a Resident Physician?    Resident physicians hold medical degrees and are doctors. They are training to become specialists in Internal Medicine. They work under the supervision of board-certified faculty physicians.  Expectations for Your Care    We strive to provide accessible, quality care at all times.    In order to provide this care, it is best to see your primary care resident doctor consistently rather switching between providers.  In the event you do see another physician, you  should schedule a follow-up visit with your usual primary care doctor.    If you are transitioning your care from another clinic, it is helpful to have your records available for your doctor to review.    We do not prescribe controlled substances, such as ADD medications or narcotic pain medications, on your first visit.  We will review your health records and concerns prior to devising a treatment plan with you in order to provide the best care.      Clinic Services     Extended clinic hours; patient  to help navigate your visit;  parking; laboratory and imaging services with evening and weekend hours    Multiple medical and surgical specialties in one building    Complementary services, including Nutrition, Integrative Medicine, Pharmacy consultations, Mental and Behavioral Health, Sports Medicine and Physical Therapy    Thank You    We would like to thank you for choosing the [facility] Resident Continuity Clinic for your primary care. You are making a priceless contribution to the training of the next generation of health care practitioners.     Contact us at [phone] for appointments or questions.    Resident Clinic Hours are Tuesdays and Thursdays, 7:30am-5:00pm    Residents   Dipesh Carlin MD  (Male)   Jigna Titus MD (Female)  Mimi Atkinson MD   (Female)   Maryann Maher MD   (Female)   Louis Rodarte MD  (Male)   Joni Ingram MD  (Male)   Jesus Manuel Minor MD    (Female)   Eben Disla MD  (Male)   Edu Arreola MD  (Male)    Tara Giraldo MD  (Female)   Boris Win MD  (Male)   Bradley Marie MD  (Female)   Suman Edmonds MD   (Female)   Negro Martel MD  (Male)   Imtiaz Phillips MD  (Male)   Joni Chavez MD (Male)   Manny Ledezma MD  (Male)   Zayra Bonilla MD (Female)    Nona Brewer MD (Female)   Luna Isaacs MD  (Male)   Dayna Leong MD    (Female)   Karime Cali MD  (Female)    Supervising Physicians    Kandy Dempsey, MD Temitope Mathew, MD Malick Wu, MD Maxim Marcum, MD Yaneth Lutz, MD Leola Fair, MD David Malcolm, MD Sandra Waite, MD Christa No MD

## 2019-04-02 NOTE — NURSING NOTE
Chief Complaint   Patient presents with     Dizziness     pt is here to discuss ongoing dizziness      Foot Problems     pt is here to discuss right foot issue x 1 year       Mimi Vargas CMA at 8:09 AM on 4/2/2019

## 2019-04-03 NOTE — PROGRESS NOTES
"                     [facility]       SUBJECTIVE:  Poornima Hilton is a 64 year old female with a PMHx of depression who comes in for dizziness.    Last September for 2 weeks started having dizziness that was so bad that she had difficulty walking and  needed to hold onto the wall and to walk. This episode went away without intervention. Came back in October and this time it was worse. Seen by Dr. Marcum resolved in 2-3 weeks. Went away, Started March 10th and then went away, Has tried drinking more water and did not help. DIzziness is constant. When she is lying in bed she doesn't notice it. Last November would come even when she is lying in bed. Some days it is worse than others, sometimes worse. She feels like her \"brain is sea sick\". Denies distortion in vision.  Has limited her from doing activities she wants to do.     When she walks her right leg seems to have trip on foot happens with use and longer walks. Used to hike a lot and now with long will have to hold onto 's leg. Has gone on for 1 yr and has stayed the same. Went to PT with no benefit.  Has had peripheral neuropathy many years ago and had numbness in lower extremities. Had extensive testing at the time which was normal.     Has been on wellbutrin for estimated 7+ years and denies any new medications.    Has very mild cold which she describes as blowing her nose occasionally.     Medications and allergies reviewed by me today.     ROS:   Constitutional, HEENT, cardiovascular, pulmonary, gi and gu systems are negative, except as otherwise noted.    OBJECTIVE:    /84   Pulse 91   Resp 16    Wt Readings from Last 1 Encounters:   10/01/18 73.5 kg (162 lb)       GENERAL APPEARANCE: healthy, alert and no distress     EYES: EOMI, PERRL     HENT: ear canals and TM's normal and nose and mouth without ulcers or lesions     RESP: lungs clear to auscultation - no rales, rhonchi or wheezes     CV: regular rates and rhythm, normal S1 S2, no " S3 or S4 and no murmur, click or rub     ABDOMEN:  soft, nontender, no HSM or masses and bowel sounds normal     MS: extremities normal- no gross deformities noted, no evidence of inflammation in joints, FROM in all extremities.     SKIN: no suspicious lesions or rashes     NEURO: Normal strength and tone, sensory exam grossly normal, mentation intact and speech normal     NEURO: overall gait normal, instability heel/toe tandem walking and Romberg abnormal, hyperreflexia RLE, negative babinski and wilson tests, 1 beat of clonus RLE, no evidence of foot drop on right     PSYCH: mentation appears normal. and affect normal/bright     ASSESSMENT/PLAN:    Poornima was seen today for dizziness and foot problems.      Diagnoses and all orders for this visit:    Vertigo  -     PHYSICAL THERAPY REFERRAL; Future  -     MRI Brain w & w/o contrast; Future    Hyperreflexia  Weakness of foot, right  -     NEUROLOGY ADULT REFERRAL  -     MRI Brain w & w/o contrast; Future    Suman Edmonds MD  Internal Medicine, PGY-1  Apr 2, 2019    Pt was seen and plan of care discussed with Dr. No.     Pt was seen and examined with Dr. Edmonds.  I agree with her documentation as noted above.    My additional comments:   DDx recurrent multi-week vertigo without hearing changes includes CNS disease.  Doubt acoustic neuroma.    Chronic right foot dorsiflexion weakness, nonprogressive, and hyperreflexia on KJ and AJ without UMN findins otherwise, may relate to vertigo.    I called pt to ask about MRI, and she has claustrophobia.  Neurology visit scheduled for 3/8/19.  Will await their recommendations before scheduling.    Christa No MD

## 2019-04-04 ENCOUNTER — OFFICE VISIT (OUTPATIENT)
Dept: OPHTHALMOLOGY | Facility: CLINIC | Age: 64
End: 2019-04-04
Attending: OPHTHALMOLOGY
Payer: COMMERCIAL

## 2019-04-04 DIAGNOSIS — H25.13 NUCLEAR SENILE CATARACT OF BOTH EYES: ICD-10-CM

## 2019-04-04 DIAGNOSIS — H40.1132 PRIMARY OPEN ANGLE GLAUCOMA OF BOTH EYES, MODERATE STAGE: Primary | ICD-10-CM

## 2019-04-04 DIAGNOSIS — H40.1131 PRIMARY OPEN ANGLE GLAUCOMA OF BOTH EYES, MILD STAGE: ICD-10-CM

## 2019-04-04 PROCEDURE — 92083 EXTENDED VISUAL FIELD XM: CPT | Mod: ZF | Performed by: OPHTHALMOLOGY

## 2019-04-04 PROCEDURE — 92015 DETERMINE REFRACTIVE STATE: CPT | Mod: ZF

## 2019-04-04 PROCEDURE — 92133 CPTRZD OPH DX IMG PST SGM ON: CPT | Mod: ZF | Performed by: OPHTHALMOLOGY

## 2019-04-04 PROCEDURE — G0463 HOSPITAL OUTPT CLINIC VISIT: HCPCS | Mod: ZF

## 2019-04-04 ASSESSMENT — REFRACTION_WEARINGRX
OS_AXIS: 150
OS_CYLINDER: +1.75
OS_ADD: +2.50
OD_SPHERE: -1.25
OS_SPHERE: -0.75
OD_AXIS: 048
OD_ADD: +2.50
OD_CYLINDER: +1.25

## 2019-04-04 ASSESSMENT — SLIT LAMP EXAM - LIDS
COMMENTS: NORMAL
COMMENTS: NORMAL

## 2019-04-04 ASSESSMENT — TONOMETRY
IOP_METHOD: APPLANATION
OD_IOP_MMHG: 13
OS_IOP_MMHG: 13

## 2019-04-04 ASSESSMENT — CUP TO DISC RATIO
OD_RATIO: 0.7
OS_RATIO: 0.8

## 2019-04-04 ASSESSMENT — VISUAL ACUITY
METHOD: SNELLEN - LINEAR
CORRECTION_TYPE: GLASSES
OS_CC: 20/30
OD_CC: 20/30
OD_CC+: -2
OS_CC+: +2

## 2019-04-04 ASSESSMENT — EXTERNAL EXAM - LEFT EYE: OS_EXAM: NORMAL

## 2019-04-04 ASSESSMENT — REFRACTION_MANIFEST
OD_AXIS: 048
OS_CYLINDER: +1.50
OS_SPHERE: -1.00
OD_SPHERE: -2.00
OS_ADD: +2.50
OS_AXIS: 153
OD_ADD: +2.50
OD_CYLINDER: +1.75

## 2019-04-04 ASSESSMENT — CONF VISUAL FIELD
OS_NORMAL: 1
OD_NORMAL: 1

## 2019-04-04 ASSESSMENT — EXTERNAL EXAM - RIGHT EYE: OD_EXAM: NORMAL

## 2019-04-04 NOTE — PROGRESS NOTES
1)POAG/LTG vs Glc Suspect -- H/O DH OS, ??H/O Raynaud's per old notes, H/O low BP, anemia in past -- K pachy: 583/589   Tmax:20/19     HVF: OD:Full c fluct and OS:?early sup arcuate      CDR: 0.7/0.8    HRT/OCT:OD:tr RNFL thinning (fairly stable from 1363-4126) and OS:Mild RNFL thinning (prog from 0560-3029)      FHX of Glc: Mother -- had surgery, lost vision     Gonio: open      Intolerant to:      Asthma/COPD: No  Steroid Use:No     Kidney Stones:  No   Sulfa Allergy: No     IOP targets:  2)NS OU  3)H/O recurrent vertigo -- eval with neurologist in near future    MD:Started Simbrinza in 2018    Patient will continue on Latanoprost which is a teal top drop at bedtime in both eyes and Simbrinza (Brinzolamide/Brimonidine) which is a white top drop 2x/day (12 hours apart) in the LEFT EYE ONLY.    Patient will return to clinic in 4-6 months with repeat IOP check and visual field test (LEFT EYE ONLY).      Resident Note:  POAG/LTG vs Glc Suspect   Patient reports gradual blurry vision this past year, also with recent dizziness - seeing neurology next week   Meds: latanoprost both eyes, simbrinza left eye only      IOP 13/13  retinal nerve fiber layer right eye temporal thin - no interval change since starting drops; left eye with temporal thinning - no change since starting drops  visual field 24-2 right eye scatter, left eye early nasal step vs fluctuation   Nasal step left eye corresponds to thinning on retinal nerve fiber layer that is worse since 2016   Consider additional ocular hypotensive vs Selected laser trabeculoplasty (SLT) left eye     Continue latanoprost at bedtime both eyes   Continue simbrinza twice a day left eye     Return to clinic: 6 months repeat 24-2 left eye     Eric Cullen MD  Ophthalmology Resident, PGY-3  [facility]        Attending Physician Attestation:  Complete documentation of historical and exam elements from today's encounter can be found in the full encounter  summary report (not reduplicated in this progress note). I personally obtained the chief complaint(s) and history of present illness.  I confirmed and edited as necessary the review of systems, past medical/surgical history, family history, social history, and examination findings as documented by others; and I examined the patient myself. I personally reviewed the relevant tests, images, and reports as documented above. I formulated and edited as necessary the assessment and plan and discussed the findings and management plan with the patient and family.  - Felicia Abreu MD

## 2019-04-04 NOTE — NURSING NOTE
Chief Complaints and History of Present Illnesses   Patient presents with     Glaucoma Follow-Up     Chief Complaint(s) and History of Present Illness(es)     Glaucoma Follow-Up     Laterality: both eyes              Comments     Pt. States that she has been very dizzy over the past few months.   Is seeing a neurologist on Monday.  Does not seem to be related to eyesight.  No pain BE.  Augustina Patel COT 8:57 AM April 4, 2019

## 2019-04-08 ENCOUNTER — OFFICE VISIT (OUTPATIENT)
Dept: NEUROLOGY | Facility: CLINIC | Age: 64
End: 2019-04-08
Payer: COMMERCIAL

## 2019-04-08 VITALS
HEIGHT: 65 IN | HEART RATE: 85 BPM | BODY MASS INDEX: 26.82 KG/M2 | WEIGHT: 161 LBS | SYSTOLIC BLOOD PRESSURE: 138 MMHG | OXYGEN SATURATION: 99 % | DIASTOLIC BLOOD PRESSURE: 75 MMHG

## 2019-04-08 DIAGNOSIS — F40.240 CLAUSTROPHOBIA: ICD-10-CM

## 2019-04-08 DIAGNOSIS — R42 VERTIGO: ICD-10-CM

## 2019-04-08 DIAGNOSIS — R29.898 TRANSIENT RIGHT LEG WEAKNESS: ICD-10-CM

## 2019-04-08 DIAGNOSIS — R26.9 GAIT DIFFICULTY: Primary | ICD-10-CM

## 2019-04-08 RX ORDER — DIAZEPAM 5 MG
TABLET ORAL
Qty: 1 TABLET | Refills: 0 | Status: ON HOLD | OUTPATIENT
Start: 2019-04-08 | End: 2019-05-16

## 2019-04-08 ASSESSMENT — ENCOUNTER SYMPTOMS
PARALYSIS: 0
HYPERTENSION: 0
NUMBNESS: 0
MUSCLE WEAKNESS: 1
SEIZURES: 0
DECREASED CONCENTRATION: 0
LOSS OF CONSCIOUSNESS: 0
ORTHOPNEA: 0
WEAKNESS: 1
DISTURBANCES IN COORDINATION: 1
INSOMNIA: 1
DEPRESSION: 1
STIFFNESS: 0
LIGHT-HEADEDNESS: 1
DIZZINESS: 1
TREMORS: 0
PALPITATIONS: 0
EXERCISE INTOLERANCE: 0
SYNCOPE: 0
SLEEP DISTURBANCES DUE TO BREATHING: 0
SPEECH CHANGE: 0
NERVOUS/ANXIOUS: 0
JOINT SWELLING: 0
PANIC: 0
BACK PAIN: 0
TINGLING: 1
MYALGIAS: 0
MUSCLE CRAMPS: 0
HEADACHES: 1
ARTHRALGIAS: 0
HYPOTENSION: 0
MEMORY LOSS: 0
LEG PAIN: 0
NECK PAIN: 0

## 2019-04-08 ASSESSMENT — MIFFLIN-ST. JEOR: SCORE: 1276.17

## 2019-04-08 ASSESSMENT — PAIN SCALES - GENERAL: PAINLEVEL: NO PAIN (1)

## 2019-04-08 NOTE — LETTER
"4/8/2019       RE: Poornima Hilton  [address] [place] [address]     Dear Colleague,    Thank you for referring your patient, Poornima Hilton, to the [facility] NEUROLOGY at [facility]. Please see a copy of my visit note below.    Re: Poornima Hilton      MRN# [id number]  YOB: 1955  Date of Visit: 4/8/2019    OUTPATIENT NEUROLOGY VISIT NOTE    Chief Complaint:  right LE weakness/stumbling at times and lack of coordination in the right LE.    History of Present Illness  Poornima Hilton is a 64-year-old right-handed presents to the clinic today for right LE weakness/stumbling at times and lack of coordination in the right LE. No falls reported. Left ankle fracture accidental while camping.   Patient saw PCP on 4/2/2019 for dizziness, right leg tripping. History of low pressure glaucoma, \" Reports a history of 3 \"GBS\" like symptoms in 2000, 2008 and 2014.\"  Per previous Neurology note from 3/26/2014.   The second episode of numbness and tingling sensation in her hands was in 2008.  She was seen at the [facility] for the above-mentioned symptoms. Reported normal EMG. Her outside records from [facility] were available for review. Apparently, the patient had a normal brain MRI and cervical MRI in 2008.  Her imaging evaluation was for demyelinating disease, and there was no evidence of any demyelinating.  Essentially normal contrast brain and cervical spine MRI.  Her EMG report was on 02/05/2008.  The electrophysiological studies were normal with no evidence of polyneuropathy or left cervical radiculopathy on this study.  She describes that her symptoms have a symmetrical pattern.    Her second episode of paresthesia lasted for 1-2 months without any further progression and resolved.      Patient reports that she she feels like to stumble with her right toes/foot and tends to \"stumble\". Slow progressing onset a year ago and was doing PT for left ankle " "fracture around a year ago. Patient reports that some weeks her right leg symptoms are worse some weeks than others. Patient reports that she used to hike a lot but now her right foot/right leg \"challenging.\" Patient reports that her right leg does not appear to be as strong. Patient reports that she does not feel coordinated enough and cannot go fast with the right leg. Patient reports that her right LE weakness and loss of coordination \"affects her life.\"   Patient reports that starting last fall (2018) she fell dizzy for 2 weeks and resolved but re-occurred. Patient reports that dizziness initially lasted for 2 weeks then resolved and then 2-3 weeks in October of 2018 and resolved. Patient reports that it re-occurred March 10th 2019 and resolved. Patient reports that she feels \"dizzy\" and \"off balance\" feeling. Reports that laying in the bed -not noticeable in March but was in the fall of 2018. Associated symptoms-none. Reports that in the fall dizziness would worse with tilting to the left but was not currently. Patient reports that during her dizziness she has been having on and off generalized headache and moderate and still can go \"about\" with her work.   No neck pain.   Left and right 4-5 fingers tingling bilaterally for 19 years and still there as oppose to a whole hand initially. Patient reports that tingling is still there daily in the hands for about 19 years. No symptoms in the feet.   \"History of relapsing-remitting paresthesias developing in an ascending fashion, from the toes to about the mid-calves and the hands, and gradually resolving without medical intervention, since year 2000. Each episode lasts from 1 to 4-5 months. The third episode began a week ago and patient s symptoms are limited to the toes and digits 4-5 of both hands. Query neuropathy. Previous EMG done at the [facility] was normal\"  EMG in 2014 normal  SSEP upper and lower extremities and were reported as normal. "   .  Denies history of head or neck trauma, dizziness, vertigo, loss of consciousness, seizure, double vision, blurred vision, hearing difficulty, speech or swallowing difficulty, urinary or bowel incontinence, coordination problems or gait difficulty, fever or chills.    Neurodiagnostic Testing  CT head -none  MRI brain and cervical MRI presumed normal outside images in 2008  Labs reviewed  KD, Complement, Cryoglobulin, Immunofixation, RF, Sjogren's were all normal  Results for JULIUS CASTELLANOS (MRN [id number]) as of 4/8/2019 10:19   Ref. Range 10/1/2018 16:35   TSH Latest Ref Range: 0.40 - 4.00 mU/L 1.10     Results for JULIUS CASTELLANOS (MRN [id number]) as of 4/8/2019 10:19   Ref. Range 3/26/2014 12:00   Vitamin B12 Latest Ref Range: >210 pg/mL 930     Results for JULIUS CASTELLANOS (MRN [id number]) as of 4/8/2019 10:19   Ref. Range 3/26/2014 12:00   Folate Latest Ref Range: >5.4 ng/mL >24.0...     Past Medical History reviewed and verified with the patient  Past Medical History:   Diagnosis Date     Closed left ankle fracture 08/19/2017     Depressive disorder, not elsewhere classified     Depression (non-psychotic)     Guillaine Uvalde syndrome 2000     Mild recurrent major depression (H) 10/1/2012     Osteoarthritis of carpometacarpal joint of thumb     bilaterally     Primary open angle glaucoma of both eyes, mild stage 12/01/2016       Past Surgical History reviewed and verified with the patient  Past Surgical History:   Procedure Laterality Date     ORIF left ankle  08/20/2017       Family History reviewed and verified with the patient  No neurological history    Social History:   of 41 years, one child, will be retiring this month and works in [employer]  Social History     Tobacco Use     Smoking status: Never Smoker     Smokeless tobacco: Never Used   Substance Use Topics     Alcohol use: Yes     Alcohol/week: 3.0 oz     Types: 6 Standard drinks or equivalent per week     Comment: 3 per week    reviewed and verified  "with the patient   No Known Allergies    Current Outpatient Medications   Medication Sig Dispense Refill     aspirin 81 MG tablet Take 1 tablet by mouth every morning        brinzolamide-brimonidine (SIMBRINZA) 1-0.2 % ophthalmic suspension Place 1 drop Into the left eye 2 times daily 8 mL 11     buPROPion (WELLBUTRIN SR) 100 MG 12 hr tablet Take 1 tablet (100 mg) by mouth 2 times daily 200 tablet 3     Calcium 600 MG tablet Take 1 tablet by mouth 2 times daily.       Glucosamine-Chondroit-Vit C-Mn (GLUCOSAMINE CHONDR 500 COMPLEX) CAPS Take 1,500 mg by mouth daily. (Patient taking differently: Take 1,500 mg by mouth 2 times daily ) 200 capsule 99     latanoprost (XALATAN) 0.005 % ophthalmic solution Place 1 drop into both eyes At Bedtime 7.5 mL 1     Multiple Vitamin (MULTIVITAMINS PO) Take by mouth every morning      reviewed and verified with the patient    Review of Systems:  A 12-point ROS including constitutional, eyes, ENT, respiratory, cardiovascular, gastroenterology, genitourinary, integumentary, musculoskeletal, neurology, hematology and psychiatric were all reviewed with the patient and completed at the [facility] and as mentioned in the HPI.   General Exam:   /75 (BP Location: Left arm, Patient Position: Sitting, Cuff Size: Adult Regular)   Pulse 85   Ht 1.651 m (5' 5\")   Wt 73 kg (161 lb)   SpO2 99%   BMI 26.79 kg/m     GEN: Awake, NAD; good eye contact, responses appropriately, no pain today but a mild headache but nothing concerning   HEENT: Head atraumatic/Normocephalic. Scalp normal. Pupils equally round, 4 mm, reactive to light and accommodation, sclera and conjunctiva normal. Fundoscopic examination reveals normal vessels no papilledema.   Neck: Easily moveable without resistance  Heart: S1/S2 appreciated, RRR, no m/r/g, no carotid bruits  Lungs:Lungs are clear to auscultation bilaterally, no wheezes or crackles.     Neurological Examination:  The patient is " alert and oriented times four. Has good attention and concentration.Speech is fluent without dysarthria.   Cranial nerves:  CN I deferred.   CN II: Intact and full visual fields to confrontation bilaterally.   CN III, IV, VI: EOM intact. There is no nystagmus. Has conjugated gaze. Intact direct and consensual pupillary light reflexes.   CN V: Intact and symmetrical to facial sensation in the V1 through V3 bilaterally.   CN VII: Intact and symmetrical eyebrow and lid raise and eyelid closure, smiles and frown.   CN VIII: Intact to finger rub bilaterally.   CN IX and X: The palates elevates symmetrical. The uvula is midline.   CN XII: The tongue protrudes midline with no atrophy or fasciculations.   Motor exam: The patient has a normal tone throughout. There is no atrophy, fasciculations, clonus, or abnormal movements appreciated.   Strength Exam:  5/5 strength at shoulder abduction, elbow flexion or extension, wrist flexion or extension, finger abduction, , hip flexion and extension, knee flexion and extension, and dorsiflexion and plantarflexion bilaterally. However, right hip flexion 4+/5 when compare to left hip flexion.   Sensation is intact to light touch and pinprick throughout, except decreased up to her knees bilaterally. Has good vibration and proprioception sensation at great toes bilaterally.   Reflexes are 2+ and symmetrical at biceps, triceps, brachioradialis, 3+ bilateral patellar, and 2+ bilateral Achilles.   Negative Babinski with downgoing toes bilaterally.   Coordination reveals finger-nose-finger, finger tapping, and toe tapping with normal speed and accuracy.   Station and gait is normal.  Can walk on the toes, heels satisfactory but appears some difficulty right leg. Tandem walk difficulties. Has no drift and a negative Romberg. Nathan's negative          Assessment and Plan:  Right LE weakness/stumbling at times and lack of coordination in the right LE pregressive slow onset. Vetigo and  gait problems. Patient was seen on 4/14/2014 for recurrent GBS-like symptoms in 2000, 2008 and 2014 with a comprehensive neurology evaluations by outside neurologists in [facility], [facility], [facility] and normal EMG twice.  On today's exam patient appears to have some difficulties with tandem gait and bilaterally decreased pinprick up to the knees and right hip flexion appears weaker when compare to the left side and changed since patient's exam in 2014. Hyperreflexia but symetric down going flexors and no clonuses-no clear evidence of myelopathy.     Plan  Brain MRI and cervical MRI   EMG for localization of lesion  Follow up after the images and EMG    Valium prescription for 5 mg once 30 min before MRI given to the patient.     I discussed all my recommendation with Poornima Hilton. The patient verbalizes understanding and comfortable with the plan. The patient has our clinic phone number to call with any questions or concerns. All of the patient's questions were answered from the best of my current knowledge.     Thank you for letting me be a part of the treatment team for Poornima Hilton      Time spent with pt answering questions, discussing findings, counseling and coordinating care was more than 50% the appointment time, 52  minutes.         MAHOGANY Garcia, CNP  [facility]    Answers for HPI/ROS submitted by the patient on 4/8/2019   General Symptoms: No  Skin Symptoms: No  HENT Symptoms: No  EYE SYMPTOMS: No  HEART SYMPTOMS: Yes  LUNG SYMPTOMS: No  INTESTINAL SYMPTOMS: No  URINARY SYMPTOMS: No  GYNECOLOGIC SYMPTOMS: No  BREAST SYMPTOMS: No  SKELETAL SYMPTOMS: Yes  BLOOD SYMPTOMS: No  NERVOUS SYSTEM SYMPTOMS: Yes  MENTAL HEALTH SYMPTOMS: Yes  Chest pain or pressure: No  Fast or irregular heartbeat: No  Pain in legs with walking: No  Trouble breathing while lying down: No  Fingers or toes appear blue: No  High blood pressure: No  Low blood pressure: No  Fainting:  No  Murmurs: No  Pacemaker: No  Varicose veins: No  Edema or swelling: No  Wake up at night with shortness of breath: No  Light-headedness: Yes  Exercise intolerance: No  Back pain: No  Muscle aches: No  Neck pain: No  Swollen joints: No  Joint pain: No  Bone pain: No  Muscle cramps: No  Muscle weakness: Yes  Joint stiffness: No  Bone fracture: No  Trouble with coordination: Yes  Dizziness or trouble with balance: Yes  Fainting or black-out spells: No  Memory loss: No  Headache: Yes  Seizures: No  Speech problems: No  Tingling: Yes  Tremor: No  Weakness: Yes  Difficulty walking: Yes  Paralysis: No  Numbness: No  Nervous or Anxious: No  Depression: Yes  Trouble sleeping: Yes  Trouble thinking or concentrating: No  Mood changes: No  Panic attacks: No      Again, thank you for allowing me to participate in the care of your patient.      Sincerely,    MAHOGANY Dorman CNP

## 2019-04-08 NOTE — PROGRESS NOTES
"Re: Poornima Hilton      MRN# [id number]  YOB: 1955  Date of Visit: 4/8/2019    OUTPATIENT NEUROLOGY VISIT NOTE    Chief Complaint:  right LE weakness/stumbling at times and lack of coordination in the right LE.    History of Present Illness  Poornima Hilton is a 64-year-old right-handed presents to the clinic today for right LE weakness/stumbling at times and lack of coordination in the right LE. No falls reported. Left ankle fracture accidental while camping.   Patient saw PCP on 4/2/2019 for dizziness, right leg tripping. History of low pressure glaucoma, \" Reports a history of 3 \"GBS\" like symptoms in 2000, 2008 and 2014.\"  Per previous Neurology note from 3/26/2014.   The second episode of numbness and tingling sensation in her hands was in 2008.  She was seen at the [facility] for the above-mentioned symptoms. Reported normal EMG. Her outside records from [facility] were available for review. Apparently, the patient had a normal brain MRI and cervical MRI in 2008.  Her imaging evaluation was for demyelinating disease, and there was no evidence of any demyelinating.  Essentially normal contrast brain and cervical spine MRI.  Her EMG report was on 02/05/2008.  The electrophysiological studies were normal with no evidence of polyneuropathy or left cervical radiculopathy on this study.  She describes that her symptoms have a symmetrical pattern.    Her second episode of paresthesia lasted for 1-2 months without any further progression and resolved.      Patient reports that she she feels like to stumble with her right toes/foot and tends to \"stumble\". Slow progressing onset a year ago and was doing PT for left ankle fracture around a year ago. Patient reports that some weeks her right leg symptoms are worse some weeks than others. Patient reports that she used to hike a lot but now her right foot/right leg \"challenging.\" Patient reports that her right leg does not appear to be as " "strong. Patient reports that she does not feel coordinated enough and cannot go fast with the right leg. Patient reports that her right LE weakness and loss of coordination \"affects her life.\"   Patient reports that starting last fall (2018) she fell dizzy for 2 weeks and resolved but re-occurred. Patient reports that dizziness initially lasted for 2 weeks then resolved and then 2-3 weeks in October of 2018 and resolved. Patient reports that it re-occurred March 10th 2019 and resolved. Patient reports that she feels \"dizzy\" and \"off balance\" feeling. Reports that laying in the bed -not noticeable in March but was in the fall of 2018. Associated symptoms-none. Reports that in the fall dizziness would worse with tilting to the left but was not currently. Patient reports that during her dizziness she has been having on and off generalized headache and moderate and still can go \"about\" with her work.   No neck pain.   Left and right 4-5 fingers tingling bilaterally for 19 years and still there as oppose to a whole hand initially. Patient reports that tingling is still there daily in the hands for about 19 years. No symptoms in the feet.   \"History of relapsing-remitting paresthesias developing in an ascending fashion, from the toes to about the mid-calves and the hands, and gradually resolving without medical intervention, since year 2000. Each episode lasts from 1 to 4-5 months. The third episode began a week ago and patient s symptoms are limited to the toes and digits 4-5 of both hands. Query neuropathy. Previous EMG done at the [facility] was normal\"  EMG in 2014 normal  SSEP upper and lower extremities and were reported as normal.   .  Denies history of head or neck trauma, dizziness, vertigo, loss of consciousness, seizure, double vision, blurred vision, hearing difficulty, speech or swallowing difficulty, urinary or bowel incontinence, coordination problems or gait difficulty, fever or " chills.    Neurodiagnostic Testing  CT head -none  MRI brain and cervical MRI presumed normal outside images in 2008  Labs reviewed  KD, Complement, Cryoglobulin, Immunofixation, RF, Sjogren's were all normal  Results for JULIUS CASTELLANOS (MRN [id number]) as of 4/8/2019 10:19   Ref. Range 10/1/2018 16:35   TSH Latest Ref Range: 0.40 - 4.00 mU/L 1.10     Results for JULIUS CASTELLANOS (MRN [id number]) as of 4/8/2019 10:19   Ref. Range 3/26/2014 12:00   Vitamin B12 Latest Ref Range: >210 pg/mL 930     Results for JULIUS CASTELLANOS (MRN [id number]) as of 4/8/2019 10:19   Ref. Range 3/26/2014 12:00   Folate Latest Ref Range: >5.4 ng/mL >24.0...     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     Mild recurrent major depression (H) 10/1/2012     Osteoarthritis of carpometacarpal joint of thumb     bilaterally     Primary open angle glaucoma of both eyes, mild stage 12/01/2016       Past Surgical History reviewed and verified with the patient  Past Surgical History:   Procedure Laterality Date     ORIF left ankle  08/20/2017       Family History reviewed and verified with the patient  No neurological history    Social History:   of 41 years, one child, will be retiring this month and works in [employer]  Social History     Tobacco Use     Smoking status: Never Smoker     Smokeless tobacco: Never Used   Substance Use Topics     Alcohol use: Yes     Alcohol/week: 3.0 oz     Types: 6 Standard drinks or equivalent per week     Comment: 3 per week    reviewed and verified with the patient   No Known Allergies    Current Outpatient Medications   Medication Sig Dispense Refill     aspirin 81 MG tablet Take 1 tablet by mouth every morning        brinzolamide-brimonidine (SIMBRINZA) 1-0.2 % ophthalmic suspension Place 1 drop Into the  "left eye 2 times daily 8 mL 11     buPROPion (WELLBUTRIN SR) 100 MG 12 hr tablet Take 1 tablet (100 mg) by mouth 2 times daily 200 tablet 3     Calcium 600 MG tablet Take 1 tablet by mouth 2 times daily.       Glucosamine-Chondroit-Vit C-Mn (GLUCOSAMINE CHONDR 500 COMPLEX) CAPS Take 1,500 mg by mouth daily. (Patient taking differently: Take 1,500 mg by mouth 2 times daily ) 200 capsule 99     latanoprost (XALATAN) 0.005 % ophthalmic solution Place 1 drop into both eyes At Bedtime 7.5 mL 1     Multiple Vitamin (MULTIVITAMINS PO) Take by mouth every morning      reviewed and verified with the patient    Review of Systems:  A 12-point ROS including constitutional, eyes, ENT, respiratory, cardiovascular, gastroenterology, genitourinary, integumentary, musculoskeletal, neurology, hematology and psychiatric were all reviewed with the patient and completed at the [facility] and as mentioned in the HPI.   General Exam:   /75 (BP Location: Left arm, Patient Position: Sitting, Cuff Size: Adult Regular)   Pulse 85   Ht 1.651 m (5' 5\")   Wt 73 kg (161 lb)   SpO2 99%   BMI 26.79 kg/m    GEN: Awake, NAD; good eye contact, responses appropriately, no pain today but a mild headache but nothing concerning   HEENT: Head atraumatic/Normocephalic. Scalp normal. Pupils equally round, 4 mm, reactive to light and accommodation, sclera and conjunctiva normal. Fundoscopic examination reveals normal vessels no papilledema.   Neck: Easily moveable without resistance  Heart: S1/S2 appreciated, RRR, no m/r/g, no carotid bruits  Lungs:Lungs are clear to auscultation bilaterally, no wheezes or crackles.     Neurological Examination:  The patient is alert and oriented times four. Has good attention and concentration.Speech is fluent without dysarthria.   Cranial nerves:  CN I deferred.   CN II: Intact and full visual fields to confrontation bilaterally.   CN III, IV, VI: EOM intact. There is no nystagmus. Has " conjugated gaze. Intact direct and consensual pupillary light reflexes.   CN V: Intact and symmetrical to facial sensation in the V1 through V3 bilaterally.   CN VII: Intact and symmetrical eyebrow and lid raise and eyelid closure, smiles and frown.   CN VIII: Intact to finger rub bilaterally.   CN IX and X: The palates elevates symmetrical. The uvula is midline.   CN XII: The tongue protrudes midline with no atrophy or fasciculations.   Motor exam: The patient has a normal tone throughout. There is no atrophy, fasciculations, clonus, or abnormal movements appreciated.   Strength Exam:  5/5 strength at shoulder abduction, elbow flexion or extension, wrist flexion or extension, finger abduction, , hip flexion and extension, knee flexion and extension, and dorsiflexion and plantarflexion bilaterally. However, right hip flexion 4+/5 when compare to left hip flexion.   Sensation is intact to light touch and pinprick throughout, except decreased up to her knees bilaterally. Has good vibration and proprioception sensation at great toes bilaterally.   Reflexes are 2+ and symmetrical at biceps, triceps, brachioradialis, 3+ bilateral patellar, and 2+ bilateral Achilles.   Negative Babinski with downgoing toes bilaterally.   Coordination reveals finger-nose-finger, finger tapping, and toe tapping with normal speed and accuracy.   Station and gait is normal.  Can walk on the toes, heels satisfactory but appears some difficulty right leg. Tandem walk difficulties. Has no drift and a negative Romberg. Nathan's negative          Assessment and Plan:  Right LE weakness/stumbling at times and lack of coordination in the right LE pregressive slow onset. Vetigo and gait problems. Patient was seen on 4/14/2014 for recurrent GBS-like symptoms in 2000, 2008 and 2014 with a comprehensive neurology evaluations by outside neurologists in [facility], [facility], [facility] and normal EMG twice.  On today's exam  patient appears to have some difficulties with tandem gait and bilaterally decreased pinprick up to the knees and right hip flexion appears weaker when compare to the left side and changed since patient's exam in 2014. Hyperreflexia but symetric down going flexors and no clonuses-no clear evidence of myelopathy.     Plan  Brain MRI and cervical MRI   EMG for localization of lesion  Follow up after the images and EMG    Valium prescription for 5 mg once 30 min before MRI given to the patient.     I discussed all my recommendation with Poornima Hilton. The patient verbalizes understanding and comfortable with the plan. The patient has our clinic phone number to call with any questions or concerns. All of the patient's questions were answered from the best of my current knowledge.     Thank you for letting me be a part of the treatment team for Poornima Hilton      Time spent with pt answering questions, discussing findings, counseling and coordinating care was more than 50% the appointment time, 52  minutes.         MAHOGANY Garcia, CNP  [facility]    Answers for HPI/ROS submitted by the patient on 4/8/2019   General Symptoms: No  Skin Symptoms: No  HENT Symptoms: No  EYE SYMPTOMS: No  HEART SYMPTOMS: Yes  LUNG SYMPTOMS: No  INTESTINAL SYMPTOMS: No  URINARY SYMPTOMS: No  GYNECOLOGIC SYMPTOMS: No  BREAST SYMPTOMS: No  SKELETAL SYMPTOMS: Yes  BLOOD SYMPTOMS: No  NERVOUS SYSTEM SYMPTOMS: Yes  MENTAL HEALTH SYMPTOMS: Yes  Chest pain or pressure: No  Fast or irregular heartbeat: No  Pain in legs with walking: No  Trouble breathing while lying down: No  Fingers or toes appear blue: No  High blood pressure: No  Low blood pressure: No  Fainting: No  Murmurs: No  Pacemaker: No  Varicose veins: No  Edema or swelling: No  Wake up at night with shortness of breath: No  Light-headedness: Yes  Exercise intolerance: No  Back pain: No  Muscle aches: No  Neck pain: No  Swollen joints: No  Joint pain: No  Bone pain:  No  Muscle cramps: No  Muscle weakness: Yes  Joint stiffness: No  Bone fracture: No  Trouble with coordination: Yes  Dizziness or trouble with balance: Yes  Fainting or black-out spells: No  Memory loss: No  Headache: Yes  Seizures: No  Speech problems: No  Tingling: Yes  Tremor: No  Weakness: Yes  Difficulty walking: Yes  Paralysis: No  Numbness: No  Nervous or Anxious: No  Depression: Yes  Trouble sleeping: Yes  Trouble thinking or concentrating: No  Mood changes: No  Panic attacks: No

## 2019-04-08 NOTE — NURSING NOTE
Chief Complaint   Patient presents with     RECHECK     DIZZINESS AND WEAKNESS OF RIGHT FOOT       Tara Yin MA

## 2019-05-06 ENCOUNTER — OFFICE VISIT (OUTPATIENT)
Dept: NEUROLOGY | Facility: CLINIC | Age: 64
End: 2019-05-06
Attending: NURSE PRACTITIONER
Payer: COMMERCIAL

## 2019-05-06 DIAGNOSIS — R26.9 GAIT DIFFICULTY: ICD-10-CM

## 2019-05-06 DIAGNOSIS — R29.898 TRANSIENT RIGHT LEG WEAKNESS: ICD-10-CM

## 2019-05-06 NOTE — PROGRESS NOTES
[facility]  Electrodiagnostic Laboratory    Nerve Conduction & EMG Report          Patient:       Poornima Hilton  Patient ID:    [id number]  Gender:        Female  YOB: 1955  Age:           64 Years 2 Months      History & Examination: This 64-year-old woman presents with right lower extremity dysfunction for over course of a year.  Her symptoms tends to fluctuate.  She denies any sensory symptoms or pain.  Neurological examination reveals mild hyperreflexia in the right lower extremity involving patellar and Achilles reflexes.  Manual motor examination reveals normal muscle strength.  This study was requested to evaluate for an underlying neuromuscular cause of her symptoms.      Techniques:  Sensory and Motor conduction studies were done with surface recording electrodes. EMG was done with a concentric needle electrode.     Results: Sensory nerve conduction study of the right sural and superficial peroneal nerve demonstrated normal sensory nerve action potential amplitude and conduction velocities.  Motor conduction study of the right peroneal and tibial nerves demonstrated normal distal motor latencies, conduction velocities and compound motor action potential amplitudes.  Needle EMG of the selected muscles of the right lower extremity demonstrated lack of abnormal spontaneous activity including paraspinal muscles as tabulated.  During voluntary activation of the selected muscles motor unit action potential amplitudes were normal, with normal configuration and duration.  Recruitment was normal as well.    Interpretation:  This is a normal study.  There is no electrodiagnostic evidence for right-sided lumbosacral radiculopathy, myopathy or mononeuropathies.      EMG Physician:  Tavo Fernandez MD.          Sensory NCS      Nerve / Sites Rec. Site Onset Peak NP Amp Ref. PP Amp Dist Man Ref. Temp     ms ms  V  V  V cm m/s m/s  C   R SURAL - Lat Mall 60      Calf Ankle 2.97 3.80 11.3  5.0 12.0 14 47.2 38.0 31.2   R SUP PERONEAL      Lat Leg Sullivan 2.81 4.11 6.8  12.2 12.5 44.4 38.0 31.2       Motor NCS      Nerve / Sites Rec. Site Lat Ref. Amp Ref. Rel Amp Dist Man Ref. Dur. Area Temp.     ms ms mV mV % cm m/s m/s ms %  C   R DEEP PERONEAL - EDB 60      Ankle EDB 5.31 6.00 7.8 2.0 100 8   7.40 100 31.4      FibHead EDB 11.93  7.4  94.8 29 43.8 38.0 7.97 94.5 31.4      Pop Fos EDB 13.70  7.4  93.7 8 45.2 38.0 8.07 93.5 31.3   R TIBIAL - AH      Ankle AH 3.49 6.00 14.0 4.0 100 8   7.14 100 24.6      Pop Fos AH 11.98  11.3  80.6 42 49.5 38.0 8.44 81.8 24.6       Needle EMG PK      EMG Summary Table     Spontaneous MUAP Recruitment    IA Fib/PSW Fasc H.F. Amp Dur. PPP Pattern   R. TIB ANTERIOR N None None None N N N N   R. GASTROCN (MED) N None None None N N N N   R. VAST LATERALIS N None None None N N N N   R. BIC FEM (S HEAD) N None None None N N N N   R. L5 PSP N None None None       R. L3 PSP N None None None       R. ILIOPSOAS N None None None N N N N                CC  Patient Care Team:  Maxim Marcum MD as PCP - General  Yana Otero, APRN CNP as Nurse Practitioner (Nurse Practitioner)  YANA OTERO    Copy to patient  JULIUS CASTELLANOS  [address] [place] [address]

## 2019-05-06 NOTE — LETTER
5/6/2019       RE: Poornima Hilton  [address] [place] [address]     Dear Colleague,    Thank you for referring your patient, Poornima Hilton, to the [facility] EMG at [facility]. Please see a copy of my visit note below.        [facility]  Electrodiagnostic Laboratory    Nerve Conduction & EMG Report          Patient:       Poornima Hilton  Patient ID:    [id number]  Gender:        Female  YOB: 1955  Age:           64 Years 2 Months      History & Examination: This 64-year-old woman presents with right lower extremity dysfunction for over course of a year.  Her symptoms tends to fluctuate.  She denies any sensory symptoms or pain.  Neurological examination reveals mild hyperreflexia in the right lower extremity involving patellar and Achilles reflexes.  Manual motor examination reveals normal muscle strength.  This study was requested to evaluate for an underlying neuromuscular cause of her symptoms.      Techniques:  Sensory and Motor conduction studies were done with surface recording electrodes. EMG was done with a concentric needle electrode.     Results: Sensory nerve conduction study of the right sural and superficial peroneal nerve demonstrated normal sensory nerve action potential amplitude and conduction velocities.  Motor conduction study of the right peroneal and tibial nerves demonstrated normal distal motor latencies, conduction velocities and compound motor action potential amplitudes.  Needle EMG of the selected muscles of the right lower extremity demonstrated lack of abnormal spontaneous activity including paraspinal muscles as tabulated.  During voluntary activation of the selected muscles motor unit action potential amplitudes were normal, with normal configuration and duration.  Recruitment was normal as well.    Interpretation:  This is a normal study.  There is no electrodiagnostic evidence for right-sided lumbosacral  radiculopathy, myopathy or mononeuropathies.      Sensory NCS      Nerve / Sites Rec. Site Onset Peak NP Amp Ref. PP Amp Dist Man Ref. Temp     ms ms  V  V  V cm m/s m/s  C   R SURAL - Lat Mall 60      Calf Ankle 2.97 3.80 11.3 5.0 12.0 14 47.2 38.0 31.2   R SUP PERONEAL      Lat Leg Sullivan 2.81 4.11 6.8  12.2 12.5 44.4 38.0 31.2       Motor NCS      Nerve / Sites Rec. Site Lat Ref. Amp Ref. Rel Amp Dist Man Ref. Dur. Area Temp.     ms ms mV mV % cm m/s m/s ms %  C   R DEEP PERONEAL - EDB 60      Ankle EDB 5.31 6.00 7.8 2.0 100 8   7.40 100 31.4      FibHead EDB 11.93  7.4  94.8 29 43.8 38.0 7.97 94.5 31.4      Pop Fos EDB 13.70  7.4  93.7 8 45.2 38.0 8.07 93.5 31.3   R TIBIAL - AH      Ankle AH 3.49 6.00 14.0 4.0 100 8   7.14 100 24.6      Pop Fos AH 11.98  11.3  80.6 42 49.5 38.0 8.44 81.8 24.6       Needle EMG PK      EMG Summary Table     Spontaneous MUAP Recruitment    IA Fib/PSW Fasc H.F. Amp Dur. PPP Pattern   R. TIB ANTERIOR N None None None N N N N   R. GASTROCN (MED) N None None None N N N N   R. VAST LATERALIS N None None None N N N N   R. BIC FEM (S HEAD) N None None None N N N N   R. L5 PSP N None None None       R. L3 PSP N None None None       R. ILIOPSOAS N None None None N N N N                Again, thank you for allowing me to participate in the care of your patient.      Sincerely,    Tavo Fernandez MD    CC  Patient Care Team:  Maxim Marcum MD as PCP - General  Yana Otero, APRN CNP as Nurse Practitioner (Nurse Practitioner)  YANA OTERO    Copy to patient  JULIUS CASTELLANOS  [address] [place] [address]

## 2019-05-07 ENCOUNTER — ANCILLARY PROCEDURE (OUTPATIENT)
Dept: MRI IMAGING | Facility: CLINIC | Age: 64
End: 2019-05-07
Attending: NURSE PRACTITIONER
Payer: COMMERCIAL

## 2019-05-07 DIAGNOSIS — R26.9 GAIT DIFFICULTY: ICD-10-CM

## 2019-05-07 DIAGNOSIS — R42 VERTIGO: ICD-10-CM

## 2019-05-07 DIAGNOSIS — R29.898 TRANSIENT RIGHT LEG WEAKNESS: ICD-10-CM

## 2019-05-07 RX ORDER — GADOBUTROL 604.72 MG/ML
7.5 INJECTION INTRAVENOUS ONCE
Status: COMPLETED | OUTPATIENT
Start: 2019-05-07 | End: 2019-05-07

## 2019-05-07 RX ADMIN — GADOBUTROL 7.5 ML: 604.72 INJECTION INTRAVENOUS at 17:01

## 2019-05-10 ENCOUNTER — OFFICE VISIT (OUTPATIENT)
Dept: NEUROLOGY | Facility: CLINIC | Age: 64
End: 2019-05-10
Payer: COMMERCIAL

## 2019-05-10 VITALS
DIASTOLIC BLOOD PRESSURE: 77 MMHG | BODY MASS INDEX: 26.46 KG/M2 | SYSTOLIC BLOOD PRESSURE: 127 MMHG | OXYGEN SATURATION: 100 % | TEMPERATURE: 97.8 F | HEIGHT: 65 IN | HEART RATE: 81 BPM | RESPIRATION RATE: 16 BRPM | WEIGHT: 158.8 LBS

## 2019-05-10 DIAGNOSIS — R26.9 GAIT DIFFICULTY: Primary | ICD-10-CM

## 2019-05-10 DIAGNOSIS — R93.7 ABNORMAL MRI, CERVICAL SPINE: ICD-10-CM

## 2019-05-10 DIAGNOSIS — R29.898 WEAKNESS OF RIGHT LOWER EXTREMITY: ICD-10-CM

## 2019-05-10 DIAGNOSIS — F40.240 CLAUSTROPHOBIA: ICD-10-CM

## 2019-05-10 DIAGNOSIS — N28.9 RENAL INSUFFICIENCY: ICD-10-CM

## 2019-05-10 DIAGNOSIS — R29.898 TRANSIENT RIGHT LEG WEAKNESS: ICD-10-CM

## 2019-05-10 DIAGNOSIS — R79.89 HIGH SERUM CALCIUM: Primary | ICD-10-CM

## 2019-05-10 LAB
ANION GAP SERPL CALCULATED.3IONS-SCNC: 8 MMOL/L (ref 3–14)
BUN SERPL-MCNC: 18 MG/DL (ref 7–30)
CALCIUM SERPL-MCNC: 10.6 MG/DL (ref 8.5–10.1)
CHLORIDE SERPL-SCNC: 102 MMOL/L (ref 94–109)
CO2 SERPL-SCNC: 28 MMOL/L (ref 20–32)
CREAT SERPL-MCNC: 0.86 MG/DL (ref 0.52–1.04)
GFR SERPL CREATININE-BSD FRML MDRD: 71 ML/MIN/{1.73_M2}
GLUCOSE SERPL-MCNC: 87 MG/DL (ref 70–99)
HBA1C MFR BLD: 5.7 % (ref 0–5.6)
POTASSIUM SERPL-SCNC: 3.5 MMOL/L (ref 3.4–5.3)
SODIUM SERPL-SCNC: 138 MMOL/L (ref 133–144)
VIT B12 SERPL-MCNC: 1269 PG/ML (ref 193–986)

## 2019-05-10 RX ORDER — DIAZEPAM 5 MG
TABLET ORAL
Qty: 2 TABLET | Refills: 0 | Status: SHIPPED | OUTPATIENT
Start: 2019-05-10 | End: 2019-06-04

## 2019-05-10 ASSESSMENT — MIFFLIN-ST. JEOR: SCORE: 1266.19

## 2019-05-10 NOTE — NURSING NOTE
Chief Complaint   Patient presents with     RECHECK     [facility] RETURN -      Results     Maria Fernanda Jordan

## 2019-05-10 NOTE — PROGRESS NOTES
"Re: Poornima Hilton      MRN# [id number]  YOB: 1955  Date of Visit: 5/10/2019    OUTPATIENT NEUROLOGY VISIT NOTE    Reason for Visit:  To review MRI and EMG results    Interval History:  Poornima Hilton is 64-year-old presents to the clinic today for review her brain MR images and neck MRI and EMG results discussion.     Patient reports that she she feels like to stumble with her right toes/foot and tends to \"stumble\". Slow progressing onset a year ago and was doing PT for left ankle fracture around two years ago.   Patient reports that she gained weight, no night sweats  Headaches are \"comes and goes\" and more behind right eye. Patient reports that she has a mild glaucoma and vision prescription glasses had to be changed and presumed no other eye changes. Patient reports that she always sees double due to astigmatism.     EMG results reviewed and no electrophysiological evidence of neuropathy, plexopathy or radiculopathy  Brain and cervical MRI reviewed and cervical MRI hyperintense lesion suspicious for inflammatory process, brain MRI no apparent abnormal signal or enhancement seen.     Plan:  Thoracic and lumbar MRI for any evidence of possible inflammatory process    Valium as instructed before MRI. Arrange for a    Lab:A1C, KD, Sjogren's,vitamin E, B12 and MMA, copper,  Lumbar puncture -next step once images reviewed  Follow up after images and LP    Physical therapy    Neurodiagnostic Testing:  MRI cervical   MR CERVICAL SPINE W/O & W CONTRAST 5/7/2019 5:31 PM      Contrast: 7.5mL Gadavist     Findings:  The cervical vertebrae appear normally aligned.  There is multilevel  disc height narrowing and disc desiccation, most pronounced at C6-7  with moderate disc height loss..  There is abnormal signal within the  posterior central cervical spinal cord at the level of C5-6. There is  T1 and T2 hyperintense signal within the C4 vertebral body, which is  likely benign and may represent a cavernous " hemangioma or focal fatty  deposition. There is no abnormal contrast enhancement within the  cervical spinal cord, thecal sac or vertebral column.  The findings on  a level by level basis are as follows:     C2-3: No spinal canal or neural foraminal narrowing.     C3-4:  No spinal canal or neural foraminal narrowing.     C4-5:  Mild uncinate hypertrophy. Facet arthropathy bilaterally. Mild  right neural foraminal stenosis. Left neural foramen and spinal canal  are patent.     C5-6:  Facet arthropathy bilaterally. Uncinate hypertrophy, slightly  greater on the right. Mild right neural foraminal stenosis. Left  neural foramen and spinal canal are patent.     C6-7:  Small posterior disc bulge. No spinal canal or neural foraminal  stenosis.     C7-T1:  No spinal canal or neural foraminal stenosis.     No abnormality of the paraspinous soft tissues.                                                                      Impression:      1. T2 hyperintense signal without associated mass effect or  enhancement, may represent old demyelination or may be related to  chronic traumatic insult.  2. Multilevel cervical spondylosis, most pronounced at C4-5 and C5-6  with mild right neural foraminal stenosis.     AGA HSIEH MD  Labs reviewed  B12 in 2014 930  Results for JULIUS CASTELLANOS (MRN [id number]) as of 5/10/2019 11:04   Ref. Range 10/1/2018 16:35   TSH Latest Ref Range: 0.40 - 4.00 mU/L 1.10     Past Medical History reviewed     Social History     Tobacco Use     Smoking status: Never Smoker     Smokeless tobacco: Never Used   Substance Use Topics     Alcohol use: Yes     Alcohol/week: 3.0 oz     Types: 6 Standard drinks or equivalent per week     Comment: 3 per week    reviewed and verified with the patient   No Known Allergies    Current Outpatient Medications   Medication Sig Dispense Refill     aspirin 81 MG tablet Take 1 tablet by mouth every morning        brinzolamide-brimonidine (SIMBRINZA) 1-0.2 % ophthalmic  "suspension Place 1 drop Into the left eye 2 times daily 8 mL 11     buPROPion (WELLBUTRIN SR) 100 MG 12 hr tablet Take 1 tablet (100 mg) by mouth 2 times daily 200 tablet 3     Calcium 600 MG tablet Take 1 tablet by mouth 2 times daily.       Glucosamine-Chondroit-Vit C-Mn (GLUCOSAMINE CHONDR 500 COMPLEX) CAPS Take 1,500 mg by mouth daily. (Patient taking differently: Take 1,500 mg by mouth 2 times daily ) 200 capsule 99     latanoprost (XALATAN) 0.005 % ophthalmic solution Place 1 drop into both eyes At Bedtime 7.5 mL 1     Multiple Vitamin (MULTIVITAMINS PO) Take by mouth every morning        diazepam (VALIUM) 5 MG tablet Take one tab 30 min before MRI. Arrange a ride (Patient not taking: Reported on 5/10/2019) 1 tablet 0   reviewed and verified with the patient    Review of Systems:   A 10-point ROS including constitutional, eyes, respiratory, cardiovascular, gastroenterology, genitourinary, integumentary, musculoskeletal, neurology and psychiatric were all negative except as mentioned in the interval history.      General Exam:   /77 (BP Location: Left arm, Patient Position: Sitting, Cuff Size: Adult Regular)   Pulse 81   Temp 97.8  F (36.6  C) (Oral)   Resp 16   Ht 1.651 m (5' 5\")   Wt 72 kg (158 lb 12.8 oz)   SpO2 100%   BMI 26.43 kg/m    GEN: Awake, NAD; good eye contact, responses appropriately    Speech is fluent without dysarthria. EOM intact. TFace is symmetrical. Intact and symmetrical eyebrow and lid raise and eyelid closure, smiles. Hearing Intact to conversation speech. The palates elevates symmetrical. The tongue protrudes midline with no atrophy or fasciculations. Strength  5/5 in the upper and lower extremities bilaterally but right LE flexion 4/5 when compare to left LE. Sensation is intact to  touch throughout.  Reflexes brisk symmetrical at  patellar, and Achilles. Negative Babinski with downgoing toes bilaterally. Able to walk on toes and heels but appears difficulty with heel " walking tandem walk.     Assessment and Plan:  See Interval History for our discussion and plan    I discussed all my recommendation with Poornima Lida. The patient verbalizes understanding and comfortable with the plan. The patient has our clinic phone number to call with any questions or concerns. All of the patient's questions were answered from the best of my current knowledge.   Time spent with pt answering questions, discussing findings, counseling and coordinating care was more than 50% the appointment time, 25  minutes.         MAHOGANY Garcia, CNP  [facility]

## 2019-05-10 NOTE — PATIENT INSTRUCTIONS
Plan:  Thoracic and lumbar MRI for any evidence of possible inflammatory process    Valium as instructed before MRI. Arrange for a    Lab:A1C, KD, Sjogren's,vitamin E, B12 and MMA, copper,  Lumbar puncture -next step once images reviewed  Follow up after images and LP

## 2019-05-11 LAB
ENA SS-A IGG SER IA-ACNC: <0.2 AI (ref 0–0.9)
ENA SS-B IGG SER IA-ACNC: <0.2 AI (ref 0–0.9)

## 2019-05-12 LAB — METHYLMALONATE SERPL-SCNC: 0.16 UMOL/L (ref 0–0.4)

## 2019-05-13 ENCOUNTER — ANCILLARY PROCEDURE (OUTPATIENT)
Dept: MRI IMAGING | Facility: CLINIC | Age: 64
End: 2019-05-13
Attending: NURSE PRACTITIONER
Payer: COMMERCIAL

## 2019-05-13 DIAGNOSIS — R93.7 ABNORMAL MRI, CERVICAL SPINE: ICD-10-CM

## 2019-05-13 DIAGNOSIS — R29.898 WEAKNESS OF RIGHT LOWER EXTREMITY: ICD-10-CM

## 2019-05-13 DIAGNOSIS — R26.9 GAIT DIFFICULTY: ICD-10-CM

## 2019-05-13 LAB
A-TOCOPHEROL VIT E SERPL-MCNC: 12.3 MG/L (ref 5.5–18)
ANA SER QL IF: NEGATIVE
BETA+GAMMA TOCOPHEROL SERPL-MCNC: 1.3 MG/L (ref 0–6)
COPPER SERPL-MCNC: 143 UG/DL (ref 80–155)

## 2019-05-13 RX ORDER — GADOBUTROL 604.72 MG/ML
7.5 INJECTION INTRAVENOUS ONCE
Status: COMPLETED | OUTPATIENT
Start: 2019-05-13 | End: 2019-05-13

## 2019-05-13 RX ADMIN — GADOBUTROL 7 ML: 604.72 INJECTION INTRAVENOUS at 17:41

## 2019-05-13 NOTE — RESULT ENCOUNTER NOTE
Abnormal T2 hyperintensity signal and discussed with the patient. Possibly explaining chronic right leg weakness. Recommended thoracic and lumbar MRI and LP after the imaging. Discussed with one of our MS neurologist.   Discussed with the patient the findings and recommendations for follow up-see visit note on 5/13/2019

## 2019-05-13 NOTE — DISCHARGE INSTRUCTIONS

## 2019-05-14 ENCOUNTER — MYC MEDICAL ADVICE (OUTPATIENT)
Dept: INTERNAL MEDICINE | Facility: CLINIC | Age: 64
End: 2019-05-14

## 2019-05-14 DIAGNOSIS — E28.39 ESTROGEN DEFICIENCY: Primary | ICD-10-CM

## 2019-05-15 ENCOUNTER — MYC MEDICAL ADVICE (OUTPATIENT)
Dept: OPHTHALMOLOGY | Facility: CLINIC | Age: 64
End: 2019-05-15

## 2019-05-15 DIAGNOSIS — R29.898 WEAKNESS OF RIGHT LOWER EXTREMITY: Primary | ICD-10-CM

## 2019-05-15 DIAGNOSIS — R93.7 ABNORMAL MRI, CERVICAL SPINE: ICD-10-CM

## 2019-05-16 ENCOUNTER — HOSPITAL ENCOUNTER (OUTPATIENT)
Facility: CLINIC | Age: 64
Discharge: HOME | End: 2019-05-16
Payer: COMMERCIAL

## 2019-05-16 VITALS
TEMPERATURE: 97.5 F | HEART RATE: 81 BPM | BODY MASS INDEX: 25.83 KG/M2 | OXYGEN SATURATION: 99 % | DIASTOLIC BLOOD PRESSURE: 79 MMHG | SYSTOLIC BLOOD PRESSURE: 126 MMHG | HEIGHT: 65 IN | RESPIRATION RATE: 16 BRPM | WEIGHT: 155 LBS

## 2019-05-16 ASSESSMENT — MIFFLIN-ST. JEOR: SCORE: 1248.96

## 2019-05-16 NOTE — PROGRESS NOTES
Pt scheduled for Lumbar Puncture. Pt is on Aspirin & had last dose this morning. Pt states that she was never asked about blood thinners or aspirin when procedure was scheduled. Per protocol - Aspirin is a 5 day hold.    LP rescheduled for 5/23. Pt &  informed. All questions & concerns addressed. Pre-procedure instructions given.

## 2019-05-17 NOTE — RESULT ENCOUNTER NOTE
Reviewed thoracic MRI results with and without contrast and no abnormal cord signal at this time. Recommended to proceed with LP for demyelinating process and abnormal cervical MRI. See communication with patient in Epic

## 2019-05-17 NOTE — RESULT ENCOUNTER NOTE
Patient was notified about the results and next step to proceed with LP to evaluate for demyelinating process.   Patient was in agreement with the plan

## 2019-05-23 ENCOUNTER — HOSPITAL ENCOUNTER (OUTPATIENT)
Facility: CLINIC | Age: 64
Discharge: HOME | End: 2019-05-23
Admitting: PHYSICIAN ASSISTANT
Payer: COMMERCIAL

## 2019-05-23 ENCOUNTER — HOSPITAL ENCOUNTER (OUTPATIENT)
Dept: GENERAL RADIOLOGY | Facility: CLINIC | Age: 64
End: 2019-05-23
Attending: NURSE PRACTITIONER
Payer: COMMERCIAL

## 2019-05-23 VITALS
OXYGEN SATURATION: 98 % | RESPIRATION RATE: 16 BRPM | DIASTOLIC BLOOD PRESSURE: 74 MMHG | SYSTOLIC BLOOD PRESSURE: 126 MMHG | HEART RATE: 65 BPM | TEMPERATURE: 97.6 F

## 2019-05-23 DIAGNOSIS — R29.898 WEAKNESS OF RIGHT LOWER EXTREMITY: ICD-10-CM

## 2019-05-23 DIAGNOSIS — R93.7 ABNORMAL MRI, CERVICAL SPINE: ICD-10-CM

## 2019-05-23 LAB
APPEARANCE CSF: CLEAR
COLOR CSF: COLORLESS
GLUCOSE CSF-MCNC: 58 MG/DL (ref 40–70)
GRAM STN SPEC: NORMAL
Lab: NORMAL
PROT CSF-MCNC: 26 MG/DL (ref 15–60)
RBC # CSF MANUAL: 1 /UL (ref 0–2)
SPECIMEN SOURCE: NORMAL
TUBE # CSF: 4 #
WBC # CSF MANUAL: 1 /UL (ref 0–5)

## 2019-05-23 PROCEDURE — 87206 SMEAR FLUORESCENT/ACID STAI: CPT | Performed by: NURSE PRACTITIONER

## 2019-05-23 PROCEDURE — 87015 SPECIMEN INFECT AGNT CONCNTJ: CPT | Performed by: NURSE PRACTITIONER

## 2019-05-23 PROCEDURE — 83916 OLIGOCLONAL BANDS: CPT | Performed by: NURSE PRACTITIONER

## 2019-05-23 PROCEDURE — 88185 FLOWCYTOMETRY/TC ADD-ON: CPT | Performed by: NURSE PRACTITIONER

## 2019-05-23 PROCEDURE — 82164 ANGIOTENSIN I ENZYME TEST: CPT | Performed by: NURSE PRACTITIONER

## 2019-05-23 PROCEDURE — 88108 CYTOPATH CONCENTRATE TECH: CPT | Performed by: NURSE PRACTITIONER

## 2019-05-23 PROCEDURE — 40001004 ZZHCL STATISTIC FLOW INT 9-15 ABY TC 88188: Performed by: NURSE PRACTITIONER

## 2019-05-23 PROCEDURE — 82784 ASSAY IGA/IGD/IGG/IGM EACH: CPT | Performed by: NURSE PRACTITIONER

## 2019-05-23 PROCEDURE — 87205 SMEAR GRAM STAIN: CPT | Performed by: NURSE PRACTITIONER

## 2019-05-23 PROCEDURE — 86617 LYME DISEASE ANTIBODY: CPT | Performed by: NURSE PRACTITIONER

## 2019-05-23 PROCEDURE — 00000102 ZZHCL STATISTIC CYTO WRIGHT STAIN TC: Performed by: NURSE PRACTITIONER

## 2019-05-23 PROCEDURE — 87070 CULTURE OTHR SPECIMN AEROBIC: CPT | Performed by: NURSE PRACTITIONER

## 2019-05-23 PROCEDURE — 84157 ASSAY OF PROTEIN OTHER: CPT | Performed by: NURSE PRACTITIONER

## 2019-05-23 PROCEDURE — 88184 FLOWCYTOMETRY/ TC 1 MARKER: CPT | Performed by: NURSE PRACTITIONER

## 2019-05-23 PROCEDURE — 87102 FUNGUS ISOLATION CULTURE: CPT | Performed by: NURSE PRACTITIONER

## 2019-05-23 PROCEDURE — 77003 FLUOROGUIDE FOR SPINE INJECT: CPT

## 2019-05-23 PROCEDURE — 87116 MYCOBACTERIA CULTURE: CPT | Performed by: NURSE PRACTITIONER

## 2019-05-23 PROCEDURE — 40000863 ZZH STATISTIC RADIOLOGY XRAY, US, CT, MAR, NM

## 2019-05-23 PROCEDURE — 89050 BODY FLUID CELL COUNT: CPT | Performed by: NURSE PRACTITIONER

## 2019-05-23 PROCEDURE — 86618 LYME DISEASE ANTIBODY: CPT | Performed by: NURSE PRACTITIONER

## 2019-05-23 PROCEDURE — 88108 CYTOPATH CONCENTRATE TECH: CPT | Mod: 26 | Performed by: NURSE PRACTITIONER

## 2019-05-23 PROCEDURE — 82040 ASSAY OF SERUM ALBUMIN: CPT | Performed by: NURSE PRACTITIONER

## 2019-05-23 PROCEDURE — 82945 GLUCOSE OTHER FLUID: CPT | Performed by: NURSE PRACTITIONER

## 2019-05-23 PROCEDURE — 82042 OTHER SOURCE ALBUMIN QUAN EA: CPT | Performed by: NURSE PRACTITIONER

## 2019-05-23 PROCEDURE — 86592 SYPHILIS TEST NON-TREP QUAL: CPT | Performed by: NURSE PRACTITIONER

## 2019-05-23 RX ORDER — FAMOTIDINE 20 MG
TABLET ORAL DAILY
COMMUNITY

## 2019-05-23 RX ORDER — DEXTROSE MONOHYDRATE 25 G/50ML
25-50 INJECTION, SOLUTION INTRAVENOUS
Status: DISCONTINUED | OUTPATIENT
Start: 2019-05-23 | End: 2019-05-23 | Stop reason: HOSPADM

## 2019-05-23 RX ORDER — LIDOCAINE HYDROCHLORIDE 10 MG/ML
1 INJECTION, SOLUTION EPIDURAL; INFILTRATION; INTRACAUDAL; PERINEURAL ONCE
Status: COMPLETED | OUTPATIENT
Start: 2019-05-23 | End: 2019-05-23

## 2019-05-23 RX ORDER — NICOTINE POLACRILEX 4 MG
15-30 LOZENGE BUCCAL
Status: DISCONTINUED | OUTPATIENT
Start: 2019-05-23 | End: 2019-05-23 | Stop reason: HOSPADM

## 2019-05-23 RX ORDER — NICOTINE POLACRILEX 4 MG
15-30 LOZENGE BUCCAL
Status: CANCELLED | OUTPATIENT
Start: 2019-05-23

## 2019-05-23 RX ORDER — DEXTROSE MONOHYDRATE 25 G/50ML
25-50 INJECTION, SOLUTION INTRAVENOUS
Status: CANCELLED | OUTPATIENT
Start: 2019-05-23

## 2019-05-23 RX ADMIN — LIDOCAINE HYDROCHLORIDE 2 ML: 10 INJECTION, SOLUTION EPIDURAL; INFILTRATION; INTRACAUDAL; PERINEURAL at 10:32

## 2019-05-23 NOTE — DISCHARGE INSTRUCTIONS
Lumbar Puncture Discharge Instructions     After you go home:      You may resume your normal diet    Continue to drink at least 8 ounces of fluid every 1-2 hours until bedtime tonight and continue to drink extra fluids for the next 2 days    Caffeinated beverages may help prevent or reduce spinal headaches    Care of Puncture Site:      If there is a bandaid - you may remove it tomorrow morning    You may shower tomorrow    No tub baths, whirlpools or swimming for 48 hours     Activity - to help prevent spinal headache or spinal fluid leakage:      Minimize your activity today. Flat bedrest for 24 hrs is strongly suggested as this will help to prevent a spinal headache.  You can be up to the bathroom and for meals.    Resume normal activities tomorrow.     Avoid strenuous activity for the next 2 days    Do not drive a vehicle until tomorrow morning    Medicines:      You may resume all medications    Resume your Warfarin/Coumadin at your regular dose today. Follow up with your provider to have your INR rechecked    Resume your Platelet Inhibitors and Aspirin tomorrow at your regular dose    For minor pain, you may take Acetaminophen (Tylenol) or Ibuprofen (Advil)            Call the provider who ordered this procedure if:      Your headache becomes worse or is severe. (A minor headache is not unusual)    You have nausea or vomiting    The site is red, swollen, hot or tender    You have chills or a fever greater than 101 F (38 C)    Any questions or concerns    If you have questions call:        [facility] Radiology Dept @ [phone]    The provider who performed your procedure was Dr. Blake.

## 2019-05-23 NOTE — PROGRESS NOTES
Care Suites Discharge Nursing Note    Education/questions answered: yes  Patient DC location: door 1  Accompanied by:   CS discharge time: 1250

## 2019-05-23 NOTE — PROGRESS NOTES
RADIOLOGY PROCEDURE NOTE  Patient name: Poornima Hilton  MRN: [id number]  : 1955    Pre-procedure diagnosis: right lower extremity weakness and abnormal cervical MRI, rule out demyelinating disease  Post-procedure diagnosis: Same    Procedure Date/Time: May 23, 2019  11:48 AM  Procedure: lumbar puncture  Estimated blood loss: None  Specimen(s) collected with description: 18mL clear CSF  The patient tolerated the procedure well with no immediate complications.  Significant findings:opening pressure 18 cm    See imaging dictation for procedural details.    Provider name: Mimi Amanda  Assistant(s):None

## 2019-05-23 NOTE — PROGRESS NOTES
Care Suites Admission Nursing Note    Reason for admission: Lumbar Puncture  CS arrival time: 0930  Accompanied by:  Quentin  Name/phone of DC :  Quentin [phone]  Medications held: Asprin held since 5/15/19  Consent signed: per MD  Abnormal assessment/labs: na  If abnormal, provider notified: na  Education/questions answered: yes  Plan: Lumbar Puncture

## 2019-05-24 LAB
ACE CSF-CCNC: 1.5 U/L (ref 0–2.5)
B BURGDOR AB CSF IA-ACNC: 0.01 LIV
COPATH REPORT: NORMAL
COPATH REPORT: NORMAL

## 2019-05-25 LAB
B BURGDOR IGG CSF QL IB: NEGATIVE
B BURGDOR IGM CSF QL IB: NEGATIVE
VDRL CSF QL: NON REACTIVE

## 2019-05-26 LAB
ACID FAST STN SPEC QL: NORMAL
ACID FAST STN SPEC QL: NORMAL
ALB CSF/SERPL: 2.2 RATIO (ref 0–9)
ALBUMIN CSF-MCNC: 9 MG/DL (ref 0–35)
ALBUMIN SERPL-MCNC: 4080 MG/DL (ref 3500–5200)
IGG CSF-MCNC: 2.2 MG/DL (ref 0–6)
IGG SERPL-MCNC: 830 MG/DL (ref 768–1632)
IGG SYNTH RATE SER+CSF CALC-MRATE: 3.4 MG/D
IGG/ALB CLEAR SER+CSF-RTO: 1.2 RATIO (ref 0.28–0.66)
IGG/ALB CSF: 0.24 RATIO (ref 0.09–0.25)
OLIGOCLONAL BANDS CSF ELPH-IMP: ABNORMAL
OLIGOCLONAL BANDS CSF ELPH-IMP: POSITIVE
OLIGOCLONAL BANDS CSF IEF: 8 BANDS (ref 0–1)
SPECIMEN SOURCE: NORMAL

## 2019-05-28 ENCOUNTER — TELEPHONE (OUTPATIENT)
Dept: NEUROLOGY | Facility: CLINIC | Age: 64
End: 2019-05-28

## 2019-05-28 DIAGNOSIS — R93.7 ABNORMAL MRI, CERVICAL SPINE: ICD-10-CM

## 2019-05-28 DIAGNOSIS — R83.9 ABNORMAL FINDING IN CSF: ICD-10-CM

## 2019-05-28 DIAGNOSIS — R29.898 WEAKNESS OF RIGHT LOWER EXTREMITY: Primary | ICD-10-CM

## 2019-05-28 LAB
BACTERIA SPEC CULT: NO GROWTH
Lab: NORMAL
SPECIMEN SOURCE: NORMAL

## 2019-05-28 NOTE — TELEPHONE ENCOUNTER
Called and spoke to the patient about CSF results. Positive oligoclonal, bands found and elevated IgG intex. Referral to see one of our MS specialist. Patient is in agreement with the plan.

## 2019-05-28 NOTE — RESULT ENCOUNTER NOTE
Reviewed results and positive for oligoclonal. Spoke to the patient and referral to MS Clinic. Patient is in agreement.

## 2019-05-29 NOTE — TELEPHONE ENCOUNTER
RECORDS RECEIVED FROM: [facility]   DATE RECEIVED: 6.4.19   NOTES (FOR ALL VISITS) STATUS DETAILS   OFFICE NOTE from referring provider Internal 5.28.19 Shira Hancock, Neurology (telephone encounter)  5.10.19   OFFICE NOTE from other specialist N/A    DISCHARGE SUMMARY from hospital N/A    DISCHARGE REPORT from the ER N/A    OPERATIVE REPORT N/A    MEDICATION LIST Internal    IMAGING  (FOR ALL VISITS)     EMG N/A    EEG N/A    ECT N/A    MRI (HEAD, NECK, SPINE) Internal 5.13.19 MRI Thoracis spine  5.13.19 MR Lumbar Spine  5.7.19 MRI Cervical Spine  5.7.19 MRI Brain   LUMBAR PUNCTURE Internal 5.23.19 [facility]   JERI Scan N/A    CT (HEAD, NECK, SPINE) N/A

## 2019-06-04 ENCOUNTER — THERAPY VISIT (OUTPATIENT)
Dept: PHYSICAL THERAPY | Facility: CLINIC | Age: 64
End: 2019-06-04
Attending: NURSE PRACTITIONER
Payer: COMMERCIAL

## 2019-06-04 ENCOUNTER — PRE VISIT (OUTPATIENT)
Dept: NEUROLOGY | Facility: CLINIC | Age: 64
End: 2019-06-04

## 2019-06-04 ENCOUNTER — OFFICE VISIT (OUTPATIENT)
Dept: NEUROLOGY | Facility: CLINIC | Age: 64
End: 2019-06-04
Attending: NURSE PRACTITIONER
Payer: COMMERCIAL

## 2019-06-04 VITALS
BODY MASS INDEX: 25.99 KG/M2 | OXYGEN SATURATION: 99 % | HEIGHT: 65 IN | SYSTOLIC BLOOD PRESSURE: 136 MMHG | HEART RATE: 75 BPM | DIASTOLIC BLOOD PRESSURE: 82 MMHG | WEIGHT: 156 LBS

## 2019-06-04 DIAGNOSIS — R83.9 ABNORMAL FINDING IN CSF: ICD-10-CM

## 2019-06-04 DIAGNOSIS — R29.898 WEAKNESS OF RIGHT LOWER EXTREMITY: ICD-10-CM

## 2019-06-04 DIAGNOSIS — F40.240 CLAUSTROPHOBIA: ICD-10-CM

## 2019-06-04 DIAGNOSIS — R93.7 ABNORMAL MRI, CERVICAL SPINE: ICD-10-CM

## 2019-06-04 DIAGNOSIS — R26.9 GAIT DIFFICULTY: ICD-10-CM

## 2019-06-04 PROBLEM — S82.899A ANKLE FRACTURE: Status: RESOLVED | Noted: 2017-09-08 | Resolved: 2019-06-04

## 2019-06-04 PROCEDURE — G0463 HOSPITAL OUTPT CLINIC VISIT: HCPCS | Mod: ZF

## 2019-06-04 RX ORDER — DIAZEPAM 5 MG
TABLET ORAL
Qty: 2 TABLET | Refills: 0 | Status: SHIPPED | OUTPATIENT
Start: 2019-06-04 | End: 2020-01-31

## 2019-06-04 ASSESSMENT — ENCOUNTER SYMPTOMS
FATIGUE: 1
MUSCLE WEAKNESS: 1
TINGLING: 1
WEAKNESS: 1
NUMBNESS: 1

## 2019-06-04 ASSESSMENT — MIFFLIN-ST. JEOR: SCORE: 1253.49

## 2019-06-04 ASSESSMENT — PAIN SCALES - GENERAL: PAINLEVEL: NO PAIN (0)

## 2019-06-04 NOTE — DISCHARGE INSTRUCTIONS
06/04/19   - Try walking every day for at least 30 minutes, outside, on treadmill or mowing the lawn.

## 2019-06-04 NOTE — PROGRESS NOTES
06/04/19 0900   Quick Adds   Quick Adds Vestibular Eval   Type of Visit Initial OP PT Evaluation   General Information   Referring Physician MAHOGANY Garcia CNP    Orders Evaluate and Treat as Indicated   Order Date 05/10/19   Medical Diagnosis Gait difficulty, weakness of R LE    Onset of illness/injury or Date of Surgery 05/10/19   Surgical/Medical history reviewed Yes   Pertinent history of current problem (include personal factors and/or comorbidities that impact the POC) Current symptoms include: RLE weakness and hesitation, espeically while walking. Some days are worse than others. When I walk more than 1/2 mile it gets worse. Inc fatigue after walking over a mile around the late the leg doesn't want to lift up. MS - like symptoms. Periodically I have been having some dizziness. Comes and goes. A period of 2-4 weeks then goes away. Most recent one was 2 months ago. Lasted a whole month,. RLE has been getting worse for the past year. Hx of L ankle fx and had PT with that. When fatigued R ankle rolls out. Denies ringing in ears, feeling of fullness. Balance has gotten worse as well. When dizzy, balance is worse. Unable to run d/t lack of coordination. Current exercise routine: 5,000 steps/day - 30 min on treadmill and/or walking outside. Have hand weights, ankle weights, has wobble board.    Pertinent Visual History  mild glaucoma + astigmatism    Diagnostic Tests MRI;EMG;Other   MRI Results Results   MRI results Brain MRI normal: cervical MRI: hyperintense lesion d/t imflammatory process   EMG Results unremarkable   Other diagnostic tests Lumbar puncture: + oligocional bands, elevasted IgG intex   Previous/Current Treatment Physical Therapy   Improvement after PT Significant  (d/t L ankle fx )   Current Community Support Family/friend caregiver   Patient role/Employment history Retired   Living environment House/townhome   Home/Community Accessibility Comments Stairs to get in - inc difficulty when  fatigued - tends to trip going up the stairs    Assistive Devices Comments Sometimes uses walking stick.   Patient/Family Goals Statement I would like to get as strong as possible. I want to get back to hiking (4-5 miles) 2 is the max. Cutting the grass.    Fall Risk Screen   Fall screen completed by PT   Have you fallen 2 or more times in the past year? Yes   Have you fallen and had an injury in the past year? No   Timed Up and Go score (seconds) See FGA - worse when fatigued    Is patient a fall risk? Yes   Fall screen comments When fatigued, R WENDY rolled out and she fell. Not injured.    Pain   Patient currently in pain No   Cognitive Status Examination   Orientation orientation to person, place and time   Level of Consciousness alert   Follows Commands and Answers Questions 100% of the time   Personal Safety and Judgment intact   Memory intact   Integumentary   Integumentary No deficits were identified   Range of Motion (ROM)   ROM Comment Decreased L ankle pronation. Has hardware in   Strength   Strength Comments RLE: hip flex: 3+/5, knee flex/ext: 3/5, hip abd: 2+/5, hip ext: 4-/5, ankle df: 4/5, pedro luis: 4-/5    Bed Mobility   Bed Mobility Comments Independent with mat mobility    Gait   Gait Comments Pt ambulated with normal gait speed, step length and foot clearance. Reports inc in R ankle fatigue with longer durations of walking.    Gait Special Tests   Gait Special Tests 25 FOOT TIMED WALK;FUNCTIONAL GAIT ASSESSMENT   Gait Special Tests 25 Foot Timed Walk   Seconds 5.35   Steps 12 Steps   Gait Special Tests Functional Gait Assessment Score out of 30   Score out of 30 24   Balance Special Tests   Balance Special Tests Sit to stand reps;Single leg stance left;Single leg stance right   Balance Special Tests Single Leg Stance Right,   Right, seconds 60 Seconds   Balance Special Tests Single Leg Stance Left   Left, seconds 60 Seconds   Balance Special Tests Sit to Stand Reps in 30 Seconds   Reps in 30 seconds 22    Height Standard height chair    Comments no UE use, RPE: 8/10    Sensory Examination   Sensory Perception Comments Reports tingling/numbness in hands and feet: L 2-3 digits dec light touch. 4-5th digits in the fingers.    Coordination   Coordination no deficits were identified   Planned Therapy Interventions   Planned Therapy Interventions balance training;gait training;neuromuscular re-education;ROM;strengthening;stretching;transfer training   Clinical Impression   Criteria for Skilled Therapeutic Interventions Met yes, treatment indicated   PT Diagnosis R LE weakness    Influenced by the following impairments weakness, impaired activity tolerance, fatigue, imbalance    Functional limitations due to impairments community mobility, inc falls risk    Clinical Presentation Evolving/Changing   Clinical Presentation Rationale Progressing RLE weakness   Clinical Decision Making (Complexity) Low complexity   Therapy Frequency 1 time/week   Predicted Duration of Therapy Intervention (days/wks) up to 90 days    Risk & Benefits of therapy have been explained Yes   Patient, Family & other staff in agreement with plan of care Yes   Clinical Impression Comments Pt with progressive weakness of RLE, affecting her walking espeically longer distances. She will benefit from PT services in order to improve RLE strength and endurance and return to usual activites/level of functioning.    GOALS   PT Eval Goals 1;2;3;4   Goal 1   Goal Identifier LE strength   Goal Description Pt will score in normal range for all R LE strength MMT (4 or greater) in order to demonsrate improved strength for return to prior level of function    Target Date 09/01/19   Goal 2   Goal Identifier Gait    Goal Description Pt will report being able to walk up to 4 miles w/o R ankle instability or RLE fatigue in order to improve community mobility    Target Date 09/01/19   Goal 3   Goal Identifier HEP    Goal Description Pt will be independent in HEP in order  to improve LE strength, activity tolerance and balance    Target Date 09/01/19   Total Evaluation Time   PT Eval, Low Complexity Minutes (83091) 45

## 2019-06-04 NOTE — LETTER
6/4/2019       RE: Poornima Hilton  [address] [place] [address]     Dear Colleague,    Thank you for referring your patient, Poornima Hilton, to the [facility] MULTIPLE SCLEROSIS at [facility]. Please see a copy of my visit note below.    MULTIPLE SCLEROSIS CLINIC AT THE [facility]  FOLLOWUP/ESTABLISHED PATIENT VISIT      PRINCIPAL NEUROLOGIC DIAGNOSIS: Deferred    Date of Onset: 2000  Date of Diagnosis: NA  Initial Clinical Course: Relapsing Remitting  Current Clinical Course: Relapsing Remitting  Past Disease Modifying Therapy(ies): NA  Current Disease Modifying Therapy(ies):NA  Most Recent MRI of the Brain: 5/7/19  Most Recent MRI of the Cervical Cord 5/7/19  Most Recent MRI of the Thoracic Cord: 5/13/19  Most Recent Lumbar Puncture: 5/22/19 (RBC 1, WBC 1, Glucose 58, Protein 58,  8 OCB        CHIEF COMPLAINT: Review of diagnostic testing    HPI:The patient has a long a complex history. The patient reportedly was at her baseline level of health until 2000. At that time She developed numbness and tingling sensation in her feet and hands. She reports that her symptoms gradual ascending pattern.With the first episode of paresthesia, her toes and fingers were involved.  Her sensory symptoms stopped at the knee and shoulder area before a complete improvement.  States that it took  4-5 months for her symptoms to develop completely and 4-5 months for her symptoms to resolve. Her sensory symptoms resolved in a descending pattern. States that she had a  lumbar puncture in 2000, which was presumed normal.  States that her records from her first neurolog[place] not be available because of out of practice.  The patient has another episode of in 2008.  She was seen at the [facility] for the above-mentioned symptoms. Reported normal EMG. Her outside records from [facility] were available for review. Apparently, the patient had a  "normal brain MRI and cervical MRI in 2008.  Her imaging evaluation was for demyelinating disease, and there was no evidence of any demyelinating.  Essentially normal contrast brain and cervical spine MRI.  Her EMG report was on 02/05/2008.  The electrophysiological studies were normal with no evidence of polyneuropathy or left cervical radiculopathy on this study.  She describes that her symptoms have a symmetrical pattern.    Her second episode of paresthesia lasted for 1-2 months without any further progression and resolved.    Her third episode of numbness and tingling sensation in her toes and fingers has started 3 days ago.  She describes that her symptoms are in a symmetrical pattern.  They have staredt mainly in her 2nd-5th toes and spread to her upper plantar and partially top of her feet and curve to the outside of her feet in a letter \"C\" pattern. She does have a tingling sensation in her hands that mainly starts with her 5th digit outside of her palm area and stops at the wrist.  States that otherwise she is feeling good.  Her balance is good.  She works for the [employer], but denies any known exposure to chemicals or metals.  She does not recall any injuries.  Her neck does not appear to be concerning for her.  Her episode in 2008 lasted 1-2 months and evolved around her arms and did not progress.  She denies a history of falls.       Chadje patient presented to Dr. Hancock on 3/26/2014 for paraesthesia.. She jhad a EMG that was read as normal. The patient had a negative SSEP in the upper and lower extremities. The patient was lost to follow up until 4/8/2009. Patient reports that starting last fall (2018) she fell dizzy for 2 weeks and resolved but re-occurred. Patient reports that dizziness initially lasted for 2 weeks then resolved and then 2-3 weeks in October of 2018 and resolved. Patient reports that it re-occurred March 10th 2019 and resolved. Patient reports that she feels " "\"dizzy\" and \"off balance\" feeling. Reports that laying in the bed -not noticeable in March but was in the fall of 2018. Associated symptoms-none. Reports that in the fall dizziness would worse with tilting to the left but was not currently.    INTERVAL HISTORY:    The patient is getting mild headaches and mild episodes off dizziness. She reports that these eposodes are mild. She reports that she is still having itemittent leg weakness. She reports that this has been getting worse with exervice. She reports that this is getting worse 2wiht exercie. She reports that she will need to take the breaks. This has been getting slowly since January of last year.     Issues with current MS therapy: Not on DMT    Otherwise 10 point ROS was neg other than the symptoms noted above.    PAST HISTORY was reviewed and updated:      MEDICATIONS and ALLERGIES were reviewed and updated.    SOCIAL HISTORY was reviewed and updated:      EXAM:    PHYSICAL EXAMINATION:   VITAL SIGNS:  B/P: 136/82, T: Data Unavailable, P: 75, R: Data Unavailable    GENERAL: The patient is a well-nourished  who presents to the evaluation alone.  NEUROLOGIC:   MENTAL STATUS: Alert,awake and  oriented times four.   CRANIAL NERVES: . Visual fields are full to confrontation. The pupils are  round and react to light and there is no Tano Ta pupil. Extraocular movements are  intact with no  internuclear ophthalmoplegia. No nystagmus. Facial strength and sensation are  normal. Hearing is  normal. Palate elevation and tongue protrusion are  normal.   POWER:     Motor    Upper      Right Left   Shoulder Abduction 5 5   Elbow Flexion 5 5   Elbow Extension 5 5   Wrist Extension 5 5   Digit Extension 5 5   Digit Flexion 5 5   APB 5 5   Tone 0 0   Lower       Right Left   Hip Flexion 4 5   Knee Extension 5 5   Knee Flexion 5 5   Foot Dorsiflexion 5 5   Foot Plantar Flexion 5 5   EH 5 5   Toe Flexion 5 5   Tone 0 0           Grade Description   0 No increase in muscle " tone   1 Slight increase in muscle tone, manifested by a catch and release or by minimal resistance at the end of the range of motion when the affected part(s) is moved in flexion or extension   1+ Slight increase in muscle tone, manifested by a catch, followed by minimal resistance throughout the remainder (less than half) of the ROM   2 More marked increase in muscle tone through most of the ROM, but affected part(s) easily moved   3 Considerable increase in muscle tone, passive movement difficult   4 Affected part(s) rigid in flexion or extension           SENSORY:     Light touch:  moderately diminished in bilateral lower extremities , Pin Prick:  moderately diminished in bilateral lower extremities , Vibration:   Intact in all extremities, Prioperception:  Intact in all extremities and Temperature:  Intact in all extremities    REFLEXES:     Reflexes       Right  Left   Biceps 2  2   Triceps 2  2   Brachioradialis 2  2   Patellar  3  3   Achilles 3  2   Babinski up  withdrawal       MOTOR/CEREBELLAR:    Right Left   RRM 0 Normal 0 Normal   HECTOR 0 Normal 0 Normal   FTN 0 Normal 0 Normal   RRM 0 Normal 0 Normal   HKS 0 Normal 0 Normal           GAIT: Gait is   narrow-based and steady and the patient is  able to walk on heels, toes and in tandem without difficulty.    Romberg: Stable with eye(s) closed    RESULTS:  Monitoring labs:    Hospital Outpatient Visit on 05/23/2019   Component Date Value Ref Range Status     VDRL CSF 05/23/2019 Non Reactive  Non Reactive Final     Glucose CSF 05/23/2019 58  40 - 70 mg/dL Final     Copath Report 05/23/2019    Final                    Value:Patient Name: JULIUS CASTELLANOS  MR#: [id number]  Specimen #: [id number]  Collected: 5/23/2019 11:15  Received: 5/23/2019 15:05  Reported: 5/24/2019 10:51  Ordering Phy(s): YANA OTERO    For improved result formatting, select 'View Enhanced Report Format' under   Linked Documents  section.  _________________________________________    SPECIMEN(S):  Cerebrospinal fluid    INTERPRETATION:  Cerebrospinal fluid;       Rare to absent B cells       See comment    COMMENT:  There is no immunophenotypic evidence of B-cell non-Hodgkin lymphoma.   Neoplastic cells, including large cells,  may not survive specimen processing. The total number of cells is low   limiting the number of antibodies that  can be analyzed and limiting the interpretation. Only B-cell antigens were   evaluated. This sample may not be  representative. Final interpretation requires correlation with morphologic   and clinical features.    RESULTS:  Percentages reported below are based on the total number of CD45                           positive   viable leukocytes.  B cells are rare to absent    Resident/Fellow Review by:  Dr. Noel Mata    A resident/fellow in an ACGME accredited training program was involved in   the selection of testing, review of  flow scattergrams, and/or interpretation of this case. I, as the senior   physician, attest that I: (i)  confirmed appropriate testing, (ii) examined the relevant flow   scattergrams for the specimen(s); and (ii)  rendered or confirmed the interpretation(s).    ANTIBODIES:  Nine-color analyses are performed for the following markers: CD5, CD10,   CD19, CD20, CD34, CD38, CD45,and kappa  and lambda immunoglobulin light chains. Cells are gated to isolate   populations (CD45 versus side scatter and  forward scatter versus side scatter), to exclude debris (forward scatter   versus side scatter) and to exclude  cell doublets (forward scatter height versus forward scatter width and   side scatter height versus side scatter  width). Forward scatter varies with cell size. S                          lewis scatter varies with   the amount of cytoplasmic granules.  Intensity for CD45 usually increases as hematolymphoid cells mature.    CLINICAL HISTORY:  64 year old female with abnormal  cervical MRI hyperintense lesion and leg   weakness.    I have personally reviewed all specimens and/or slides, including the   listed special stains, and used them  with my medical judgment to determine the final diagnosis.    Electronically signed out by:    Elva Griffin M.D., [facility]    This test was developed and its performance characteristics determined by   [facility]. It has not been cleared or   approved by the US Food and Drug  Administration.  FDA does not require this test to go through premarket   FDA review. This test is used for  clinical purposes and should not be regarded as investigational or for   research.  This laboratory is certified  under the Clinical Laboratory Improvement Amendments (CLIA) as qualified   t                          o perform high complexity clinical  laboratory testing.    CPT Codes:  A: 82131-IJ, 72448-28-BQDL10(7), 80157-MFNJ9-60, 87933-79-IVQD38    TESTING LAB LOCATION:  [address] [address] [phone]    COLLECTION SITE:  Client:  [facility]  Location:  XRAY (S)       Lymes IgG CSF Immunoblot 05/23/2019 Negative  Negative Final     Lymes IgM CSF Immunoblot 05/23/2019 Negative  Negative Final     Protein Total CSF 05/23/2019 26  15 - 60 mg/dL Final     Immunoglobulin Serum IgG 05/23/2019 830  768 - 1,632 mg/dL Final     Oligoclonal IgG CSF 05/23/2019 2.2  0.0 - 6.0 mg/dL Final     Albumin by Nephelometry 05/23/2019 4,080  3,500 - 5,200 mg/dL Final     Oligoclonal Albumin CSF 05/23/2019 9  0 - 35 mg/dL Final     Oligoclonal Albumin Index 05/23/2019 2.2  0.0 - 9.0 ratio Final     Oligoclonal IgG Index 05/23/2019 1.20* 0.28 - 0.66 ratio Final     CSF IgG/Albumin Ratio 05/23/2019 0.24  0.09 - 0.25 ratio Final     Oligoclonal Banding 05/23/2019 Positive* Negative Final     CSF Oligoclonal Bands  Number 05/23/2019 8* 0 - 1 Bands Final     IgG Synthesis Rate 05/23/2019 3.4  <=8.0 mg/d Final     Oligoclonal Interpretation 05/23/2019 SEE NOTE   Final     Specimen Description 05/23/2019 Cerebrospinal fluid   Final     Special Requests 05/23/2019 TUBE 3   Preliminary     Culture Micro 05/23/2019 Culture negative after 1 week   Preliminary     Specimen Description 05/23/2019 Cerebrospinal fluid   Final     Special Requests 05/23/2019 TUBE 3   Final     Gram Stain 05/23/2019 No organisms seen   Final     Gram Stain 05/23/2019    Final                    Value:Rare  WBC'S seen       Gram Stain 05/23/2019 No PMNs seen   Final     Gram Stain 05/23/2019    Final                    Value:Gram stain review consistent with reported results.  Gram stain slide reviewed at the [facility]       Gram Stain 05/23/2019    Final                    Value:Quantification of host cells and microbiological organisms was done on a cytocentrifuged   preparation.       Specimen Description 05/23/2019 Cerebrospinal fluid   Final     Special Requests 05/23/2019 TUBE 3   Final     Culture Micro 05/23/2019 No growth   Final     Copath Report 05/23/2019    Final                    Value:Patient Name: JULIUS CASTELLANOS  MR#: [id number]  Specimen #: [id number]  Collected: 5/23/2019  Received: 5/24/2019  Reported: 5/24/2019 12:02  Ordering Phy(s): YANA OTERO    For improved result formatting, select 'View Enhanced Report Format' under   Linked Documents section.    SPECIMEN/STAIN PROCESS:  Cerebrospinal Fluid       Pap-Cyto x 1, Steel's stain-cyto x 1    ----------------------------------------------------------------  CYTOLOGIC INTERPRETATION:     Cerebrospinal Fluid:   Negative for malignancy  Specimen Adequacy: Satisfactory for evaluation.    I have personally reviewed all specimens and/or slides, including the   listed special stains, and used them  with my medical judgement to determine or  confirm the final diagnosis.    Electronically signed out by:    Abhinav Chow M.D.    CLINICAL HISTORY:  Hyperintense lesion and leg weakness.    ,    GROSS:  Cerebrospinal Fluid:  Received 0.8 ml of colorless, clear fluid, processed   as 1 Pap stained cytospin                           and 1  Steel stained cytospin.    MICROSCOPIC:  A microscopic examination is performed.    CPT Codes:  A: 28522-OZP, 62913-BMUZFVB    COLLECTION SITE:  Client:  [facility]  Location:  [facility] (S)    The technical component of this testing was completed at the [facility], with the professional component performed   at the [facility], [address] [place] [phone] ([phone])         WBC CSF 05/23/2019 1  0 - 5 /uL Final     RBC CSF 05/23/2019 1  0 - 2 /uL Final     Tube Number 05/23/2019 4  # Final     Color CSF 05/23/2019 Colorless  CLRL^Colorless Final     Appearance CSF 05/23/2019 Clear  CLER^Clear Final     Lyme CSF [place] 05/23/2019 0.01  <=0.99 GIANNI Final     Specimen Description 05/23/2019 Cerebrospinal fluid   Final     AFB Stain 05/23/2019 Negative for acid fast bacteria   Final     AFB Stain 05/23/2019 Assayed at [facility] [address] [facility], [place] [id number] [phone]   Final     ACE Angiotensin Conv Enz CSF 05/23/2019 1.5  0.0 - 2.5 U/L Final     Specimen Description 05/23/2019 Cerebrospinal fluid   Final     Culture Micro 05/23/2019    Preliminary                    Value:Culture received and in progress.  Positive AFB results are called as soon as detected.    Final report to follow in 7 to 8 weeks.       Culture Micro 05/23/2019 Assayed at [facility] [address] [place] [address] [phone]   Preliminary   Orders Only on 05/10/2019   Component Date Value Ref Range Status     Sodium 05/10/2019 138  133 - 144 mmol/L Final     Potassium 05/10/2019 3.5  3.4 - 5.3 mmol/L  Final     Chloride 05/10/2019 102  94 - 109 mmol/L Final     Carbon Dioxide 05/10/2019 28  20 - 32 mmol/L Final     Anion Gap 05/10/2019 8  3 - 14 mmol/L Final     Glucose 05/10/2019 87  70 - 99 mg/dL Final     Urea Nitrogen 05/10/2019 18  7 - 30 mg/dL Final     Creatinine 05/10/2019 0.86  0.52 - 1.04 mg/dL Final     GFR Estimate 05/10/2019 71  >60 mL/min/[1.73_m2] Final     GFR Estimate If Black 05/10/2019 82  >60 mL/min/[1.73_m2] Final     Calcium 05/10/2019 10.6* 8.5 - 10.1 mg/dL Final   Office Visit on 05/10/2019   Component Date Value Ref Range Status     Hemoglobin A1C 05/10/2019 5.7* 0 - 5.6 % Final     Vitamin E 05/10/2019 12.3  5.5 - 18.0 mg/L Final     Vitamin E Gamma 05/10/2019 1.3  0.0 - 6.0 mg/L Final     Vitamin B12 05/10/2019 1,269* 193 - 986 pg/mL Final     Methylmalonic Acid 05/10/2019 0.16  0.00 - 0.40 umol/L Final     Copper 05/10/2019 143  80 - 155 ug/dL Final     KD interpretation 05/10/2019 Negative  NEG^Negative Final     SSA (Ro) (LESLYE) Antibody, IgG 05/10/2019 <0.2  0.0 - 0.9 AI Final     SSB (La) (LESLYE) Antibody, IgG 05/10/2019 <0.2  0.0 - 0.9 AI Final   ]      MRI brain:    MRI Dated 5/7/19:  Mild to moderate  T2 disease burden with  0 enhancion lesion(s).       ASSESSMENT/PLAN:  The patient is a 64-year-old woman with a past medical history of multiple episodes of paresthesias and 2-year history of progressive lower extremity weakness who is coming as a consult to get an opinion on whether or not she could have underlying demyelinating.  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  Her symptoms had a time pattern that is highly suggestive of relapses back in 2004 2008 in 2014.  Ideally, if one can see the MRIs from the earlier time.  It would be far easier to evaluate for this possibility.  I requested that the patient try to get her MRIs from the [facility].  Realistically I do not know how possible that will  be.  The patient has one lesion in the posterior columns of the spinal cord she also has several lesions in the brain that look like the periventricular.  It would be ideal to get an flair MRI of the sagittal plane to better than not actually truly have periventricular.  The patient does have been periventricular with her history and I will and her spinal cord lesion, the diagnosis of multiple sclerosis.  At this point.  Given I think patient probably did not have MS, other consideration could be given Biotene and other demyelinating.  I will follow the patient up after she developed a frequent MRI in 1 week.  I advised the patient to call my office.    Get MRI of the brain  Follow-up in 1 week      I spent 45 minutes in this visit, with >50% direct patient time spent counseling about prognosis, treatment options, and coordination of care.    Omar Francis MD [facility]  Staff Neurologist   06/04/19       (Chart documentation was completed in part with Dragon voice-recognition software. Even though reviewed, some grammatical, spelling, and word errors may remain.)

## 2019-06-10 LAB
ALB CSF/SERPL: NORMAL {RATIO}
ALBUMIN CSF-MCNC: NORMAL MG/DL
ALBUMIN SERPL-MCNC: NORMAL G/DL
IGG CSF-MCNC: NORMAL MG/DL
IGG SERPL-MCNC: NORMAL MG/DL
IGG SYNTH RATE SER+CSF CALC-MRATE: NORMAL MG/(24.H)
IGG/ALB CLEAR SER+CSF-RTO: NORMAL
IGG/ALB CSF: NORMAL {RATIO}

## 2019-06-11 ENCOUNTER — OFFICE VISIT (OUTPATIENT)
Dept: NEUROLOGY | Facility: CLINIC | Age: 64
End: 2019-06-11
Attending: PSYCHIATRY & NEUROLOGY
Payer: COMMERCIAL

## 2019-06-11 ENCOUNTER — HOSPITAL ENCOUNTER (OUTPATIENT)
Dept: MRI IMAGING | Facility: CLINIC | Age: 64
Discharge: HOME | End: 2019-06-11
Attending: PSYCHIATRY & NEUROLOGY | Admitting: PSYCHIATRY & NEUROLOGY
Payer: COMMERCIAL

## 2019-06-11 VITALS
BODY MASS INDEX: 25.99 KG/M2 | OXYGEN SATURATION: 97 % | WEIGHT: 156 LBS | SYSTOLIC BLOOD PRESSURE: 137 MMHG | HEIGHT: 65 IN | HEART RATE: 86 BPM | DIASTOLIC BLOOD PRESSURE: 82 MMHG

## 2019-06-11 DIAGNOSIS — R93.7 ABNORMAL MRI, CERVICAL SPINE: ICD-10-CM

## 2019-06-11 DIAGNOSIS — G35 MS (MULTIPLE SCLEROSIS) (H): Primary | ICD-10-CM

## 2019-06-11 PROCEDURE — G0463 HOSPITAL OUTPT CLINIC VISIT: HCPCS | Mod: ZF

## 2019-06-11 PROCEDURE — 70551 MRI BRAIN STEM W/O DYE: CPT

## 2019-06-11 ASSESSMENT — MIFFLIN-ST. JEOR: SCORE: 1253.49

## 2019-06-11 ASSESSMENT — PAIN SCALES - GENERAL: PAINLEVEL: NO PAIN (0)

## 2019-06-11 NOTE — PATIENT INSTRUCTIONS
Consider alpha lipoic acid 1200mg daily    Continue physical therapy    Follow up in 4 months    You saw a neurology provider today at the [facility].  You may have also met with the MS RN Care Coordinator.  In order to get a message to your MS Center provider, you should contact [phone] option [phone] for the triage nurse line.    You should contact us via this protocol if you have any of the following symptoms:    New or worsening neurologic symptoms that persist for 24-48 hours, such as:  o New onset of pain or marked worsening of pain  o Difficulty with speech, swallowing, or breathing  o New onset of vertigo or dizziness  o Change in bowel or bladder function (incontinence, difficulty urinating)    Increasing difficulty in self care    Marked changes in vision (double vision, blurred vision, graying of vision)    Change in mobility    Change in cognitive function    Falling    Worsening numbness, tingling or pain with a change in function    Worsening fatigue lasting more than 2 weeks  If you had labs completed today, we will contact you with the results.  If you are active in [facility], they will be released to you there.  Otherwise, your results will be provided to you via mail or telephone call.  Some results take up to 2 weeks for completion.  If you haven t heard anything about your lab results within 2 weeks, you can call or send a [facility] message to obtain your results.  If you have an MRI scheduled in the week or two prior to your next appointment, we will go over the results at your scheduled follow up appointment.  If you are not scheduled to see your MS Center provider within about 2 weeks after your MRI, please call or send a [facility] message to obtain your results if you haven t heard anything within 2 weeks.  Please be aware that it takes at least 5 business days after routine MRIs for your results to be reviewed by both the radiologist and your doctor.  MRIs  completed at facilities outside of the [facility] system take about 2 weeks in order for the MRI disc to be mailed to our clinic and uploaded into your medical record.    Prescription refills should be faxed to us by your pharmacy.  Our fax number for prescription refills is [phone].  Please do your best to come to your appointments, and to arrive 15 minutes early to allow time for checking in.  [facility] reserves the right to terminate care of established patients if a patient misses three or more appointments in a clinic without providing notification within a 12-month period.    Developing Your Care Team  Individuals living with chronic illnesses like MS may be unaware that they are at risk for the same range of medical problems as everyone else.  This is why you must establish a relationship with other health care providers in addition to your Multiple Sclerosis doctor.  It can be difficult at times to figure out whether a health concern is related to your MS, or whether it is related to something else, such as hormonal changes, pseduoexacerbations, changes in your core body temperature, flu-like reactions to interferons, exercise, or infections.  Urinary tract infections (UTIs) are common culprits that can cause fatigue, weakness, or other  MS attack -like symptoms without classic symptoms of a UTI.  For this reason, if you call or come in to discuss symptoms, you may be asked to get in touch with your primary provider or another specialist, so that you receive the comprehensive care you need.  What is Multiple Sclerosis (MS)?  MS is a disease in which the nerve tissues in the brain and/or spinal cord are attacked by immune cells in the body.  These immune cells are present in everyone, and their normal role is to fight off infections.  In people with MS, these cells change the way they function and cross into the nervous system.  Once there, they cause inflammation that damages the  myelin (or the protective coating of a nerve cell, much like the plastic covering on an electrical cord) and parts of the nerve cell itself.  So far, a clear cause for this immune system dysfunction has not been found.  MS often starts out as the  relapsing-remitting  form.  This means there are episodes when you have symptoms, and other times when you recover to normal or near-normal.  Over time, if the damage to the nervous system continues, the disease can cause additional disability, such as difficulty walking.  If the relapses and nerve damage can be prevented with available medications, many patients with MS can go many years between relapses and have relatively little disability.  Remember: MS is a condition that changes and must be evaluated on an ongoing basis!  What is a Relapse? (Also called flare-ups, attacks, or exacerbations)  Relapses are due to the occurrence of inflammation in some part of the brain and/or spinal cord.  A relapse is new or recurrent symptoms which persist for at least 24 hours and sometimes worsen over 48 hours.  New symptoms need to be  by at least 1 month in order to be considered separate relapses. Most of the time, symptoms reach their maximal intensity within 2 weeks and then begin to slowly resolve.  At times, your symptoms may not recover fully for up to 6 months, depending on the severity of the episode.  The frequency of relapses is generally higher early in the disease, but can vary greatly among individuals with MS.  Improvement of symptoms for an individual is unpredictable with each relapse.    It is important to remember that an increase in symptoms and changes in function may not necessarily be a relapse.  There are other factors that contribute to such changes, such as hot weather, increased body temperature, infection/illness, stress and sleep deprivation.  The worsening of symptoms may feel like a relapse when in reality it is not.  These episodes are  referred to as pseudorelapses.  Once the underlying cause is addressed, symptoms usually fade away and you feel better.  If you experience a worsening of symptoms that lasts more than 48 hours and does not improve with cooling down, decreasing stress, or treatment of an infection, please call us and we can help to better determine whether you are having a pseudorelapse versus a relapse.

## 2019-06-11 NOTE — LETTER
6/11/2019       RE: Poornima Hilton  [address] [place] [address]     Dear Colleague,    Thank you for referring your patient, Poornima Hilton, to the [facility] MULTIPLE SCLEROSIS at [facility]. Please see a copy of my visit note below.    MULTIPLE SCLEROSIS CLINIC AT THE [facility]  FOLLOWUP/ESTABLISHED PATIENT VISIT      PRINCIPAL NEUROLOGIC DIAGNOSIS: Multiple Sclerosis    Date of Onset: 2000  Date of Diagnosis: NA  Initial Clinical Course: Relapsing Remitting  Current Clinical Course: Relapsing Remitting  Past Disease Modifying Therapy(ies): NA  Current Disease Modifying Therapy(ies):NA  Most Recent MRI of the Brain:  6/11/19  Most Recent MRI of the Cervical Cord 5/7/19  Most Recent MRI of the Thoracic Cord: 5/13/19  Most Recent Lumbar Puncture: 5/22/19 (RBC 1, WBC 1, Glucose 58, Protein 58,  8 OCB    CHIEF COMPLAINT: Review of diagnostic testing    INTERVAL HISTORY:    Overall she reports that she is mildly improved with physical therapy    Issues with current MS therapy: Not on DMT    REVIEW OF SYSTEMS:    Mood: unchanged  Spasticity:unchanged  Bladder: frequency and urgency   Bowel: unchanged  Pain related to today's visit:reviewed on nursing intake documentation  Fatigue: unchanged  Sleep: well/ no problem  Memory/Concentration: unchanged    Otherwise 10 point ROS was neg other than the symptoms noted above.    PAST HISTORY was reviewed and updated:      MEDICATIONS and ALLERGIES were reviewed and updated.    SOCIAL HISTORY was reviewed and updated:      EXAM:    PHYSICAL EXAMINATION:   VITAL SIGNS:  B/P: 137/82, T: Data Unavailable, P: 86, R: Data Unavailable    GENERAL: The patient is a well-nourished  who presents to the evaluation alone.  NEUROLOGIC:   MENTAL STATUS: Alert,awake and  oriented times four.   CRANIAL NERVES:. Facial strength and sensation are  normal. Hearing is  normal.     GAIT: Gait is   narrow-based and steady and the patient is   able to walk on heels, toes and in tandem without difficulty.    Romberg: Stable with eye(s) closed    RESULTS:  Monitoring labs:    Hospital Outpatient Visit on 05/23/2019   Component Date Value Ref Range Status     Immunoglobulin G 05/23/2019 Canceled, Test credited   Final     Immunoglobulin G CSF 05/23/2019 Canceled, Test credited   Final     Immunoglobulin G CSF Index 05/23/2019 Canceled, Test credited   Final     Albumin by Nephelometry 05/23/2019 Canceled, Test credited   Final     Immunoglobulin G Albumin CSF 05/23/2019 Canceled, Test credited   Final     Immunoglobulin G Albumin Index 05/23/2019 Canceled, Test credited   Final     CSF Synthesis Rate 05/23/2019 Canceled, Test credited   Final     IgG Albumin Ration 05/23/2019 Canceled, Test credited   Final     VDRL CSF 05/23/2019 Non Reactive  Non Reactive Final     Glucose CSF 05/23/2019 58  40 - 70 mg/dL Final     Copath Report 05/23/2019    Final                    Value:Patient Name: JULIUS CASTELLANOS  MR#: [id number]  Specimen #: [id number]  Collected: 5/23/2019 11:15  Received: 5/23/2019 15:05  Reported: 5/24/2019 10:51  Ordering Phy(s): YANA OTERO    For improved result formatting, select 'View Enhanced Report Format' under   Linked Documents section.  _________________________________________    SPECIMEN(S):  Cerebrospinal fluid    INTERPRETATION:  Cerebrospinal fluid;       Rare to absent B cells       See comment    COMMENT:  There is no immunophenotypic evidence of B-cell non-Hodgkin lymphoma.   Neoplastic cells, including large cells,  may not survive specimen processing. The total number of cells is low   limiting the number of antibodies that  can be analyzed and limiting the interpretation. Only B-cell antigens were   evaluated. This sample may not be  representative. Final interpretation requires correlation with morphologic   and clinical features.    RESULTS:  Percentages reported below are based on the total number of CD45                            positive   viable leukocytes.  B cells are rare to absent    Resident/Fellow Review by:  Dr. Noel Mata    A resident/fellow in an ACGME accredited training program was involved in   the selection of testing, review of  flow scattergrams, and/or interpretation of this case. I, as the senior   physician, attest that I: (i)  confirmed appropriate testing, (ii) examined the relevant flow   scattergrams for the specimen(s); and (ii)  rendered or confirmed the interpretation(s).    ANTIBODIES:  Nine-color analyses are performed for the following markers: CD5, CD10,   CD19, CD20, CD34, CD38, CD45,and kappa  and lambda immunoglobulin light chains. Cells are gated to isolate   populations (CD45 versus side scatter and  forward scatter versus side scatter), to exclude debris (forward scatter   versus side scatter) and to exclude  cell doublets (forward scatter height versus forward scatter width and   side scatter height versus side scatter  width). Forward scatter varies with cell size. S                          lewis scatter varies with   the amount of cytoplasmic granules.  Intensity for CD45 usually increases as hematolymphoid cells mature.    CLINICAL HISTORY:  64 year old female with abnormal cervical MRI hyperintense lesion and leg   weakness.    I have personally reviewed all specimens and/or slides, including the   listed special stains, and used them  with my medical judgment to determine the final diagnosis.    Electronically signed out by:    Elva Griffin M.D., [facility]    This test was developed and its performance characteristics determined by   [facility], [facility]. It has not been cleared or   approved by the US Food and Drug  Administration.  FDA does not require this test to go through premarket   FDA review. This test is used for  clinical purposes and should not be regarded as investigational or for   research.  This  laboratory is certified  under the Clinical Laboratory Improvement Amendments (CLIA) as qualified   t                          o perform high complexity clinical  laboratory testing.    CPT Codes:  A: 88870-PM, 86375-32-BYLC59(7), 39226-SMBK3-04, 81648-39-OKZT28    TESTING LAB LOCATION:  [address] [place] [id number] [phone]    COLLECTION SITE:  Client:  [facility]  Location:  [facility] (S)       Lymes IgG CSF Immunoblot 05/23/2019 Negative  Negative Final     Lymes IgM CSF Immunoblot 05/23/2019 Negative  Negative Final     Protein Total CSF 05/23/2019 26  15 - 60 mg/dL Final     Immunoglobulin Serum IgG 05/23/2019 830  768 - 1,632 mg/dL Final     Oligoclonal IgG CSF 05/23/2019 2.2  0.0 - 6.0 mg/dL Final     Albumin by Nephelometry 05/23/2019 4,080  3,500 - 5,200 mg/dL Final     Oligoclonal Albumin CSF 05/23/2019 9  0 - 35 mg/dL Final     Oligoclonal Albumin Index 05/23/2019 2.2  0.0 - 9.0 ratio Final     Oligoclonal IgG Index 05/23/2019 1.20* 0.28 - 0.66 ratio Final     CSF IgG/Albumin Ratio 05/23/2019 0.24  0.09 - 0.25 ratio Final     Oligoclonal Banding 05/23/2019 Positive* Negative Final     CSF Oligoclonal Bands Number 05/23/2019 8* 0 - 1 Bands Final     IgG Synthesis Rate 05/23/2019 3.4  <=8.0 mg/d Final     Oligoclonal Interpretation 05/23/2019 SEE NOTE   Final     Specimen Description 05/23/2019 Cerebrospinal fluid   Final     Special Requests 05/23/2019 TUBE 3   Preliminary     Culture Micro 05/23/2019 Culture negative after 2 weeks   Preliminary     Specimen Description 05/23/2019 Cerebrospinal fluid   Final     Special Requests 05/23/2019 TUBE 3   Final     Gram Stain 05/23/2019 No organisms seen   Final     Gram Stain 05/23/2019    Final                    Value:Rare  WBC'S seen       Gram Stain 05/23/2019 No PMNs seen   Final     Gram Stain 05/23/2019    Final                    Value:Gram stain review  consistent with reported results.  Gram stain slide reviewed at the [facility]       Gram Stain 05/23/2019    Final                    Value:Quantification of host cells and microbiological organisms was done on a cytocentrifuged   preparation.       Specimen Description 05/23/2019 Cerebrospinal fluid   Final     Special Requests 05/23/2019 TUBE 3   Final     Culture Micro 05/23/2019 No growth   Final     Copath Report 05/23/2019    Final                    Value:Patient Name: JULIUS CASTELLANOS  MR#: [id number]  Specimen #: [id number]  Collected: 5/23/2019  Received: 5/24/2019  Reported: 5/24/2019 12:02  Ordering Phy(s): YANA OTERO    For improved result formatting, select 'View Enhanced Report Format' under   Linked Documents section.    SPECIMEN/STAIN PROCESS:  Cerebrospinal Fluid       Pap-Cyto x 1, Steel's stain-cyto x 1    ----------------------------------------------------------------  CYTOLOGIC INTERPRETATION:     Cerebrospinal Fluid:   Negative for malignancy  Specimen Adequacy: Satisfactory for evaluation.    I have personally reviewed all specimens and/or slides, including the   listed special stains, and used them  with my medical judgement to determine or confirm the final diagnosis.    Electronically signed out by:    Abhinav Chow M.D.    CLINICAL HISTORY:  Hyperintense lesion and leg weakness.    ,    GROSS:  Cerebrospinal Fluid:  Received 0.8 ml of colorless, clear fluid, processed   as 1 Pap stained cytospin                           and 1  Steel stained cytospin.    MICROSCOPIC:  A microscopic examination is performed.    CPT Codes:  A: 44135-KDZ, 00047-DHLOPLT    COLLECTION SITE:  Client:  [facility]  Location:  [facility] (S)    The technical component of this testing was completed at the [facility], with the professional component performed   at the [facility]  [facility]  Laboratory, [address] [place] [address] ([phone])         WBC CSF 05/23/2019 1  0 - 5 /uL Final     RBC CSF 05/23/2019 1  0 - 2 /uL Final     Tube Number 05/23/2019 4  # Final     Color CSF 05/23/2019 Colorless  CLRL^Colorless Final     Appearance CSF 05/23/2019 Clear  CLER^Clear Final     Lyme CSF [place] 05/23/2019 0.01  <=0.99 GIANNI Final     Specimen Description 05/23/2019 Cerebrospinal fluid   Final     AFB Stain 05/23/2019 Negative for acid fast bacteria   Final     AFB Stain 05/23/2019 Assayed at [facility] [address] [facility], [place] [id number] [phone]   Final     ACE Angiotensin Conv Enz CSF 05/23/2019 1.5  0.0 - 2.5 U/L Final     Specimen Description 05/23/2019 Cerebrospinal fluid   Final     Culture Micro 05/23/2019    Preliminary                    Value:Culture received and in progress.  Positive AFB results are called as soon as detected.    Final report to follow in 7 to 8 weeks.       Culture Micro 05/23/2019 Assayed at [employer] [address] [place] [address] [phone]   Preliminary   Orders Only on 05/10/2019   Component Date Value Ref Range Status     Sodium 05/10/2019 138  133 - 144 mmol/L Final     Potassium 05/10/2019 3.5  3.4 - 5.3 mmol/L Final     Chloride 05/10/2019 102  94 - 109 mmol/L Final     Carbon Dioxide 05/10/2019 28  20 - 32 mmol/L Final     Anion Gap 05/10/2019 8  3 - 14 mmol/L Final     Glucose 05/10/2019 87  70 - 99 mg/dL Final     Urea Nitrogen 05/10/2019 18  7 - 30 mg/dL Final     Creatinine 05/10/2019 0.86  0.52 - 1.04 mg/dL Final     GFR Estimate 05/10/2019 71  >60 mL/min/[1.73_m2] Final     GFR Estimate If Black 05/10/2019 82  >60 mL/min/[1.73_m2] Final     Calcium 05/10/2019 10.6* 8.5 - 10.1 mg/dL Final   Office Visit on 05/10/2019   Component Date Value Ref Range Status     Hemoglobin A1C 05/10/2019 5.7* 0 - 5.6 % Final     Vitamin E 05/10/2019 12.3  5.5 - 18.0 mg/L Final     Vitamin E Gamma 05/10/2019 1.3  0.0 -  6.0 mg/L Final     Vitamin B12 05/10/2019 1,269* 193 - 986 pg/mL Final     Methylmalonic Acid 05/10/2019 0.16  0.00 - 0.40 umol/L Final     Copper 05/10/2019 143  80 - 155 ug/dL Final     KD interpretation 05/10/2019 Negative  NEG^Negative Final     SSA (Ro) (LESLYE) Antibody, IgG 05/10/2019 <0.2  0.0 - 0.9 AI Final     SSB (La) (LESLYE) Antibody, IgG 05/10/2019 <0.2  0.0 - 0.9 AI Final       MRI brain:    MRI Dated 6/11/19:  Mild to moderate  T2 disease burden with  0 enhancion lesion(s).       ASSESSMENT/PLAN:  The patient is a 64-year-old woman with past medical history of newly diagnosed multiple sclerosis who is presenting today as a follow-up.  Given her history, her imaging, her CSF analysis, and her exam the diagnosis of sclerosis is now secured.  While it is difficult to say her diagnosis is relapsing remitting or secondary progressive, there is no activity on her current MRI.  Consequently, I would not favor starting a disease modifying therapy and her at this time.  I spent a prolonged period of time answering several questions with the patient.  I explained to her how multiple sclerosis causes disability.  I also explained how people progress with the disease.  I also explained the rate in which people develop walking problems over time.  I advised the patient on the uses of alpha lipoic acid and biotin.  I warned her of the risk and benefits of this.  I also reviewed several other potential therapies for her I also reviewed Mayzent.  I told the patient that probably recommend against this medication at this time.  I advised the patient that continuing to exercise is probably the best thing for her at this point.  Ice to the the heart healthy diet.  I will tentatively plan to follow the patient up in 4 months after the atriums conference.  I instructed her to call my office with any questions, concerns, issues or problems    Follow-up in 4 months    I spent 25 minutes in this visit, with >50% direct patient  time spent counseling about prognosis, treatment options, and coordination of care.    Omar Francis MD [facility]  Staff Neurologist   06/11/19     (Chart documentation was completed in part with Dragon voice-recognition software. Even though reviewed, some grammatical, spelling, and word errors may remain.)

## 2019-06-11 NOTE — PROGRESS NOTES
MULTIPLE SCLEROSIS CLINIC AT THE [facility]  FOLLOWUP/ESTABLISHED PATIENT VISIT      PRINCIPAL NEUROLOGIC DIAGNOSIS: Multiple Sclerosis    Date of Onset: 2000  Date of Diagnosis: NA  Initial Clinical Course: Relapsing Remitting  Current Clinical Course: Relapsing Remitting  Past Disease Modifying Therapy(ies): NA  Current Disease Modifying Therapy(ies):NA  Most Recent MRI of the Brain: 6/11/19  Most Recent MRI of the Cervical Cord 5/7/19  Most Recent MRI of the Thoracic Cord: 5/13/19  Most Recent Lumbar Puncture: 5/22/19 (RBC 1, WBC 1, Glucose 58, Protein 58,  8 OCB      CHIEF COMPLAINT: Review of diagnostic testing      INTERVAL HISTORY:    Overall she reports that she is mildly improved with physical therapy    Issues with current MS therapy: Not on DMT    REVIEW OF SYSTEMS:    Mood: unchanged  Spasticity:unchanged  Bladder: frequency and urgency   Bowel: unchanged  Pain related to today's visit:reviewed on nursing intake documentation  Fatigue: unchanged  Sleep: well/ no problem  Memory/Concentration: unchanged      Otherwise 10 point ROS was neg other than the symptoms noted above.    PAST HISTORY was reviewed and updated:      MEDICATIONS and ALLERGIES were reviewed and updated.    SOCIAL HISTORY was reviewed and updated:        EXAM:    PHYSICAL EXAMINATION:   VITAL SIGNS:  B/P: 137/82, T: Data Unavailable, P: 86, R: Data Unavailable    GENERAL: The patient is a well-nourished  who presents to the evaluation alone.  NEUROLOGIC:   MENTAL STATUS: Alert,awake and  oriented times four.   CRANIAL NERVES:. Facial strength and sensation are  normal. Hearing is  normal.           GAIT: Gait is   narrow-based and steady and the patient is  able to walk on heels, toes and in tandem without difficulty.    Romberg: Stable with eye(s) closed    RESULTS:  Monitoring labs:    Hospital Outpatient Visit on 05/23/2019   Component Date Value Ref Range Status     Immunoglobulin G 05/23/2019 Canceled, Test credited    Final     Immunoglobulin G CSF 05/23/2019 Canceled, Test credited   Final     Immunoglobulin G CSF Index 05/23/2019 Canceled, Test credited   Final     Albumin by Nephelometry 05/23/2019 Canceled, Test credited   Final     Immunoglobulin G Albumin CSF 05/23/2019 Canceled, Test credited   Final     Immunoglobulin G Albumin Index 05/23/2019 Canceled, Test credited   Final     CSF Synthesis Rate 05/23/2019 Canceled, Test credited   Final     IgG Albumin Ration 05/23/2019 Canceled, Test credited   Final     VDRL CSF 05/23/2019 Non Reactive  Non Reactive Final     Glucose CSF 05/23/2019 58  40 - 70 mg/dL Final     Copath Report 05/23/2019    Final                    Value:Patient Name: JULIUS CASTELLANOS  MR#: [id number]  Specimen #: [id number]  Collected: 5/23/2019 11:15  Received: 5/23/2019 15:05  Reported: 5/24/2019 10:51  Ordering Phy(s): YANA OTERO    For improved result formatting, select 'View Enhanced Report Format' under   Linked Documents section.  _________________________________________    SPECIMEN(S):  Cerebrospinal fluid    INTERPRETATION:  Cerebrospinal fluid;       Rare to absent B cells       See comment    COMMENT:  There is no immunophenotypic evidence of B-cell non-Hodgkin lymphoma.   Neoplastic cells, including large cells,  may not survive specimen processing. The total number of cells is low   limiting the number of antibodies that  can be analyzed and limiting the interpretation. Only B-cell antigens were   evaluated. This sample may not be  representative. Final interpretation requires correlation with morphologic   and clinical features.    RESULTS:  Percentages reported below are based on the total number of CD45                           positive   viable leukocytes.  B cells are rare to absent    Resident/Fellow Review by:  Dr. Noel Mata    A resident/fellow in an ACGME accredited training program was involved in   the selection of testing, review of  flow scattergrams,  and/or interpretation of this case. I, as the senior   physician, attest that I: (i)  confirmed appropriate testing, (ii) examined the relevant flow   scattergrams for the specimen(s); and (ii)  rendered or confirmed the interpretation(s).    ANTIBODIES:  Nine-color analyses are performed for the following markers: CD5, CD10,   CD19, CD20, CD34, CD38, CD45,and kappa  and lambda immunoglobulin light chains. Cells are gated to isolate   populations (CD45 versus side scatter and  forward scatter versus side scatter), to exclude debris (forward scatter   versus side scatter) and to exclude  cell doublets (forward scatter height versus forward scatter width and   side scatter height versus side scatter  width). Forward scatter varies with cell size. S                          lewis scatter varies with   the amount of cytoplasmic granules.  Intensity for CD45 usually increases as hematolymphoid cells mature.    CLINICAL HISTORY:  64 year old female with abnormal cervical MRI hyperintense lesion and leg   weakness.    I have personally reviewed all specimens and/or slides, including the   listed special stains, and used them  with my medical judgment to determine the final diagnosis.    Electronically signed out by:    Elva Griffin M.D., [facility]    This test was developed and its performance characteristics determined by   [facility], [facility]. It has not been cleared or   approved by the US Food and Drug  Administration.  FDA does not require this test to go through premarket   FDA review. This test is used for  clinical purposes and should not be regarded as investigational or for   research.  This laboratory is certified  under the Clinical Laboratory Improvement Amendments (CLIA) as qualified   t                          o perform high complexity clinical  laboratory testing.    CPT Codes:  A: 38019-FS, 60106-30-FIQU82(7), 91823-EPNT0-89,  11294-21-MJJB74    TESTING LAB LOCATION:  [facility]  [address], [facility] [address] [place] [address] [phone]    COLLECTION SITE:  Client:  [facility]  Location:  [facility] (S)       Lymes IgG CSF Immunoblot 05/23/2019 Negative  Negative Final     Lymes IgM CSF Immunoblot 05/23/2019 Negative  Negative Final     Protein Total CSF 05/23/2019 26  15 - 60 mg/dL Final     Immunoglobulin Serum IgG 05/23/2019 830  768 - 1,632 mg/dL Final     Oligoclonal IgG CSF 05/23/2019 2.2  0.0 - 6.0 mg/dL Final     Albumin by Nephelometry 05/23/2019 4,080  3,500 - 5,200 mg/dL Final     Oligoclonal Albumin CSF 05/23/2019 9  0 - 35 mg/dL Final     Oligoclonal Albumin Index 05/23/2019 2.2  0.0 - 9.0 ratio Final     Oligoclonal IgG Index 05/23/2019 1.20* 0.28 - 0.66 ratio Final     CSF IgG/Albumin Ratio 05/23/2019 0.24  0.09 - 0.25 ratio Final     Oligoclonal Banding 05/23/2019 Positive* Negative Final     CSF Oligoclonal Bands Number 05/23/2019 8* 0 - 1 Bands Final     IgG Synthesis Rate 05/23/2019 3.4  <=8.0 mg/d Final     Oligoclonal Interpretation 05/23/2019 SEE NOTE   Final     Specimen Description 05/23/2019 Cerebrospinal fluid   Final     Special Requests 05/23/2019 TUBE 3   Preliminary     Culture Micro 05/23/2019 Culture negative after 2 weeks   Preliminary     Specimen Description 05/23/2019 Cerebrospinal fluid   Final     Special Requests 05/23/2019 TUBE 3   Final     Gram Stain 05/23/2019 No organisms seen   Final     Gram Stain 05/23/2019    Final                    Value:Rare  WBC'S seen       Gram Stain 05/23/2019 No PMNs seen   Final     Gram Stain 05/23/2019    Final                    Value:Gram stain review consistent with reported results.  Gram stain slide reviewed at the [facility]       Gram Stain 05/23/2019    Final                    Value:Quantification of host cells and microbiological organisms was done  on a cytocentrifuged   preparation.       Specimen Description 05/23/2019 Cerebrospinal fluid   Final     Special Requests 05/23/2019 TUBE 3   Final     Culture Micro 05/23/2019 No growth   Final     Copath Report 05/23/2019    Final                    Value:Patient Name: JULIUS CASTELLANOS  MR#: [id number]  Specimen #: [id number]  Collected: 5/23/2019  Received: 5/24/2019  Reported: 5/24/2019 12:02  Ordering Phy(s): YANA OTERO    For improved result formatting, select 'View Enhanced Report Format' under   Linked Documents section.    SPECIMEN/STAIN PROCESS:  Cerebrospinal Fluid       Pap-Cyto x 1, Steel's stain-cyto x 1    ----------------------------------------------------------------  CYTOLOGIC INTERPRETATION:     Cerebrospinal Fluid:   Negative for malignancy  Specimen Adequacy: Satisfactory for evaluation.    I have personally reviewed all specimens and/or slides, including the   listed special stains, and used them  with my medical judgement to determine or confirm the final diagnosis.    Electronically signed out by:    Abhinav Chow M.D.    CLINICAL HISTORY:  Hyperintense lesion and leg weakness.    ,    GROSS:  Cerebrospinal Fluid:  Received 0.8 ml of colorless, clear fluid, processed   as 1 Pap stained cytospin                           and 1  Steel stained cytospin.    MICROSCOPIC:  A microscopic examination is performed.    CPT Codes:  A: 76064-VUX, 98557-FAQFSWJ    COLLECTION SITE:  Client:  [facility]  Location:  [facility] (S)    The technical component of this testing was completed at the [facility], with the professional component performed   at the [facility], [address] [place] [phone] ([phone])         WBC CSF 05/23/2019 1  0 - 5 /uL Final     RBC CSF 05/23/2019 1  0 - 2 /uL Final     Tube Number 05/23/2019 4  # Final     Color CSF 05/23/2019 Colorless   CLRL^Colorless Final     Appearance CSF 05/23/2019 Clear  CLER^Clear Final     Lyme CSF [place]uy 05/23/2019 0.01  <=0.99 GIANNI Final     Specimen Description 05/23/2019 Cerebrospinal fluid   Final     AFB Stain 05/23/2019 Negative for acid fast bacteria   Final     AFB Stain 05/23/2019 Assayed at [facility] [address] [place] [address] [phone]   Final     ACE Angiotensin Conv Enz CSF 05/23/2019 1.5  0.0 - 2.5 U/L Final     Specimen Description 05/23/2019 Cerebrospinal fluid   Final     Culture Micro 05/23/2019    Preliminary                    Value:Culture received and in progress.  Positive AFB results are called as soon as detected.    Final report to follow in 7 to 8 weeks.       Culture Micro 05/23/2019 Assayed at [facility] [address] [place] [address] [phone]   Preliminary   Orders Only on 05/10/2019   Component Date Value Ref Range Status     Sodium 05/10/2019 138  133 - 144 mmol/L Final     Potassium 05/10/2019 3.5  3.4 - 5.3 mmol/L Final     Chloride 05/10/2019 102  94 - 109 mmol/L Final     Carbon Dioxide 05/10/2019 28  20 - 32 mmol/L Final     Anion Gap 05/10/2019 8  3 - 14 mmol/L Final     Glucose 05/10/2019 87  70 - 99 mg/dL Final     Urea Nitrogen 05/10/2019 18  7 - 30 mg/dL Final     Creatinine 05/10/2019 0.86  0.52 - 1.04 mg/dL Final     GFR Estimate 05/10/2019 71  >60 mL/min/[1.73_m2] Final     GFR Estimate If Black 05/10/2019 82  >60 mL/min/[1.73_m2] Final     Calcium 05/10/2019 10.6* 8.5 - 10.1 mg/dL Final   Office Visit on 05/10/2019   Component Date Value Ref Range Status     Hemoglobin A1C 05/10/2019 5.7* 0 - 5.6 % Final     Vitamin E 05/10/2019 12.3  5.5 - 18.0 mg/L Final     Vitamin E Gamma 05/10/2019 1.3  0.0 - 6.0 mg/L Final     Vitamin B12 05/10/2019 1,269* 193 - 986 pg/mL Final     Methylmalonic Acid 05/10/2019 0.16  0.00 - 0.40 umol/L Final     Copper 05/10/2019 143  80 - 155 ug/dL Final     KD interpretation 05/10/2019 Negative  NEG^Negative Final      SSA (Ro) (LESLYE) Antibody, IgG 05/10/2019 <0.2  0.0 - 0.9 AI Final     SSB (La) (LESLYE) Antibody, IgG 05/10/2019 <0.2  0.0 - 0.9 AI Final   ]      MRI brain:    MRI Dated 6/11/19:  Mild to moderate  T2 disease burden with  0 enhancion lesion(s).       ASSESSMENT/PLAN:  The patient is a 64-year-old woman with past medical history of newly diagnosed multiple sclerosis who is presenting today as a follow-up.  Given her history, her imaging, her CSF analysis, and her exam the diagnosis of sclerosis is now secured.  While it is difficult to say her diagnosis is relapsing remitting or secondary progressive, there is no activity on her current MRI.  Consequently, I would not favor starting a disease modifying therapy and her at this time.  I spent a prolonged period of time answering several questions with the patient.  I explained to her how multiple sclerosis causes disability.  I also explained how people progress with the disease.  I also explained the rate in which people develop walking problems over time.  I advised the patient on the uses of alpha lipoic acid and biotin.  I warned her of the risk and benefits of this.  I also reviewed several other potential therapies for her I also reviewed Mayzent.  I told the patient that probably recommend against this medication at this time.  I advised the patient that continuing to exercise is probably the best thing for her at this point.  Ice to the the heart healthy diet.  I will tentatively plan to follow the patient up in 4 months after the atriums conference.  I instructed her to call my office with any questions, concerns, issues or problems    Follow-up in 4 months      I spent 25 minutes in this visit, with >50% direct patient time spent counseling about prognosis, treatment options, and coordination of care.    Omar Francis MD [facility]  Staff Neurologist   06/11/19       (Chart documentation was completed in part with Dragon voice-recognition software. Even  though reviewed, some grammatical, spelling, and word errors may remain.)

## 2019-06-20 LAB
FUNGUS SPEC CULT: NORMAL
Lab: NORMAL
SPECIMEN SOURCE: NORMAL

## 2019-06-21 ENCOUNTER — THERAPY VISIT (OUTPATIENT)
Dept: PHYSICAL THERAPY | Facility: CLINIC | Age: 64
End: 2019-06-21
Payer: COMMERCIAL

## 2019-06-21 DIAGNOSIS — R26.9 GAIT DIFFICULTY: Primary | ICD-10-CM

## 2019-06-21 DIAGNOSIS — R29.898 WEAKNESS OF RIGHT LOWER EXTREMITY: ICD-10-CM

## 2019-06-21 NOTE — DISCHARGE INSTRUCTIONS
6/21/19   - Try rolling your foot out before and after your long walks to see if it helps with the left foot tingling   - Try using both of your walking poles when you go for your walks. This will help to conserve energy in your legs and increase your arm movement.

## 2019-07-01 ENCOUNTER — THERAPY VISIT (OUTPATIENT)
Dept: PHYSICAL THERAPY | Facility: CLINIC | Age: 64
End: 2019-07-01
Payer: COMMERCIAL

## 2019-07-01 DIAGNOSIS — R29.898 WEAKNESS OF RIGHT LOWER EXTREMITY: Primary | ICD-10-CM

## 2019-07-01 DIAGNOSIS — R26.9 GAIT DIFFICULTY: ICD-10-CM

## 2019-07-02 ENCOUNTER — MYC MEDICAL ADVICE (OUTPATIENT)
Dept: INTERNAL MEDICINE | Facility: CLINIC | Age: 64
End: 2019-07-02

## 2019-07-02 ENCOUNTER — TELEPHONE (OUTPATIENT)
Dept: NEUROLOGY | Facility: CLINIC | Age: 64
End: 2019-07-02

## 2019-07-02 NOTE — RESULT ENCOUNTER NOTE
Dear Poornima,   I called you today. I was reviewing your blood work results and all return normal, except hemoglobin A1C which was slightly elevated 5.7 (ranges 0-5.6).   I will CC Dr Marcum so he is aware of this results.   Please let me know if you have any questions.   Sincerely, Shira

## 2019-07-10 ENCOUNTER — ANCILLARY PROCEDURE (OUTPATIENT)
Dept: BONE DENSITY | Facility: CLINIC | Age: 64
End: 2019-07-10
Attending: INTERNAL MEDICINE
Payer: COMMERCIAL

## 2019-07-10 DIAGNOSIS — E28.39 ESTROGEN DEFICIENCY: ICD-10-CM

## 2019-07-15 ENCOUNTER — THERAPY VISIT (OUTPATIENT)
Dept: PHYSICAL THERAPY | Facility: CLINIC | Age: 64
End: 2019-07-15
Payer: COMMERCIAL

## 2019-07-15 DIAGNOSIS — R29.898 WEAKNESS OF RIGHT LOWER EXTREMITY: Primary | ICD-10-CM

## 2019-07-15 DIAGNOSIS — R29.898 WEAKNESS OF RIGHT LOWER EXTREMITY: ICD-10-CM

## 2019-07-15 DIAGNOSIS — R79.89 HIGH SERUM CALCIUM: ICD-10-CM

## 2019-07-15 DIAGNOSIS — R26.9 GAIT DIFFICULTY: ICD-10-CM

## 2019-07-15 DIAGNOSIS — N28.9 RENAL INSUFFICIENCY: ICD-10-CM

## 2019-07-15 LAB
ANION GAP SERPL CALCULATED.3IONS-SCNC: 4 MMOL/L (ref 3–14)
APTT PPP: 26 SEC (ref 22–37)
BUN SERPL-MCNC: 15 MG/DL (ref 7–30)
CALCIUM SERPL-MCNC: 9.1 MG/DL (ref 8.5–10.1)
CHLORIDE SERPL-SCNC: 105 MMOL/L (ref 94–109)
CO2 SERPL-SCNC: 29 MMOL/L (ref 20–32)
CREAT SERPL-MCNC: 1.04 MG/DL (ref 0.52–1.04)
GFR SERPL CREATININE-BSD FRML MDRD: 56 ML/MIN/{1.73_M2}
GLUCOSE SERPL-MCNC: 89 MG/DL (ref 70–99)
INR PPP: 0.99 (ref 0.86–1.14)
POTASSIUM SERPL-SCNC: 4.1 MMOL/L (ref 3.4–5.3)
SODIUM SERPL-SCNC: 138 MMOL/L (ref 133–144)

## 2019-07-15 NOTE — DISCHARGE INSTRUCTIONS
7/15/19   - Add yellow band to the clamshell exercise   - Change bridging exercise to one leg bridging   - You can make yourself a cooling neck band, also for your wrists or ankles to wear as your are walking outside on hot days.

## 2019-07-16 NOTE — RESULT ENCOUNTER NOTE
Tomás Noguera,   Your INR and PTT orders were done yesterday and normal. Let me know if you have any questions.   Take care, Shira

## 2019-07-23 LAB
MYCOBACTERIUM SPEC CULT: NORMAL
MYCOBACTERIUM SPEC CULT: NORMAL
SPECIMEN SOURCE: NORMAL

## 2019-07-30 ENCOUNTER — THERAPY VISIT (OUTPATIENT)
Dept: PHYSICAL THERAPY | Facility: CLINIC | Age: 64
End: 2019-07-30
Payer: COMMERCIAL

## 2019-07-30 DIAGNOSIS — R29.898 WEAKNESS OF RIGHT LOWER EXTREMITY: Primary | ICD-10-CM

## 2019-07-30 NOTE — DISCHARGE INSTRUCTIONS
7/30/19  - When doing single leg heel raise on the right, try to lift heel as high as you can.   - Try to aim for 4 days/week for strengthening exercises. You can plan for the week ahead and schedule your workout days to make it less stressful. You can include the shoulder strengthening exercises as able with your leg strengthening (add1-2 when able: 2 days/week).   - On days you do not do the strengthening exercises and/or walking/biking: you can perform the following exercises for cardio/balance/strengtheing: standing marches, standing butt kicks, and soccer kicks - 30 seconds each with 15-30sec rest break in between 2-3x.

## 2019-08-26 ENCOUNTER — THERAPY VISIT (OUTPATIENT)
Dept: PHYSICAL THERAPY | Facility: CLINIC | Age: 64
End: 2019-08-26
Payer: MEDICARE

## 2019-08-26 DIAGNOSIS — R29.898 WEAKNESS OF RIGHT LOWER EXTREMITY: Primary | ICD-10-CM

## 2019-08-26 DIAGNOSIS — R26.9 GAIT DIFFICULTY: ICD-10-CM

## 2019-08-26 NOTE — PROGRESS NOTES
Outpatient Physical Therapy Discharge Note     Patient: Poornima Hilton  : 1955    Beginning/End Dates of Reporting Period:  05/10/19 to 2019    Referring Provider: MAHOGANY Garcia CNP    Therapy Diagnosis: gait difficulty, weakness of RLE      Client Self Report: Last week I started alternating biking/walking and strengthening exercises every other day. The ankle exercise really is making a difference. When at a red light my ankle doesnt get tired anymore when driving. Tried the standing exercises (butt kicks, marches, soccer kicks).  Objective Measurements:        Objective Measure: MMT   Details: LE: 5/, RLE: 4/5 hips, 4+/5 knees, 4+/5 df. gluts: 5/5, hip abd: 4+/5         FGA:  - Significant improvement from initial evaluation.   On 5/10/19 was 24     Goals:  Goal Identifier LE strength   Goal Description Pt will score in normal range for all R LE strength MMT (4 or greater) in order to demonsrate improved strength for return to prior level of function    Target Date 19   Date Met  19   Progress: GOAL MET      Goal Identifier Gait    Goal Description Pt will report being able to walk up to 4 miles w/o R ankle instability or RLE fatigue in order to improve community mobility    Target Date 19   Date Met  ([place] - has not extended past this. )   Progress: Patient has progressed nicely in her aerobic capacity, jogging more and actively walking. Has not had the opportunity to go hiking yet. Anticipate her aerobic capacity will continue to progress with continued walking/running/strengthening HEP      Goal Identifier HEP    Goal Description Pt will be independent in HEP in order to improve LE strength, activity tolerance and balance    Target Date 19   Date Met  19   Progress:         Progress Toward Goals:   Progress this reporting period: Pt has met goals.     Plan:  Discharge from therapy.    Discharge:    Reason for Discharge: Patient  has met all goals.    Equipment Issued: N/A    Discharge Plan: Patient to continue home program.

## 2019-08-27 ENCOUNTER — OFFICE VISIT (OUTPATIENT)
Dept: OPHTHALMOLOGY | Facility: CLINIC | Age: 64
End: 2019-08-27
Attending: OPHTHALMOLOGY
Payer: MEDICARE

## 2019-08-27 DIAGNOSIS — H40.1131 PRIMARY OPEN ANGLE GLAUCOMA OF BOTH EYES, MILD STAGE: Primary | ICD-10-CM

## 2019-08-27 DIAGNOSIS — H25.13 NUCLEAR SENILE CATARACT OF BOTH EYES: ICD-10-CM

## 2019-08-27 PROCEDURE — G0463 HOSPITAL OUTPT CLINIC VISIT: HCPCS | Mod: ZF

## 2019-08-27 PROCEDURE — 92083 EXTENDED VISUAL FIELD XM: CPT | Mod: ZF | Performed by: OPHTHALMOLOGY

## 2019-08-27 RX ORDER — AMOXICILLIN 500 MG
CAPSULE ORAL
COMMUNITY
Start: 2019-06-05

## 2019-08-27 ASSESSMENT — SLIT LAMP EXAM - LIDS
COMMENTS: NORMAL
COMMENTS: NORMAL

## 2019-08-27 ASSESSMENT — VISUAL ACUITY
OD_CC: 20/25
OD_CC+: -1
CORRECTION_TYPE: GLASSES
METHOD: SNELLEN - LINEAR
OS_CC+: -2
OS_CC: 20/25 (SLOW)

## 2019-08-27 ASSESSMENT — TONOMETRY
OD_IOP_MMHG: 11
OS_IOP_MMHG: 10
OD_IOP_MMHG: 13
IOP_METHOD: APPLANATION
IOP_METHOD: APPLANATION
OS_IOP_MMHG: 12

## 2019-08-27 ASSESSMENT — EXTERNAL EXAM - RIGHT EYE: OD_EXAM: NORMAL

## 2019-08-27 ASSESSMENT — CONF VISUAL FIELD
OD_NORMAL: 1
METHOD: COUNTING FINGERS
OS_NORMAL: 1

## 2019-08-27 ASSESSMENT — CUP TO DISC RATIO
OD_RATIO: 0.7
OS_RATIO: 0.8

## 2019-08-27 ASSESSMENT — REFRACTION_WEARINGRX
OS_SPHERE: -0.75
OS_ADD: +2.50
OD_SPHERE: -1.25
OD_CYLINDER: +1.25
OS_CYLINDER: +1.75
OD_AXIS: 048
OS_AXIS: 150
OD_ADD: +2.50

## 2019-08-27 ASSESSMENT — EXTERNAL EXAM - LEFT EYE: OS_EXAM: NORMAL

## 2019-08-27 NOTE — NURSING NOTE
Chief Complaints and History of Present Illnesses   Patient presents with     Follow Up     Primary open angle glaucoma of both eyes, moderate stage     Chief Complaint(s) and History of Present Illness(es)     Follow Up     Laterality: both eyes    Associated symptoms: Negative for dryness, eye pain, tearing and redness    Pain scale: 0/10    Comments: Primary open angle glaucoma of both eyes, moderate stage              Comments     She states that her vision has seemed stable in both eyes since her last eye exam. Patient denies having any eye discomfort.  She is very compliant in using her eye drops as directed.    Jenifer Morgan, COT 8:21 AM  August 27, 2019

## 2019-08-27 NOTE — PATIENT INSTRUCTIONS
Patient will continue on Latanoprost which is a teal top drop at bedtime in both eyes and Simbrinza (Brinzolamide/Brimonidine) which is a white top drop 2x/day (12 hours apart) in the LEFT EYE ONLY.    Patient will return to clinic in 4-6 months with repeat IOP check and visual field test (LEFT EYE FIRST), dilated eye exam, and OCT with RNFL analysis.

## 2019-08-27 NOTE — PROGRESS NOTES
1)POAG/LTG vs Glc Suspect -- H/O DH OS, ??H/O Raynaud's per old notes, H/O low BP, anemia in past -- K pachy: 583/589   Tmax:20/19     HVF: OD:Full c fluct and OS:?early sup arcuate      CDR: 0.7/0.8    HRT/OCT:OD:tr RNFL thinning (fairly stable from 7605-8496) and OS:Mild RNFL thinning (prog from 7813-4930)      FHX of Glc: Mother -- had surgery, lost vision     Gonio: open      Intolerant to:      Asthma/COPD: No  Steroid Use:No     Kidney Stones:  No   Sulfa Allergy: No     IOP targets:  2)NS OU  3)H/O MS -- following with Neurology     MD:Started Simbrinza in 11/2017    Patient will continue on Latanoprost which is a teal top drop at bedtime in both eyes and Simbrinza (Brinzolamide/Brimonidine) which is a white top drop 2x/day (12 hours apart) in the LEFT EYE ONLY.    Patient will return to clinic in 4-6 months with repeat IOP check and visual field test (LEFT EYE FIRST), dilated eye exam, and OCT with RNFL analysis.      Resident Note:  POAG/LTG vs Glc Suspect   Gtts: latanoprost at bedtime each eye; Simbrinza BID left eye;  Here for repeat 24-2 left eye given concern for progression 4/2019  24-2 left eye: reliable, ?small inf step, but no sign of previous sup arcuate or step; improved MD   IOP good, good gtt adherence, VF stable  RTC 6-8 months for RNFL and 24-2 each eye, dilated exam    Samuel Benavides MD  Ophthalmology Resident  PGY-3     Attending Physician Attestation:  Complete documentation of historical and exam elements from today's encounter can be found in the full encounter summary report (not reduplicated in this progress note). I personally obtained the chief complaint(s) and history of present illness.  I confirmed and edited as necessary the review of systems, past medical/surgical history, family history, social history, and examination findings as documented by others; and I examined the patient myself. I personally reviewed the relevant tests, images, and reports as documented above. I  formulated and edited as necessary the assessment and plan and discussed the findings and management plan with the patient and family.  - Felicia Abreu MD

## 2019-09-11 ENCOUNTER — DOCUMENTATION ONLY (OUTPATIENT)
Dept: NEUROLOGY | Facility: CLINIC | Age: 64
End: 2019-09-11

## 2019-09-11 NOTE — PROGRESS NOTES
Received imaging disk with reports- sending to film room to be uploaded into PACS - will return to patient once received back from film room  Sophia Wu MA

## 2019-10-15 ENCOUNTER — OFFICE VISIT (OUTPATIENT)
Dept: NEUROLOGY | Facility: CLINIC | Age: 64
End: 2019-10-15
Attending: PSYCHIATRY & NEUROLOGY
Payer: COMMERCIAL

## 2019-10-15 ENCOUNTER — OFFICE VISIT (OUTPATIENT)
Dept: INTERNAL MEDICINE | Facility: CLINIC | Age: 64
End: 2019-10-15
Payer: MEDICARE

## 2019-10-15 ENCOUNTER — ANCILLARY PROCEDURE (OUTPATIENT)
Dept: MAMMOGRAPHY | Facility: CLINIC | Age: 64
End: 2019-10-15
Attending: INTERNAL MEDICINE
Payer: MEDICARE

## 2019-10-15 VITALS
OXYGEN SATURATION: 99 % | HEIGHT: 65 IN | DIASTOLIC BLOOD PRESSURE: 77 MMHG | WEIGHT: 158 LBS | BODY MASS INDEX: 26.33 KG/M2 | HEART RATE: 75 BPM | SYSTOLIC BLOOD PRESSURE: 122 MMHG

## 2019-10-15 VITALS
HEART RATE: 69 BPM | HEIGHT: 65 IN | DIASTOLIC BLOOD PRESSURE: 76 MMHG | SYSTOLIC BLOOD PRESSURE: 115 MMHG | BODY MASS INDEX: 26.31 KG/M2 | WEIGHT: 157.9 LBS

## 2019-10-15 DIAGNOSIS — Z12.31 VISIT FOR SCREENING MAMMOGRAM: ICD-10-CM

## 2019-10-15 DIAGNOSIS — E78.5 HYPERLIPIDEMIA, UNSPECIFIED HYPERLIPIDEMIA TYPE: Primary | ICD-10-CM

## 2019-10-15 DIAGNOSIS — G35 MS (MULTIPLE SCLEROSIS) (H): Primary | ICD-10-CM

## 2019-10-15 DIAGNOSIS — F32.A DEPRESSION, UNSPECIFIED DEPRESSION TYPE: ICD-10-CM

## 2019-10-15 RX ORDER — BUPROPION HYDROCHLORIDE 100 MG/1
100 TABLET, EXTENDED RELEASE ORAL 2 TIMES DAILY
Qty: 200 TABLET | Refills: 3 | Status: SHIPPED | OUTPATIENT
Start: 2019-10-15 | End: 2020-10-22

## 2019-10-15 ASSESSMENT — MIFFLIN-ST. JEOR
SCORE: 1262.56
SCORE: 1262.11

## 2019-10-15 ASSESSMENT — PAIN SCALES - GENERAL
PAINLEVEL: NO PAIN (0)
PAINLEVEL: NO PAIN (0)

## 2019-10-15 NOTE — LETTER
10/15/2019       RE: Poornima Hilton  [address] [place] [address]     Dear Colleague,    Thank you for referring your patient, Poornima Hilton, to the [facility] MULTIPLE SCLEROSIS at [facility]. Please see a copy of my visit note below.    MULTIPLE SCLEROSIS CLINIC AT THE [facility]  FOLLOWUP/ESTABLISHED PATIENT VISIT      PRINCIPAL NEUROLOGIC DIAGNOSIS: Multiple Sclerosis   Date of Onset: 2000   Date of Diagnosis: NA   Initial Clinical Course: Relapsing Remitting   Current Clinical Course: Relapsing Remitting   Past Disease Modifying Therapy(ies): NA   Current Disease Modifying Therapy(ies):NA   Most Recent MRI of the Brain: 6/11/19   Most Recent MRI of the Cervical Cord 5/7/19   Most Recent MRI of the Thoracic Cord: 5/13/19   Most Recent Lumbar Puncture: 5/22/19 (RBC 1, WBC 1, Glucose 58, Protein 58, 8 OCB)      CHIEF COMPLAINT: Follow up off DMT      INTERVAL HISTORY:    Overall the patient reports that she is doing well. She has no new symptoms and feels that several of her symptoms have improved secondary to her exercise program.    Issues with current MS therapy: Not on DMT    REVIEW OF SYSTEMS:    Mood: unchanged  Spasticity:unchanged  Bladder: unchanged  Bowel: unchanged  Pain related to today's visit:reviewed on nursing intake documentation  Fatigue: unchanged  Sleep: unchanged  Memory/Concentration: unchanged      Otherwise 10 point ROS was neg other than the symptoms noted above.    PAST HISTORY was reviewed and updated:      MEDICATIONS and ALLERGIES were reviewed and updated.    SOCIAL HISTORY was reviewed and updated:      EXAM:    PHYSICAL EXAMINATION:   VITAL SIGNS:  B/P: 115/76, T: Data Unavailable, P: 69, R: Data Unavailable    GENERAL: The patient is a well-nourished  who presents to the evaluation alone.  NEUROLOGIC:   MENTAL STATUS: Alert,awake and  oriented times four.   CRANIAL NERVES:  Visual fields are full to confrontation. The pupils  are  round and react to light and there is no Tano Ta pupil.  Extraocular movements are  intact with no  internuclear ophthalmoplegia. No nystagmus. Facial strength and sensation are  normal. Hearing is  normal. Palate elevation and tongue protrusion are  normal.   POWER:     Motor    Upper      Right Left   Shoulder Abduction 5 5   Elbow Flexion 5 5   Elbow Extension 5 5   Wrist Extension 5 5   Digit Extension 5 5   Digit Flexion 5 5   APB 5 5   Tone 0 0   Lower       Right Left   Hip Flexion 5 5   Knee Extension 5 5   Knee Flexion 5 5   Foot Dorsiflexion 5 5   Foot Plantar Flexion 5 5   EH 5 5   Toe Flexion 5 5   Tone 0 0           Grade Description   0 No increase in muscle tone   1 Slight increase in muscle tone, manifested by a catch and release or by minimal resistance at the end of the range of motion when the affected part(s) is moved in flexion or extension   1+ Slight increase in muscle tone, manifested by a catch, followed by minimal resistance throughout the remainder (less than half) of the ROM   2 More marked increase in muscle tone through most of the ROM, but affected part(s) easily moved   3 Considerable increase in muscle tone, passive movement difficult   4 Affected part(s) rigid in flexion or extension           SENSORY:     Light touch:  Intact in all extremities        MOTOR/CEREBELLAR:    Right Left   RRM 0 Normal 0 Normal   HECTOR 0 Normal 0 Normal   FTN 0 Normal 0 Normal   RRM 0 Normal 0 Normal   HKS 0 Normal 0 Normal           GAIT: Gait is   narrow-based and steady and the patient is  able to walk on heels, toes and in tandem without difficulty.    Romberg: Stable with eye(s) closed    RESULTS:  Monitoring labs:    Orders Only on 07/15/2019   Component Date Value Ref Range Status     INR 07/15/2019 0.99  0.86 - 1.14 Final     PTT 07/15/2019 26  22 - 37 sec Final     Sodium 07/15/2019 138  133 - 144 mmol/L Final     Potassium 07/15/2019 4.1  3.4 - 5.3 mmol/L Final     Chloride 07/15/2019  105  94 - 109 mmol/L Final     Carbon Dioxide 07/15/2019 29  20 - 32 mmol/L Final     Anion Gap 07/15/2019 4  3 - 14 mmol/L Final     Glucose 07/15/2019 89  70 - 99 mg/dL Final     Urea Nitrogen 07/15/2019 15  7 - 30 mg/dL Final     Creatinine 07/15/2019 1.04  0.52 - 1.04 mg/dL Final     GFR Estimate 07/15/2019 56* >60 mL/min/[1.73_m2] Final     GFR Estimate If Black 07/15/2019 65  >60 mL/min/[1.73_m2] Final     Calcium 07/15/2019 9.1  8.5 - 10.1 mg/dL Final   Hospital Outpatient Visit on 05/23/2019   Component Date Value Ref Range Status     Immunoglobulin G 05/23/2019 Canceled, Test credited   Final     Immunoglobulin G CSF 05/23/2019 Canceled, Test credited   Final     Immunoglobulin G CSF Index 05/23/2019 Canceled, Test credited   Final     Albumin by Nephelometry 05/23/2019 Canceled, Test credited   Final     Immunoglobulin G Albumin CSF 05/23/2019 Canceled, Test credited   Final     Immunoglobulin G Albumin Index 05/23/2019 Canceled, Test credited   Final     CSF Synthesis Rate 05/23/2019 Canceled, Test credited   Final     IgG Albumin Ration 05/23/2019 Canceled, Test credited   Final     VDRL CSF 05/23/2019 Non Reactive  Non Reactive Final     Glucose CSF 05/23/2019 58  40 - 70 mg/dL Final     Copath Report 05/23/2019    Final                    Value:Patient Name: JULIUS CASTELLANOS  MR#: [id number]  Specimen #: [id number]  Collected: 5/23/2019 11:15  Received: 5/23/2019 15:05  Reported: 5/24/2019 10:51  Ordering Phy(s): YANA OTERO    For improved result formatting, select 'View Enhanced Report Format' under   Linked Documents section.  _________________________________________    SPECIMEN(S):  Cerebrospinal fluid    INTERPRETATION:  Cerebrospinal fluid;       Rare to absent B cells       See comment    COMMENT:  There is no immunophenotypic evidence of B-cell non-Hodgkin lymphoma.   Neoplastic cells, including large cells,  may not survive specimen processing. The total number of cells is low    limiting the number of antibodies that  can be analyzed and limiting the interpretation. Only B-cell antigens were   evaluated. This sample may not be  representative. Final interpretation requires correlation with morphologic   and clinical features.    RESULTS:  Percentages reported below are based on the total number of CD45                           positive   viable leukocytes.  B cells are rare to absent    Resident/Fellow Review by:  Dr. Noel Mata    A resident/fellow in an ACGME accredited training program was involved in   the selection of testing, review of  flow scattergrams, and/or interpretation of this case. I, as the senior   physician, attest that I: (i)  confirmed appropriate testing, (ii) examined the relevant flow   scattergrams for the specimen(s); and (ii)  rendered or confirmed the interpretation(s).    ANTIBODIES:  Nine-color analyses are performed for the following markers: CD5, CD10,   CD19, CD20, CD34, CD38, CD45,and kappa  and lambda immunoglobulin light chains. Cells are gated to isolate   populations (CD45 versus side scatter and  forward scatter versus side scatter), to exclude debris (forward scatter   versus side scatter) and to exclude  cell doublets (forward scatter height versus forward scatter width and   side scatter height versus side scatter  width). Forward scatter varies with cell size. S                          lewis scatter varies with   the amount of cytoplasmic granules.  Intensity for CD45 usually increases as hematolymphoid cells mature.    CLINICAL HISTORY:  64 year old female with abnormal cervical MRI hyperintense lesion and leg   weakness.    I have personally reviewed all specimens and/or slides, including the   listed special stains, and used them  with my medical judgment to determine the final diagnosis.    Electronically signed out by:    Elva Griffin M.D., Physicians    This test was developed and its performance characteristics determined by    [facility]. It has not been cleared or   approved by the US Food and Drug  Administration.  FDA does not require this test to go through premarket   FDA review. This test is used for  clinical purposes and should not be regarded as investigational or for   research.  This laboratory is certified  under the Clinical Laboratory Improvement Amendments (CLIA) as qualified   t                          o perform high complexity clinical  laboratory testing.    CPT Codes:  A: 14049-NO, 34444-08-MSUG56(7), 04167-IFML7-31, 68883-21-EKHH73    TESTING LAB LOCATION:  [address] [place] [id number] [phone]    COLLECTION SITE:  Client:  [facility]  Location:  SHXRAY (S)       Lymes IgG CSF Immunoblot 05/23/2019 Negative  Negative Final     Lymes IgM CSF Immunoblot 05/23/2019 Negative  Negative Final     Protein Total CSF 05/23/2019 26  15 - 60 mg/dL Final     Immunoglobulin Serum IgG 05/23/2019 830  768 - 1,632 mg/dL Final     Oligoclonal IgG CSF 05/23/2019 2.2  0.0 - 6.0 mg/dL Final     Albumin by Nephelometry 05/23/2019 4,080  3,500 - 5,200 mg/dL Final     Oligoclonal Albumin CSF 05/23/2019 9  0 - 35 mg/dL Final     Oligoclonal Albumin Index 05/23/2019 2.2  0.0 - 9.0 ratio Final     Oligoclonal IgG Index 05/23/2019 1.20* 0.28 - 0.66 ratio Final     CSF IgG/Albumin Ratio 05/23/2019 0.24  0.09 - 0.25 ratio Final     Oligoclonal Banding 05/23/2019 Positive* Negative Final     CSF Oligoclonal Bands Number 05/23/2019 8* 0 - 1 Bands Final     IgG Synthesis Rate 05/23/2019 3.4  <=8.0 mg/d Final     Oligoclonal Interpretation 05/23/2019 SEE NOTE   Final     Specimen Description 05/23/2019 Cerebrospinal fluid   Final     Special Requests 05/23/2019 TUBE 3   Final     Culture Micro 05/23/2019 Culture negative after 4 weeks   Final     Specimen Description 05/23/2019  Cerebrospinal fluid   Final     Special Requests 05/23/2019 TUBE 3   Final     Gram Stain 05/23/2019 No organisms seen   Final     Gram Stain 05/23/2019    Final                    Value:Rare  WBC'S seen       Gram Stain 05/23/2019 No PMNs seen   Final     Gram Stain 05/23/2019    Final                    Value:Gram stain review consistent with reported results.  Gram stain slide reviewed at the [facility]       Gram Stain 05/23/2019    Final                    Value:Quantification of host cells and microbiological organisms was done on a cytocentrifuged   preparation.       Specimen Description 05/23/2019 Cerebrospinal fluid   Final     Special Requests 05/23/2019 TUBE 3   Final     Culture Micro 05/23/2019 No growth   Final     Copath Report 05/23/2019    Final                    Value:Patient Name: JULIUS CASTELLANOS  MR#: [id number]  Specimen #: [id number]  Collected: 5/23/2019  Received: 5/24/2019  Reported: 5/24/2019 12:02  Ordering Phy(s): YANA OTERO    For improved result formatting, select 'View Enhanced Report Format' under   Linked Documents section.    SPECIMEN/STAIN PROCESS:  Cerebrospinal Fluid       Pap-Cyto x 1, Steel's stain-cyto x 1    ----------------------------------------------------------------  CYTOLOGIC INTERPRETATION:     Cerebrospinal Fluid:   Negative for malignancy  Specimen Adequacy: Satisfactory for evaluation.    I have personally reviewed all specimens and/or slides, including the   listed special stains, and used them  with my medical judgement to determine or confirm the final diagnosis.    Electronically signed out by:    Abhinav Chow M.D.    CLINICAL HISTORY:  Hyperintense lesion and leg weakness.    ,    GROSS:  Cerebrospinal Fluid:  Received 0.8 ml of colorless, clear fluid, processed   as 1 Pap stained cytospin                           and 1  Steel stained cytospin.    MICROSCOPIC:  A microscopic examination is  performed.    CPT Codes:  A: 02693-UTY, 29564-[id number]    COLLECTION SITE:  Client:  [facility]  Location:  XRAY (S)    The technical component of this testing was completed at the [facility], with the professional component performed   at the [facility]  [facility]atory, [address] [place] [phone] ([phone])         WBC CSF 05/23/2019 1  0 - 5 /uL Final     RBC CSF 05/23/2019 1  0 - 2 /uL Final     Tube Number 05/23/2019 4  # Final     Color CSF 05/23/2019 Colorless  CLRL^Colorless Final     Appearance CSF 05/23/2019 Clear  CLER^Clear Final     Lyme CSF [place] 05/23/2019 0.01  <=0.99 GIANNI Final     Specimen Description 05/23/2019 Cerebrospinal fluid   Final     AFB Stain 05/23/2019 Negative for acid fast bacteria   Final     AFB Stain 05/23/2019 Assayed at [facility] [employer] [address] [place] [address] [phone]   Final     ACE Angiotensin Conv Enz CSF 05/23/2019 1.5  0.0 - 2.5 U/L Final     Specimen Description 05/23/2019 Cerebrospinal fluid   Final     Culture Micro 05/23/2019 Culture negative for acid fast bacilli   Final     Culture Micro 05/23/2019 Assayed at [facility] [place] [address] [phone]   Final   Orders Only on 05/10/2019   Component Date Value Ref Range Status     Sodium 05/10/2019 138  133 - 144 mmol/L Final     Potassium 05/10/2019 3.5  3.4 - 5.3 mmol/L Final     Chloride 05/10/2019 102  94 - 109 mmol/L Final     Carbon Dioxide 05/10/2019 28  20 - 32 mmol/L Final     Anion Gap 05/10/2019 8  3 - 14 mmol/L Final     Glucose 05/10/2019 87  70 - 99 mg/dL Final     Urea Nitrogen 05/10/2019 18  7 - 30 mg/dL Final     Creatinine 05/10/2019 0.86  0.52 - 1.04 mg/dL Final     GFR Estimate 05/10/2019 71  >60 mL/min/[1.73_m2] Final     GFR Estimate If Black 05/10/2019 82  >60 mL/min/[1.73_m2] Final     Calcium 05/10/2019 10.6* 8.5 - 10.1 mg/dL Final   Office  Visit on 05/10/2019   Component Date Value Ref Range Status     Hemoglobin A1C 05/10/2019 5.7* 0 - 5.6 % Final     Vitamin E 05/10/2019 12.3  5.5 - 18.0 mg/L Final     Vitamin E Gamma 05/10/2019 1.3  0.0 - 6.0 mg/L Final     Vitamin B12 05/10/2019 1,269* 193 - 986 pg/mL Final     Methylmalonic Acid 05/10/2019 0.16  0.00 - 0.40 umol/L Final     Copper 05/10/2019 143  80 - 155 ug/dL Final     KD interpretation 05/10/2019 Negative  NEG^Negative Final     SSA (Ro) (LESLYE) Antibody, IgG 05/10/2019 <0.2  0.0 - 0.9 AI Final     SSB (La) (LESLYE) Antibody, IgG 05/10/2019 <0.2  0.0 - 0.9 AI Final   ]      MRI brain:    no new MRI to review    ASSESSMENT/PLAN:  The patient is a 64 year old woman with a past medical history of relapsing remitting/secondary progressive multiple sclerosis who is presenting today as a follow-up.  Overall, the patient is largely clinically improved since her last visit.  I think the bulk of this clinical improvement is her dedication to an exercise program.  I spent time encouraging her to continue with this exercise program and I explained the benefits of exercise and people with multiple sclerosis.  I also discussed several potential future therapies that look more people at the progressive stage of multiple sclerosis.  If the patient does well, I will have her follow-up in 6 months.  I instructed the patient to call my office with any questions, concerns, issues or problems    Follow up in 6 months    I spent 25 minutes in this visit, with >50% direct patient time spent counseling about prognosis, treatment options, and coordination of care.    Omar Francis MD, MD A [facility]  Staff Neurologist   10/15/19     (Chart documentation was completed in part with Dragon voice-recognition software. Even though reviewed, some grammatical, spelling, and word errors may remain.)

## 2019-10-15 NOTE — PATIENT INSTRUCTIONS
[facility] Medication Refill Request Information:  * Please contact your pharmacy regarding ANY request for medication refills.  ** [facility] Prescription Fax = [phone]  * Please allow 3 business days for routine medication refills.  * Please allow 5 business days for controlled substance medication refills.     [facility] Test Result notification information:  *You will be notified with in 7-10 days of your appointment day regarding the results of your test.  If you are on MyChart you will be notified as soon as the provider has reviewed the results and signed off on them.    [facility]: [phone]

## 2019-10-15 NOTE — NURSING NOTE
Chief Complaint   Patient presents with     Physical     pt here fro physical       Leonel Loja CMA, EMT at 2:01 PM on 10/15/2019.

## 2019-10-15 NOTE — PATIENT INSTRUCTIONS
Will have you follow up in 6 month    You saw a neurology provider today at the [facility].  You may have also met with the MS RN Care Coordinator.  In order to get a message to your MS Center provider, you should contact [phone] option 3 for the triage nurse line.    You should contact us via this protocol if you have any of the following symptoms:    New or worsening neurologic symptoms that persist for 24-48 hours, such as:  o New onset of pain or marked worsening of pain  o Difficulty with speech, swallowing, or breathing  o New onset of vertigo or dizziness  o Change in bowel or bladder function (incontinence, difficulty urinating)    Increasing difficulty in self care    Marked changes in vision (double vision, blurred vision, graying of vision)    Change in mobility    Change in cognitive function    Falling    Worsening numbness, tingling or pain with a change in function    Worsening fatigue lasting more than 2 weeks  If you had labs completed today, we will contact you with the results.  If you are active in [employer], they will be released to you there.  Otherwise, your results will be provided to you via mail or telephone call.  Some results take up to 2 weeks for completion.  If you haven t heard anything about your lab results within 2 weeks, you can call or send a [employer] message to obtain your results.  If you have an MRI scheduled in the week or two prior to your next appointment, we will go over the results at your scheduled follow up appointment.  If you are not scheduled to see your MS Center provider within about 2 weeks after your MRI, please call or send a [employer] message to obtain your results if you haven t heard anything within 2 weeks.  Please be aware that it takes at least 5 business days after routine MRIs for your results to be reviewed by both the radiologist and your doctor.  MRIs completed at facilities outside of the [facility] take  about 2 weeks in order for the MRI disc to be mailed to our clinic and uploaded into your medical record.    Prescription refills should be faxed to us by your pharmacy.  Our fax number for prescription refills is [phone].  Please do your best to come to your appointments, and to arrive 15 minutes early to allow time for checking in.  [facility] reserves the right to terminate care of established patients if a patient misses three or more appointments in a clinic without providing notification within a 12-month period.    Developing Your Care Team  Individuals living with chronic illnesses like MS may be unaware that they are at risk for the same range of medical problems as everyone else.  This is why you must establish a relationship with other health care providers in addition to your Multiple Sclerosis doctor.  It can be difficult at times to figure out whether a health concern is related to your MS, or whether it is related to something else, such as hormonal changes, pseduoexacerbations, changes in your core body temperature, flu-like reactions to interferons, exercise, or infections.  Urinary tract infections (UTIs) are common culprits that can cause fatigue, weakness, or other  MS attack -like symptoms without classic symptoms of a UTI.  For this reason, if you call or come in to discuss symptoms, you may be asked to get in touch with your primary provider or another specialist, so that you receive the comprehensive care you need.  What is Multiple Sclerosis (MS)?  MS is a disease in which the nerve tissues in the brain and/or spinal cord are attacked by immune cells in the body.  These immune cells are present in everyone, and their normal role is to fight off infections.  In people with MS, these cells change the way they function and cross into the nervous system.  Once there, they cause inflammation that damages the myelin (or the protective coating of a nerve cell, much like  the plastic covering on an electrical cord) and parts of the nerve cell itself.  So far, a clear cause for this immune system dysfunction has not been found.  MS often starts out as the  relapsing-remitting  form.  This means there are episodes when you have symptoms, and other times when you recover to normal or near-normal.  Over time, if the damage to the nervous system continues, the disease can cause additional disability, such as difficulty walking.  If the relapses and nerve damage can be prevented with available medications, many patients with MS can go many years between relapses and have relatively little disability.  Remember: MS is a condition that changes and must be evaluated on an ongoing basis!  What is a Relapse? (Also called flare-ups, attacks, or exacerbations)  Relapses are due to the occurrence of inflammation in some part of the brain and/or spinal cord.  A relapse is new or recurrent symptoms which persist for at least 24 hours and sometimes worsen over 48 hours.  New symptoms need to be  by at least 1 month in order to be considered separate relapses. Most of the time, symptoms reach their maximal intensity within 2 weeks and then begin to slowly resolve.  At times, your symptoms may not recover fully for up to 6 months, depending on the severity of the episode.  The frequency of relapses is generally higher early in the disease, but can vary greatly among individuals with MS.  Improvement of symptoms for an individual is unpredictable with each relapse.    It is important to remember that an increase in symptoms and changes in function may not necessarily be a relapse.  There are other factors that contribute to such changes, such as hot weather, increased body temperature, infection/illness, stress and sleep deprivation.  The worsening of symptoms may feel like a relapse when in reality it is not.  These episodes are referred to as pseudorelapses.  Once the underlying cause is  addressed, symptoms usually fade away and you feel better.  If you experience a worsening of symptoms that lasts more than 48 hours and does not improve with cooling down, decreasing stress, or treatment of an infection, please call us and we can help to better determine whether you are having a pseudorelapse versus a relapse.

## 2019-10-15 NOTE — PROGRESS NOTES
MULTIPLE SCLEROSIS CLINIC AT THE [facility]  FOLLOWUP/ESTABLISHED PATIENT VISIT      PRINCIPAL NEUROLOGIC DIAGNOSIS: Multiple Sclerosis   Date of Onset: 2000   Date of Diagnosis: NA   Initial Clinical Course: Relapsing Remitting   Current Clinical Course: Relapsing Remitting   Past Disease Modifying Therapy(ies): NA   Current Disease Modifying Therapy(ies):NA   Most Recent MRI of the Brain: 6/11/19   Most Recent MRI of the Cervical Cord 5/7/19   Most Recent MRI of the Thoracic Cord: 5/13/19   Most Recent Lumbar Puncture: 5/22/19 (RBC 1, WBC 1, Glucose 58, Protein 58, 8 OCB)      CHIEF COMPLAINT: Follow up off DMT      INTERVAL HISTORY:    Overall the patient reports that she is doing well. She has no new symptoms and feels that several of her symptoms have improved secondary to her exercise program.    Issues with current MS therapy: Not on DMT    REVIEW OF SYSTEMS:    Mood: unchanged  Spasticity:unchanged  Bladder: unchanged  Bowel: unchanged  Pain related to today's visit:reviewed on nursing intake documentation  Fatigue: unchanged  Sleep: unchanged  Memory/Concentration: unchanged      Otherwise 10 point ROS was neg other than the symptoms noted above.    PAST HISTORY was reviewed and updated:      MEDICATIONS and ALLERGIES were reviewed and updated.    SOCIAL HISTORY was reviewed and updated:      EXAM:    PHYSICAL EXAMINATION:   VITAL SIGNS:  B/P: 115/76, T: Data Unavailable, P: 69, R: Data Unavailable    GENERAL: The patient is a well-nourished  who presents to the evaluation alone.  NEUROLOGIC:   MENTAL STATUS: Alert,awake and  oriented times four.   CRANIAL NERVES:  Visual fields are full to confrontation. The pupils are  round and react to light and there is no Tano Ta pupil.  Extraocular movements are  intact with no  internuclear ophthalmoplegia. No nystagmus. Facial strength and sensation are  normal. Hearing is  normal. Palate elevation and tongue protrusion are  normal.   POWER:      Motor    Upper      Right Left   Shoulder Abduction 5 5   Elbow Flexion 5 5   Elbow Extension 5 5   Wrist Extension 5 5   Digit Extension 5 5   Digit Flexion 5 5   APB 5 5   Tone 0 0   Lower       Right Left   Hip Flexion 5 5   Knee Extension 5 5   Knee Flexion 5 5   Foot Dorsiflexion 5 5   Foot Plantar Flexion 5 5   EH 5 5   Toe Flexion 5 5   Tone 0 0           Grade Description   0 No increase in muscle tone   1 Slight increase in muscle tone, manifested by a catch and release or by minimal resistance at the end of the range of motion when the affected part(s) is moved in flexion or extension   1+ Slight increase in muscle tone, manifested by a catch, followed by minimal resistance throughout the remainder (less than half) of the ROM   2 More marked increase in muscle tone through most of the ROM, but affected part(s) easily moved   3 Considerable increase in muscle tone, passive movement difficult   4 Affected part(s) rigid in flexion or extension           SENSORY:     Light touch:  Intact in all extremities        MOTOR/CEREBELLAR:    Right Left   RRM 0 Normal 0 Normal   HECTOR 0 Normal 0 Normal   FTN 0 Normal 0 Normal   RRM 0 Normal 0 Normal   HKS 0 Normal 0 Normal           GAIT: Gait is   narrow-based and steady and the patient is  able to walk on heels, toes and in tandem without difficulty.    Romberg: Stable with eye(s) closed    RESULTS:  Monitoring labs:    Orders Only on 07/15/2019   Component Date Value Ref Range Status     INR 07/15/2019 0.99  0.86 - 1.14 Final     PTT 07/15/2019 26  22 - 37 sec Final     Sodium 07/15/2019 138  133 - 144 mmol/L Final     Potassium 07/15/2019 4.1  3.4 - 5.3 mmol/L Final     Chloride 07/15/2019 105  94 - 109 mmol/L Final     Carbon Dioxide 07/15/2019 29  20 - 32 mmol/L Final     Anion Gap 07/15/2019 4  3 - 14 mmol/L Final     Glucose 07/15/2019 89  70 - 99 mg/dL Final     Urea Nitrogen 07/15/2019 15  7 - 30 mg/dL Final     Creatinine 07/15/2019 1.04  0.52 - 1.04 mg/dL  Final     GFR Estimate 07/15/2019 56* >60 mL/min/[1.73_m2] Final     GFR Estimate If Black 07/15/2019 65  >60 mL/min/[1.73_m2] Final     Calcium 07/15/2019 9.1  8.5 - 10.1 mg/dL Final   Hospital Outpatient Visit on 05/23/2019   Component Date Value Ref Range Status     Immunoglobulin G 05/23/2019 Canceled, Test credited   Final     Immunoglobulin G CSF 05/23/2019 Canceled, Test credited   Final     Immunoglobulin G CSF Index 05/23/2019 Canceled, Test credited   Final     Albumin by Nephelometry 05/23/2019 Canceled, Test credited   Final     Immunoglobulin G Albumin CSF 05/23/2019 Canceled, Test credited   Final     Immunoglobulin G Albumin Index 05/23/2019 Canceled, Test credited   Final     CSF Synthesis Rate 05/23/2019 Canceled, Test credited   Final     IgG Albumin Ration 05/23/2019 Canceled, Test credited   Final     VDRL CSF 05/23/2019 Non Reactive  Non Reactive Final     Glucose CSF 05/23/2019 58  40 - 70 mg/dL Final     Copath Report 05/23/2019    Final                    Value:Patient Name: JULIUS CASTELLANOS  MR#: [id number]  Specimen #: [id number]  Collected: 5/23/2019 11:15  Received: 5/23/2019 15:05  Reported: 5/24/2019 10:51  Ordering Phy(s): YANA OTERO    For improved result formatting, select 'View Enhanced Report Format' under   Linked Documents section.  _________________________________________    SPECIMEN(S):  Cerebrospinal fluid    INTERPRETATION:  Cerebrospinal fluid;       Rare to absent B cells       See comment    COMMENT:  There is no immunophenotypic evidence of B-cell non-Hodgkin lymphoma.   Neoplastic cells, including large cells,  may not survive specimen processing. The total number of cells is low   limiting the number of antibodies that  can be analyzed and limiting the interpretation. Only B-cell antigens were   evaluated. This sample may not be  representative. Final interpretation requires correlation with morphologic   and clinical features.    RESULTS:  Percentages  reported below are based on the total number of CD45                           positive   viable leukocytes.  B cells are rare to absent    Resident/Fellow Review by:  Dr. Noel Mata    A resident/fellow in an ACGME accredited training program was involved in   the selection of testing, review of  flow scattergrams, and/or interpretation of this case. I, as the senior   physician, attest that I: (i)  confirmed appropriate testing, (ii) examined the relevant flow   scattergrams for the specimen(s); and (ii)  rendered or confirmed the interpretation(s).    ANTIBODIES:  Nine-color analyses are performed for the following markers: CD5, CD10,   CD19, CD20, CD34, CD38, CD45,and kappa  and lambda immunoglobulin light chains. Cells are gated to isolate   populations (CD45 versus side scatter and  forward scatter versus side scatter), to exclude debris (forward scatter   versus side scatter) and to exclude  cell doublets (forward scatter height versus forward scatter width and   side scatter height versus side scatter  width). Forward scatter varies with cell size. S                          lewis scatter varies with   the amount of cytoplasmic granules.  Intensity for CD45 usually increases as hematolymphoid cells mature.    CLINICAL HISTORY:  64 year old female with abnormal cervical MRI hyperintense lesion and leg   weakness.    I have personally reviewed all specimens and/or slides, including the   listed special stains, and used them  with my medical judgment to determine the final diagnosis.    Electronically signed out by:    Elva Griffin M.D., [facility]    This test was developed and its performance characteristics determined by   [facility]. It has not been cleared or   approved by the US Food and Drug  Administration.  FDA does not require this test to go through premarket   FDA review. This test is used for  clinical purposes and should not  be regarded as investigational or for   research.  This laboratory is certified  under the Clinical Laboratory Improvement Amendments (CLIA) as qualified   t                          o perform high complexity clinical  laboratory testing.    CPT Codes:  A: 53237-SN, 77089-25-YCIR51(7), 85163-BEID2-36, 66624-83-SFMG64    TESTING LAB LOCATION:  [address]  [address] [place] [id number] [phone]    COLLECTION SITE:  Client:  [facility]  Location:  XRAY (S)       Lymes IgG CSF Immunoblot 05/23/2019 Negative  Negative Final     Lymes IgM CSF Immunoblot 05/23/2019 Negative  Negative Final     Protein Total CSF 05/23/2019 26  15 - 60 mg/dL Final     Immunoglobulin Serum IgG 05/23/2019 830  768 - 1,632 mg/dL Final     Oligoclonal IgG CSF 05/23/2019 2.2  0.0 - 6.0 mg/dL Final     Albumin by Nephelometry 05/23/2019 4,080  3,500 - 5,200 mg/dL Final     Oligoclonal Albumin CSF 05/23/2019 9  0 - 35 mg/dL Final     Oligoclonal Albumin Index 05/23/2019 2.2  0.0 - 9.0 ratio Final     Oligoclonal IgG Index 05/23/2019 1.20* 0.28 - 0.66 ratio Final     CSF IgG/Albumin Ratio 05/23/2019 0.24  0.09 - 0.25 ratio Final     Oligoclonal Banding 05/23/2019 Positive* Negative Final     CSF Oligoclonal Bands Number 05/23/2019 8* 0 - 1 Bands Final     IgG Synthesis Rate 05/23/2019 3.4  <=8.0 mg/d Final     Oligoclonal Interpretation 05/23/2019 SEE NOTE   Final     Specimen Description 05/23/2019 Cerebrospinal fluid   Final     Special Requests 05/23/2019 TUBE 3   Final     Culture Micro 05/23/2019 Culture negative after 4 weeks   Final     Specimen Description 05/23/2019 Cerebrospinal fluid   Final     Special Requests 05/23/2019 TUBE 3   Final     Gram Stain 05/23/2019 No organisms seen   Final     Gram Stain 05/23/2019    Final                    Value:Rare  WBC'S seen       Gram Stain 05/23/2019 No PMNs seen   Final     Gram Stain 05/23/2019    Final                     Value:Gram stain review consistent with reported results.  Gram stain slide reviewed at the [facility]       Gram Stain 05/23/2019    Final                    Value:Quantification of host cells and microbiological organisms was done on a cytocentrifuged   preparation.       Specimen Description 05/23/2019 Cerebrospinal fluid   Final     Special Requests 05/23/2019 TUBE 3   Final     Culture Micro 05/23/2019 No growth   Final     Copath Report 05/23/2019    Final                    Value:Patient Name: JULIUS CASTELLANOS  MR#: [id number]  Specimen #: [id number]  Collected: 5/23/2019  Received: 5/24/2019  Reported: 5/24/2019 12:02  Ordering Phy(s): YANA OTERO    For improved result formatting, select 'View Enhanced Report Format' under   Linked Documents section.    SPECIMEN/STAIN PROCESS:  Cerebrospinal Fluid       Pap-Cyto x 1, Steel's stain-cyto x 1    ----------------------------------------------------------------  CYTOLOGIC INTERPRETATION:     Cerebrospinal Fluid:   Negative for malignancy  Specimen Adequacy: Satisfactory for evaluation.    I have personally reviewed all specimens and/or slides, including the   listed special stains, and used them  with my medical judgement to determine or confirm the final diagnosis.    Electronically signed out by:    Abhinav Chow M.D.    CLINICAL HISTORY:  Hyperintense lesion and leg weakness.    ,    GROSS:  Cerebrospinal Fluid:  Received 0.8 ml of colorless, clear fluid, processed   as 1 Pap stained cytospin                           and 1  Steel stained cytospin.    MICROSCOPIC:  A microscopic examination is performed.    CPT Codes:  A: 47380-NMV, 18605-NMEODDH    COLLECTION SITE:  Client:  [facility]  Location:  [facility] (S)    The technical component of this testing was completed at the [facility], with the professional  component performed   at the [facility]  [facility]atory, [address] [facility] [place] [address] ([phone])         WBC CSF 05/23/2019 1  0 - 5 /uL Final     RBC CSF 05/23/2019 1  0 - 2 /uL Final     Tube Number 05/23/2019 4  # Final     Color CSF 05/23/2019 Colorless  CLRL^Colorless Final     Appearance CSF 05/23/2019 Clear  CLER^Clear Final     Lyme CSF Marychuy 05/23/2019 0.01  <=0.99 GIANNI Final     Specimen Description 05/23/2019 Cerebrospinal fluid   Final     AFB Stain 05/23/2019 Negative for acid fast bacteria   Final     AFB Stain 05/23/2019 Assayed at [facility] [address] [place] [address] [phone]   Final     ACE Angiotensin Conv Enz CSF 05/23/2019 1.5  0.0 - 2.5 U/L Final     Specimen Description 05/23/2019 Cerebrospinal fluid   Final     Culture Micro 05/23/2019 Culture negative for acid fast bacilli   Final     Culture Micro 05/23/2019 Assayed at [facility] [address] [place] [address] [phone]   Final   Orders Only on 05/10/2019   Component Date Value Ref Range Status     Sodium 05/10/2019 138  133 - 144 mmol/L Final     Potassium 05/10/2019 3.5  3.4 - 5.3 mmol/L Final     Chloride 05/10/2019 102  94 - 109 mmol/L Final     Carbon Dioxide 05/10/2019 28  20 - 32 mmol/L Final     Anion Gap 05/10/2019 8  3 - 14 mmol/L Final     Glucose 05/10/2019 87  70 - 99 mg/dL Final     Urea Nitrogen 05/10/2019 18  7 - 30 mg/dL Final     Creatinine 05/10/2019 0.86  0.52 - 1.04 mg/dL Final     GFR Estimate 05/10/2019 71  >60 mL/min/[1.73_m2] Final     GFR Estimate If Black 05/10/2019 82  >60 mL/min/[1.73_m2] Final     Calcium 05/10/2019 10.6* 8.5 - 10.1 mg/dL Final   Office Visit on 05/10/2019   Component Date Value Ref Range Status     Hemoglobin A1C 05/10/2019 5.7* 0 - 5.6 % Final     Vitamin E 05/10/2019 12.3  5.5 - 18.0 mg/L Final     Vitamin E Gamma 05/10/2019 1.3  0.0 - 6.0 mg/L Final     Vitamin B12 05/10/2019 1,269* 193 - 986 pg/mL Final      Methylmalonic Acid 05/10/2019 0.16  0.00 - 0.40 umol/L Final     Copper 05/10/2019 143  80 - 155 ug/dL Final     KD interpretation 05/10/2019 Negative  NEG^Negative Final     SSA (Ro) (LESLYE) Antibody, IgG 05/10/2019 <0.2  0.0 - 0.9 AI Final     SSB (La) (LESLYE) Antibody, IgG 05/10/2019 <0.2  0.0 - 0.9 AI Final   ]      MRI brain:    no new MRI to review    ASSESSMENT/PLAN:  The patient is a 64 year old woman with a past medical history of relapsing remitting/secondary progressive multiple sclerosis who is presenting today as a follow-up.  Overall, the patient is largely clinically improved since her last visit.  I think the bulk of this clinical improvement is her dedication to an exercise program.  I spent time encouraging her to continue with this exercise program and I explained the benefits of exercise and people with multiple sclerosis.  I also discussed several potential future therapies that look more people at the progressive stage of multiple sclerosis.  If the patient does well, I will have her follow-up in 6 months.  I instructed the patient to call my office with any questions, concerns, issues or problems    Follow up in 6 months    I spent 25 minutes in this visit, with >50% direct patient time spent counseling about prognosis, treatment options, and coordination of care.    Omar Francis MD, MD A [facility]  Staff Neurologist   10/15/19       (Chart documentation was completed in part with Dragon voice-recognition software. Even though reviewed, some grammatical, spelling, and word errors may remain.)

## 2019-10-15 NOTE — PROGRESS NOTES
HPI  64-year-old presents today for physical examination.  She has been doing quite well.  She was initially distressed over the diagnosis of MS but is managed to cope with this better recently in large part through increased exercise.  She is been exercising regularly alternating strength training and cardiovascular training and has been functioning well and doing well with this.  She is had no recent flares or increase in symptoms or problems in association with this.  She has had a history of depression for which she takes bupropion.  She is tried to reduce the dose of the bupropion on 2 separate occasions in the past and did not tolerate this well.  She is reluctant to try additional dosage tapering.  Said no side effects or ill effects related to this and no history of seizures.  We elected to continue on the present dose without not now or future dose adjustments.  She is current on her immunizations she is eating healthy trying to focus on a plant-based diet she is sleeping well.  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     Multiple sclerosis (H) 2019    secondary progressive     Osteoarthritis of carpometacarpal joint of thumb     bilaterally     Primary open angle glaucoma of both eyes, mild stage 12/01/2016     Past Surgical History:   Procedure Laterality Date     ORIF left ankle  08/20/2017     Family History   Problem Relation Age of Onset     Cerebrovascular Disease Mother      Depression Mother      Eye Disorder Mother         macular degeneration     Psychotic Disorder Mother      Respiratory Mother      Glaucoma Mother      Cancer - colorectal Father      Arthritis Father      Cardiovascular Father      Heart Disease Father      Thyroid Disease Father      Sleep Apnea Father      Eye Disorder  Maternal Grandfather      Glaucoma Maternal Grandfather      Macular Degeneration Maternal Grandfather      Cerebrovascular Disease Paternal Grandmother      Arthritis Paternal Grandmother      Unknown/Adopted Paternal Grandfather      Multiple Sclerosis No family hx of      Autoimmune Disease No family hx of      Social History     Socioeconomic History     Marital status:      Spouse name: None     Number of children: None     Years of education: None     Highest education level: None   Occupational History     None   Social Needs     Financial resource strain: None     Food insecurity:     Worry: None     Inability: None     Transportation needs:     Medical: None     Non-medical: None   Tobacco Use     Smoking status: Never Smoker     Smokeless tobacco: Never Used   Substance and Sexual Activity     Alcohol use: Yes     Alcohol/week: 5.0 standard drinks     Types: 6 Standard drinks or equivalent per week     Comment: 3 per week     Drug use: No     Sexual activity: Yes     Partners: Male   Lifestyle     Physical activity:     Days per week: None     Minutes per session: None     Stress: None   Relationships     Social connections:     Talks on phone: None     Gets together: None     Attends Buddhist service: None     Active member of club or organization: None     Attends meetings of clubs or organizations: None     Relationship status: None     Intimate partner violence:     Fear of current or ex partner: None     Emotionally abused: None     Physically abused: None     Forced sexual activity: None   Other Topics Concern     Parent/sibling w/ CABG, MI or angioplasty before 65F 55M? Not Asked      Service Not Asked     Blood Transfusions Not Asked     Caffeine Concern Not Asked     Occupational Exposure Not Asked     Hobby Hazards Not Asked     Sleep Concern Not Asked     Stress Concern Not Asked     Weight Concern Not Asked     Special Diet Not Asked     Back Care Not Asked     Exercise Yes      "Comment: run/walk 2-3 x/week     Bike Helmet Not Asked     Seat Belt Not Asked     Self-Exams Not Asked   Social History Narrative     None     Complete review of symptoms negative except as noted above.    Exam:  /77 (BP Location: Right arm, Patient Position: Sitting, Cuff Size: Adult Regular)   Pulse 75   Ht 1.651 m (5' 5\")   Wt 71.7 kg (158 lb)   SpO2 99%   BMI 26.29 kg/m    158 lbs 0 oz  PHYSICAL EXAMINATION:   The patient is alert, oriented with a clear sensorium.   Skin shows no lesions or rashes and good turgor.   Head is normocephalic and atraumatic.   Eyes show PERRLA. Fundi are benign.   Ears show normal TMs bilaterally.   Mouth shows clear oral mucosa.   Neck shows no nodes, thyromegaly or bruits.   Back is nontender.   Lungs are clear to percussion and auscultation.   Heart shows normal S1 and S2 without murmur or gallop.   Abdomen is soft, nontender without masses or organomegaly.   Extremities show no edema and no evidence of active synovitis.   Neurologic examination shows cranial nerves II-XII intact. Motor shows normal strength. Reflexes are full and symmetrical.     ASSESSMENT  1 MS stable  2 history of depression in remission on bupropion  3 borderline hyperlipidemia needs follow-up    Plan  She will follow-up with fasting lipids refilled her bupropion and have her reassessed in a year or follow-up sooner immediately if any increased symptoms or problems before that time    This note was completed using Dragon voice recognition software.  Although reviewed after completion, some word and grammatical errors may occur.    Maxim Marcum MD  General Internal Medicine  [facility]  [phone]        "

## 2019-10-30 DIAGNOSIS — R79.89 HIGH SERUM CALCIUM: ICD-10-CM

## 2019-10-30 DIAGNOSIS — E78.5 HYPERLIPIDEMIA, UNSPECIFIED HYPERLIPIDEMIA TYPE: ICD-10-CM

## 2019-10-30 DIAGNOSIS — N28.9 RENAL INSUFFICIENCY: ICD-10-CM

## 2019-10-30 LAB
ANION GAP SERPL CALCULATED.3IONS-SCNC: 4 MMOL/L (ref 3–14)
BUN SERPL-MCNC: 18 MG/DL (ref 7–30)
CALCIUM SERPL-MCNC: 9.4 MG/DL (ref 8.5–10.1)
CHLORIDE SERPL-SCNC: 103 MMOL/L (ref 94–109)
CHOLEST SERPL-MCNC: 214 MG/DL
CO2 SERPL-SCNC: 30 MMOL/L (ref 20–32)
CREAT SERPL-MCNC: 0.9 MG/DL (ref 0.52–1.04)
GFR SERPL CREATININE-BSD FRML MDRD: 67 ML/MIN/{1.73_M2}
GLUCOSE SERPL-MCNC: 90 MG/DL (ref 70–99)
HDLC SERPL-MCNC: 88 MG/DL
LDLC SERPL CALC-MCNC: 116 MG/DL
NONHDLC SERPL-MCNC: 125 MG/DL
POTASSIUM SERPL-SCNC: 4.1 MMOL/L (ref 3.4–5.3)
SODIUM SERPL-SCNC: 138 MMOL/L (ref 133–144)
TRIGL SERPL-MCNC: 46 MG/DL

## 2019-11-10 NOTE — TELEPHONE ENCOUNTER
DIAGNOSIS: planter warts on left foot-self referral   APPOINTMENT DATE: Dec 2, 2019    NOTES STATUS DETAILS   OFFICE NOTE from referring provider N/A    OFFICE NOTE from other specialist Internal 1/3/18 Dr Kerr   11/7/17 Dr. Watson   DISCHARGE SUMMARY from hospital N/A    DISCHARGE REPORT from the ER N/A    OPERATIVE REPORT Care Everywhere 8/25/17 Dr. Watson   MEDICATION LIST Internal    IMPLANT RECORD/STICKER N/A    LABS     CBC/DIFF N/A    CULTURES N/A    INJECTIONS DONE IN RADIOLOGY N/A    MRI N/A    CT SCAN N/A    XRAYS (IMAGES & REPORTS) Internal Internal 2018, 2017   TUMOR     PATHOLOGY  Slides & report N/A

## 2019-11-21 DIAGNOSIS — N28.9 RENAL INSUFFICIENCY: ICD-10-CM

## 2019-11-21 DIAGNOSIS — R79.89 HIGH SERUM CALCIUM: ICD-10-CM

## 2019-11-21 DIAGNOSIS — G35 MULTIPLE SCLEROSIS (H): ICD-10-CM

## 2019-11-21 LAB
ALBUMIN UR-MCNC: NEGATIVE MG/DL
APPEARANCE UR: CLEAR
BILIRUB UR QL STRIP: NEGATIVE
CALCIUM SERPL-MCNC: 9.2 MG/DL (ref 8.5–10.1)
COLOR UR AUTO: YELLOW
GLUCOSE UR STRIP-MCNC: NEGATIVE MG/DL
HGB UR QL STRIP: NEGATIVE
KETONES UR STRIP-MCNC: NEGATIVE MG/DL
LEUKOCYTE ESTERASE UR QL STRIP: NEGATIVE
MUCOUS THREADS #/AREA URNS LPF: PRESENT /LPF
NITRATE UR QL: NEGATIVE
PH UR STRIP: 6 PH (ref 5–7)
RBC #/AREA URNS AUTO: 4 /HPF (ref 0–2)
SOURCE: ABNORMAL
SP GR UR STRIP: 1.02 (ref 1–1.03)
SQUAMOUS #/AREA URNS AUTO: <1 /HPF (ref 0–1)
UROBILINOGEN UR STRIP-MCNC: 0 MG/DL (ref 0–2)
WBC #/AREA URNS AUTO: 2 /HPF (ref 0–5)

## 2019-11-29 NOTE — PROGRESS NOTES
Date of Service: 12/2/2019    Chief Complaint   Patient presents with     Consult     Plantar warts, left foot.          HPI: Poornima is a 64 year old female who presents today for further evaluation of left foot warts. Relates that these have been on the foot for years. She relates that these do cause her pain when she is walking. She relates that she has tried to cure the areas with OTC products to no avail. Relates that she has MS and does have some balance issues with walking distances.      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    SH:   Social History     Socioeconomic History     Marital status:      Spouse name: Not on file     Number of children: Not on file     Years of education: Not on file     Highest education level: Not on file   Occupational History     Not on file   Social Needs     Financial resource strain: Not on file     Food insecurity:     Worry: Not on file     Inability: Not on file     Transportation needs:     Medical: Not on file     Non-medical: Not on file   Tobacco Use     Smoking status: Never Smoker     Smokeless tobacco: Never Used   Substance and Sexual Activity     Alcohol use: Yes     Alcohol/week: 5.0 standard drinks     Types: 6 Standard drinks or equivalent per week     Comment: 3 per week     Drug use: No     Sexual activity: Yes     Partners: Male   Lifestyle     Physical activity:      Days per week: Not on file     Minutes per session: Not on file     Stress: Not on file   Relationships     Social connections:     Talks on phone: Not on file     Gets together: Not on file     Attends Voodoo service: Not on file     Active member of club or organization: Not on file     Attends meetings of clubs or organizations: Not on file     Relationship status: Not on file     Intimate partner violence:     Fear of current or ex partner: Not on file     Emotionally abused: Not on file     Physically abused: Not on file     Forced sexual activity: Not on file   Other Topics Concern     Parent/sibling w/ CABG, MI or angioplasty before 65F 55M? Not Asked      Service Not Asked     Blood Transfusions Not Asked     Caffeine Concern Not Asked     Occupational Exposure Not Asked     Hobby Hazards Not Asked     Sleep Concern Not Asked     Stress Concern Not Asked     Weight Concern Not Asked     Special Diet Not Asked     Back Care Not Asked     Exercise Yes     Comment: run/walk 2-3 x/week     Bike Helmet Not Asked     Seat Belt Not Asked     Self-Exams Not Asked   Social History Narrative     Not on file       FH:   Family History   Problem Relation Age of Onset     Cerebrovascular Disease Mother      Depression Mother      Eye Disorder Mother         macular degeneration     Psychotic Disorder Mother      Respiratory Mother      Glaucoma Mother      Cancer - colorectal Father      Arthritis Father      Cardiovascular Father      Heart Disease Father      Thyroid Disease Father      Sleep Apnea Father      Eye Disorder Maternal Grandfather      Glaucoma Maternal Grandfather      Macular Degeneration Maternal Grandfather      Cerebrovascular Disease Paternal Grandmother      Arthritis Paternal Grandmother      Unknown/Adopted Paternal Grandfather      Multiple Sclerosis No family hx of      Autoimmune Disease No family hx of        Objective:  Data Unavailable Data Unavailable Data Unavailable Data  Unavailable Data Unavailable 0 lbs 0 oz    PT and DP pulses are 2/4 bilaterally. CRT is instant. Positive pedal hair.   Gross sensation is intact bilaterally.   Equinus isn oted bilaterally. No pain with active or passive ROM of the ankle, MTJ, 1st ray, or halluces bilaterally. Pes planus with hammertoes noted BL. Fat pad atrophy on the met heads on the left. Hyperkeratotic lesions on the left metatarsal heads.  Nails normal bilaterally. No open lesions are noted.     Assessment: Poornima is a 65 YO with MS with left foot hyperkeratotic lesions 2/2 increased pressure from fat pad atrophy and hammertoes.     Plan:  - Pt seen and evaluated.  - Recommend a Budin splint on the toes. She related relief with these. If she cannot tolerated these in the long term, we can make her a pair of orthotics. However, I would delay these for a long as possible, as she already has balance issues with long walks.   - Rx for urea cream.  - See again PRN.

## 2019-12-02 ENCOUNTER — OFFICE VISIT (OUTPATIENT)
Dept: ORTHOPEDICS | Facility: CLINIC | Age: 64
End: 2019-12-02
Payer: MEDICARE

## 2019-12-02 ENCOUNTER — PRE VISIT (OUTPATIENT)
Dept: ORTHOPEDICS | Facility: CLINIC | Age: 64
End: 2019-12-02

## 2019-12-02 DIAGNOSIS — L84 TYLOMA: Primary | ICD-10-CM

## 2019-12-02 RX ORDER — UREA 40 %
CREAM (GRAM) TOPICAL DAILY
Qty: 85 G | Refills: 11 | Status: SHIPPED | OUTPATIENT
Start: 2019-12-02

## 2019-12-02 ASSESSMENT — PATIENT HEALTH QUESTIONNAIRE - PHQ9: SUM OF ALL RESPONSES TO PHQ QUESTIONS 1-9: 1

## 2019-12-02 NOTE — NURSING NOTE
Reason For Visit:   Chief Complaint   Patient presents with     Consult     Plantar warts, left foot.        Pain Assessment  Patient Currently in Pain: Other (Comment)(Patient stated that she has pain in her left foot when she walks. )        No Known Allergies        Annmarie Montemayor LPN

## 2019-12-02 NOTE — LETTER
RE: Poornima Hilton  [address] [place] [address]     Dear Colleague,    Thank you for referring your patient, Poornima Hilton, to the [facility] ORTHOPAED[facility]INIC at [facility]. Please see a copy of my visit note below.    Date of Service: 12/2/2019    Chief Complaint   Patient presents with     Consult     Plantar warts, left foot.          HPI: Poornima is a 64 year old female who presents today for further evaluation of left foot warts. Relates that these have been on the foot for years. She relates that these do cause her pain when she is walking. She relates that she has tried to cure the areas with OTC products to no avail. Relates that she has MS and does have some balance issues with walking distances.      Review of Systems: No n/v/d/f/c/ns/sob/cp    PMH:   Past Medical History:   Diagnosis Date     Closed left ankle fracture 08/19/2017     Depressive disorder, not elsewhere classified     Depression (non-psychotic)     Guillaine [place] syndrome 2000     Mild recurrent major depression (H) 10/1/2012     Multiple sclerosis (H) 2019    secondary progressive     Osteoarthritis of carpometacarpal joint of thumb     bilaterally     Primary open angle glaucoma of both eyes, mild stage 12/01/2016       PSxH:   Past Surgical History:   Procedure Laterality Date     ORIF left ankle  08/20/2017       Allergies: Patient has no known allergies.    SH:   Social History     Socioeconomic History     Marital status:      Spouse name: Not on file     Number of children: Not on file     Years of education: Not on file     Highest education level: Not on file   Occupational History     Not on file   Social Needs     Financial resource strain: Not on file     Food insecurity:     Worry: Not on file     Inability: Not on file     Transportation needs:     Medical: Not on file     Non-medical: Not on file   Tobacco Use     Smoking status: Never Smoker     Smokeless tobacco:  Never Used   Substance and Sexual Activity     Alcohol use: Yes     Alcohol/week: 5.0 standard drinks     Types: 6 Standard drinks or equivalent per week     Comment: 3 per week     Drug use: No     Sexual activity: Yes     Partners: Male   Lifestyle     Physical activity:     Days per week: Not on file     Minutes per session: Not on file     Stress: Not on file   Relationships     Social connections:     Talks on phone: Not on file     Gets together: Not on file     Attends Yazidi service: Not on file     Active member of club or organization: Not on file     Attends meetings of clubs or organizations: Not on file     Relationship status: Not on file     Intimate partner violence:     Fear of current or ex partner: Not on file     Emotionally abused: Not on file     Physically abused: Not on file     Forced sexual activity: Not on file   Other Topics Concern     Parent/sibling w/ CABG, MI or angioplasty before 65F 55M? Not Asked      Service Not Asked     Blood Transfusions Not Asked     Caffeine Concern Not Asked     Occupational Exposure Not Asked     Hobby Hazards Not Asked     Sleep Concern Not Asked     Stress Concern Not Asked     Weight Concern Not Asked     Special Diet Not Asked     Back Care Not Asked     Exercise Yes     Comment: run/walk 2-3 x/week     Bike Helmet Not Asked     Seat Belt Not Asked     Self-Exams Not Asked   Social History Narrative     Not on file       FH:   Family History   Problem Relation Age of Onset     Cerebrovascular Disease Mother      Depression Mother      Eye Disorder Mother         macular degeneration     Psychotic Disorder Mother      Respiratory Mother      Glaucoma Mother      Cancer - colorectal Father      Arthritis Father      Cardiovascular Father      Heart Disease Father      Thyroid Disease Father      Sleep Apnea Father      Eye Disorder Maternal Grandfather      Glaucoma Maternal Grandfather      Macular Degeneration Maternal Grandfather       Cerebrovascular Disease Paternal Grandmother      Arthritis Paternal Grandmother      Unknown/Adopted Paternal Grandfather      Multiple Sclerosis No family hx of      Autoimmune Disease No family hx of        Objective:  Data Unavailable Data Unavailable Data Unavailable Data Unavailable Data Unavailable 0 lbs 0 oz    PT and DP pulses are 2/4 bilaterally. CRT is instant. Positive pedal hair.   Gross sensation is intact bilaterally.   Equinus isn oted bilaterally. No pain with active or passive ROM of the ankle, MTJ, 1st ray, or halluces bilaterally. Pes planus with hammertoes noted BL. Fat pad atrophy on the met heads on the left. Hyperkeratotic lesions on the left metatarsal heads.  Nails normal bilaterally. No open lesions are noted.     Assessment: Poornima is a 65 YO with MS with left foot hyperkeratotic lesions 2/2 increased pressure from fat pad atrophy and hammertoes.     Plan:  - Pt seen and evaluated.  - Recommend a Budin splint on the toes. She related relief with these. If she cannot tolerated these in the long term, we can make her a pair of orthotics. However, I would delay these for a long as possible, as she already has balance issues with long walks.   - Rx for urea cream.  - See again PRN.     Again, thank you for allowing me to participate in the care of your patient.      Sincerely,    Jean Carlos Cason DPM

## 2019-12-05 ENCOUNTER — OFFICE VISIT (OUTPATIENT)
Dept: NEUROLOGY | Facility: CLINIC | Age: 64
End: 2019-12-05
Attending: NURSE PRACTITIONER
Payer: MEDICARE

## 2019-12-05 VITALS
BODY MASS INDEX: 26.29 KG/M2 | SYSTOLIC BLOOD PRESSURE: 127 MMHG | DIASTOLIC BLOOD PRESSURE: 83 MMHG | HEART RATE: 77 BPM | HEIGHT: 65 IN | OXYGEN SATURATION: 97 %

## 2019-12-05 DIAGNOSIS — G35 MULTIPLE SCLEROSIS (H): Primary | ICD-10-CM

## 2019-12-05 DIAGNOSIS — E83.52 HYPERCALCEMIA: Primary | ICD-10-CM

## 2019-12-05 DIAGNOSIS — E55.9 VITAMIN D DEFICIENCY, UNSPECIFIED: ICD-10-CM

## 2019-12-05 DIAGNOSIS — N28.9 RENAL INSUFFICIENCY: ICD-10-CM

## 2019-12-05 DIAGNOSIS — G35 MULTIPLE SCLEROSIS (H): ICD-10-CM

## 2019-12-05 DIAGNOSIS — R79.89 HIGH SERUM CALCIUM: ICD-10-CM

## 2019-12-05 LAB
CALCIUM SERPL-MCNC: 10.2 MG/DL (ref 8.5–10.1)
DEPRECATED CALCIDIOL+CALCIFEROL SERPL-MC: 64 UG/L (ref 20–75)

## 2019-12-05 PROCEDURE — G0463 HOSPITAL OUTPT CLINIC VISIT: HCPCS

## 2019-12-05 PROCEDURE — 82310 ASSAY OF CALCIUM: CPT | Performed by: NURSE PRACTITIONER

## 2019-12-05 PROCEDURE — 36415 COLL VENOUS BLD VENIPUNCTURE: CPT | Performed by: NURSE PRACTITIONER

## 2019-12-05 PROCEDURE — 82306 VITAMIN D 25 HYDROXY: CPT | Performed by: NURSE PRACTITIONER

## 2019-12-05 RX ORDER — AMOXICILLIN 500 MG/1
CAPSULE ORAL
Refills: 0 | COMMUNITY
Start: 2019-12-03 | End: 2020-04-08

## 2019-12-05 ASSESSMENT — PAIN SCALES - GENERAL: PAINLEVEL: NO PAIN (0)

## 2019-12-05 NOTE — PATIENT INSTRUCTIONS
1. Check vit D level today.    2. Follow up with Dr. Quiroz in April as scheduled.     3. Orthotics referral for Right AFO.

## 2019-12-05 NOTE — LETTER
12/5/2019       RE: Poornima Hilton  [address] [place] [address]     Dear Colleague,    Thank you for referring your patient, Poornima Hilton, to the [facility] MULTIPLE SCLEROSIS at [facility]. Please see a copy of my visit note below.    [facility] OUTPATIENT MULTIPLE SCLEROSIS CLINIC VISIT NOTE      REASON FOR VISIT: Poornima is a 64 year old female who presents to the clinic today for regularly scheduled follow up of Multiple Sclerosis. She was most recently seen by Dr. Francis on 10/15/2019. She presents to the evaluation alone.     HISTORY OF PRESENT ILLNESS: Please refer to prior clinic notes by Dr. Francis for details.     INTERVAL HISTORY SINCE LAST VISIT: Most recent visit with Dr. Francis was on 10/15/2019. She was stable at that time and recommended a 6 months follow up. Then on 11/12/2019, patient contacted the clinic with episodes of dizziness, headache, increased tingling in her hands and feet. Urinalysis was completed on 11/21 and was negative. She is here to further discuss these symptoms.     Today, patient reports that the episodes of dizziness and headache are completely resolved. When she experienced it, she rated that as mild. She denies any vertigo, visual changes, hearing issues during these episodes. She reports constant n/t in her hands and feet. She also reports a slight increase in her right eg weakness which is more noticeable after long distance walking.     Her general health has been stable. No recent hospitalization or illness. Patient denies any new changes in vision or sensation suggestive of new relapse of multiple sclerosis since she was last seen here.    She is not on any medications for her Multiple Sclerosis. Medications were discussed during her prior visits, but patient ultimately decided to be off of DMTs. She is right handed. She feels like her right sided extremities are slightly weaker than her left. She reports mild-  "moderate balance issues. She continues to have one fall a month. She recently completed some PT sessions and currently tries to do those exercises at home. She uses a cane or a walking stick for distances more than half a mile.     She currently uses Wellbutrin 100 mg twice daily for mood. Reports that her current mood is stable. She rates her fatigue as mild. No major issues with spasticity, insomnia or bowel. She has neurogenic bladder with mild frequency, hesitancy. Denies urgency.     PAST MEDICAL/SURGICAL HISTORY:   1. Glaucoma  2. Multiple Sclerosis   3. Left ankle surgery    FAMILY HISTORY: Negative for Multiple Sclerosis.     SOCIAL HISTORY: She is , they have one adult daughter. She retired last spring after working as a  for [employer]. She is very excited about her alf life. She denies smoking and recreational drug use. Occasional alcohol use.     6/2019  MRI Brain:  Impression: The study demonstrates 15-20 foci of T2-hyperintensity  within the cerebral white matter that raise the question of underlying  demyelinating disease. No definite interval demyelination.    Vit D: 1000 international unit(s) daily.     PHYSICAL EXAMINATION:   VITAL SIGNS: /83   Pulse 77   Ht 1.651 m (5' 5\")   SpO2 97%   BMI 26.29 kg/m      MENTAL STATUS: Alert,awake and oriented times four.   CRANIAL NERVES: Visual fields are full to confrontation. The pupils are equal, round and react to light and there is no Tano Ta pupil. Funduscopic examination demonstrates no optic pallor, papilledema or retinal hemorrhage/exudate. Extraocular movements are intact with no  internuclear ophthalmoplegia. No nystagmus. Facial strength and sensation are  normal. Hearing is  normal. Palate elevation and tongue protrusion are  normal.   POWER: Strength is as follows in the following muscles/groups (Right/Left,MRC scale): Shoulder abduction 5/5, elbow flexion 5/5, elbow extension 5/5, wrist extension 5/5, digit extension " "5/5, digit flexion 5/5, Hip flexion 4+/5, knee extension 5/5, knee flexion 5/5, foot dorsiflexion 4+/5.   SENSORY: Intact to light touch.  REFLEXES: Reflexes are normal, present and symmetric at biceps, triceps, brachioradialis, knees and ankles.   MOTOR/CEREBELLAR: There are no tremors, myoclonus or other abnormal movements. Tone is within normal limits in the limbs. There is no appendicular ataxia on finger-to-nose testing and rapid alternating movements are normal in the extremities. There is no pronator drift. Mild swaying on Romberg.   GAIT: Gait is narrow-based and steady and the patient is able to walk in tandem without difficulty.      ASSESSMENT/ PLAN:   1. Multiple Sclerosis: Initial symptoms of n/t in her hands and feet in 2000. Subsequently was diagnosed with \"GBS\". Had similar episodes in 2008 and 2014. Lost follow up between 2014- 2019. Evaluated by Dr. Francis in 6/2019 and was diagnosed with Multiple Sclerosis later on. Different medications were discussed and ultimately decided to remain off of DMTs.     Today's exam shows a mild weakness in her right sided extremities and is consistent with what patient is experiencing. This is not a new symptom. We discussed a right foot AFO and she is interested in it. Orthotics referral was ordered. Patient will follow up with Dr. Quiroz in April as scheduled.     2. Vit D level: Continue current dose.     3. Please contact us with questions or concerns.     Negra De La Cruz CNP  [facility] Neurology    I spent 55 minutes with this patient today, greater than 50% of which was spent in counseling and coordination of care.     (Chart documentation was completed in part with Dragon voice-recognition software. Even though reviewed, some grammatical, spelling, and word errors may remain.)      "

## 2019-12-05 NOTE — PROGRESS NOTES
[facility] OUTPATIENT MULTIPLE SCLEROSIS CLINIC VISIT NOTE      REASON FOR VISIT: Poornima is a 64 year old female who presents to the clinic today for regularly scheduled follow up of Multiple Sclerosis. She was most recently seen by Dr. Francis on 10/15/2019. She presents to the evaluation alone.     HISTORY OF PRESENT ILLNESS: Please refer to prior clinic notes by Dr. Francis for details.     INTERVAL HISTORY SINCE LAST VISIT: Most recent visit with Dr. Francis was on 10/15/2019. She was stable at that time and recommended a 6 months follow up. Then on 11/12/2019, patient contacted the clinic with episodes of dizziness, headache, increased tingling in her hands and feet. Urinalysis was completed on 11/21 and was negative. She is here to further discuss these symptoms.     Today, patient reports that the episodes of dizziness and headache are completely resolved. When she experienced it, she rated that as mild. She denies any vertigo, visual changes, hearing issues during these episodes. She reports constant n/t in her hands and feet. She also reports a slight increase in her right eg weakness which is more noticeable after long distance walking.     Her general health has been stable. No recent hospitalization or illness. Patient denies any new changes in vision or sensation suggestive of new relapse of multiple sclerosis since she was last seen here.    She is not on any medications for her Multiple Sclerosis. Medications were discussed during her prior visits, but patient ultimately decided to be off of DMTs. She is right handed. She feels like her right sided extremities are slightly weaker than her left. She reports mild- moderate balance issues. She continues to have one fall a month. She recently completed some PT sessions and currently tries to do those exercises at home. She uses a cane or a walking stick for distances more than half a mile.     She currently uses Wellbutrin 100 mg twice daily for  "mood. Reports that her current mood is stable. She rates her fatigue as mild. No major issues with spasticity, insomnia or bowel. She has neurogenic bladder with mild frequency, hesitancy. Denies urgency.     PAST MEDICAL/SURGICAL HISTORY:   1. Glaucoma  2. Multiple Sclerosis   3. Left ankle surgery    FAMILY HISTORY: Negative for Multiple Sclerosis.     SOCIAL HISTORY: She is , they have one adult daughter. She retired last spring after working as a  for [employer]. She is very excited about her nursing home life. She denies smoking and recreational drug use. Occasional alcohol use.     6/2019  MRI Brain:  Impression: The study demonstrates 15-20 foci of T2-hyperintensity  within the cerebral white matter that raise the question of underlying  demyelinating disease. No definite interval demyelination.    Vit D: 1000 international unit(s) daily.     PHYSICAL EXAMINATION:   VITAL SIGNS: /83   Pulse 77   Ht 1.651 m (5' 5\")   SpO2 97%   BMI 26.29 kg/m     MENTAL STATUS: Alert,awake and oriented times four.   CRANIAL NERVES: Visual fields are full to confrontation. The pupils are equal, round and react to light and there is no Tano Ta pupil. Funduscopic examination demonstrates no optic pallor, papilledema or retinal hemorrhage/exudate. Extraocular movements are intact with no  internuclear ophthalmoplegia. No nystagmus. Facial strength and sensation are  normal. Hearing is  normal. Palate elevation and tongue protrusion are  normal.   POWER: Strength is as follows in the following muscles/groups (Right/Left,MRC scale): Shoulder abduction 5/5, elbow flexion 5/5, elbow extension 5/5, wrist extension 5/5, digit extension 5/5, digit flexion 5/5, Hip flexion 4+/5, knee extension 5/5, knee flexion 5/5, foot dorsiflexion 4+/5.   SENSORY: Intact to light touch.  REFLEXES: Reflexes are normal, present and symmetric at biceps, triceps, brachioradialis, knees and ankles.   MOTOR/CEREBELLAR: There are no " "tremors, myoclonus or other abnormal movements. Tone is within normal limits in the limbs. There is no appendicular ataxia on finger-to-nose testing and rapid alternating movements are normal in the extremities. There is no pronator drift. Mild swaying on Romberg.   GAIT: Gait is narrow-based and steady and the patient is able to walk in tandem without difficulty.      ASSESSMENT/ PLAN:   1. Multiple Sclerosis: Initial symptoms of n/t in her hands and feet in 2000. Subsequently was diagnosed with \"GBS\". Had similar episodes in 2008 and 2014. Lost follow up between 2014- 2019. Evaluated by Dr. Francis in 6/2019 and was diagnosed with Multiple Sclerosis later on. Different medications were discussed and ultimately decided to remain off of DMTs.     Today's exam shows a mild weakness in her right sided extremities and is consistent with what patient is experiencing. This is not a new symptom. We discussed a right foot AFO and she is interested in it. Orthotics referral was ordered. Patient will follow up with Dr. Quiroz in April as scheduled.     2. Vit D level: Continue current dose.     3. Please contact us with questions or concerns.     Negra De La Cruz, CNP  [facility] Neurology          I spent 55 minutes with this patient today, greater than 50% of which was spent in counseling and coordination of care.     (Chart documentation was completed in part with Dragon voice-recognition software. Even though reviewed, some grammatical, spelling, and word errors may remain.)    "

## 2020-01-06 ENCOUNTER — DOCUMENTATION ONLY (OUTPATIENT)
Dept: ORTHOPEDICS | Facility: CLINIC | Age: 65
End: 2020-01-06

## 2020-01-08 NOTE — PROGRESS NOTES
"S: Patient is a 63 y/o female, 5'5\", 158 lbs seen at our [facility] for the evaluation and delivery of a Day Blue Rocker 2.0 OTS AFO on orders from MAHOGANY Vazquez CNP for the treatment of Dx: Multiple sclerosis (H) [G35].     O: Patient arrived walking under her own power to today's appointment. patient reports that she finds that her right leg becomes weak after extended ambulation and that she often catches her foot during gait when this occurs. This is common in MS that fatigue will cause foot drop and weakness to appear when at a state of rest MMT tested is WNL. Patient ROM and MMT scores for her foot and ankle are all WNL at the time of our visit.     G: Patient's right OTS carbon fiber AFO will assist the patient with clearing the floor and resorting dorsiflexion to her biomechanical gait in order to assist her in avoiding catching her foot and falls in the future when fatigue sets in.     A: I fit the patient with multiple sizes of Blue Rocker 2.0 AFOs and found the size medium to fit the best. Foot plate of the AFO was trimmed in order to provide optimal fit for patient. Patient was able to ambulate in our hallway and was very excited at the results of her new AFO. Patient liked the anterior shell of the AFO and was advised that it will help stabilize her with her quad weakness when fatigue sets in. Patient noted that she felt more sturdy standing with her AFO at the time of fitting even without fatigue setting in. Patient and I then discussed care and use of her new AFO and she was provided with written care and use instructions from the . Patient reported she understood all instructions and that all of her questions were answered at the end of our appointment.     P: Patient was given my card and asked to call our office if there are any questions or concerns regarding the fit/function of her AFO in the future.     "

## 2020-01-28 DIAGNOSIS — H40.1132 PRIMARY OPEN ANGLE GLAUCOMA OF BOTH EYES, MODERATE STAGE: ICD-10-CM

## 2020-01-29 NOTE — TELEPHONE ENCOUNTER
Last Clinic Visit: 8/27/19 with recommended 6-8 month follow up.  No visits scheduled  Last clinic note: Simbrinza (Brinzolamide/Brimonidine) which is a white top drop 2x/day (12 hours apart) in the LEFT EYE ONLY

## 2020-01-31 ENCOUNTER — OFFICE VISIT (OUTPATIENT)
Dept: OPHTHALMOLOGY | Facility: CLINIC | Age: 65
End: 2020-01-31
Attending: OPHTHALMOLOGY
Payer: MEDICARE

## 2020-01-31 DIAGNOSIS — H40.1132 PRIMARY OPEN ANGLE GLAUCOMA OF BOTH EYES, MODERATE STAGE: Primary | ICD-10-CM

## 2020-01-31 DIAGNOSIS — H25.13 NUCLEAR SENILE CATARACT OF BOTH EYES: ICD-10-CM

## 2020-01-31 PROCEDURE — G0463 HOSPITAL OUTPT CLINIC VISIT: HCPCS | Mod: ZF

## 2020-01-31 PROCEDURE — 92133 CPTRZD OPH DX IMG PST SGM ON: CPT | Mod: ZF | Performed by: OPHTHALMOLOGY

## 2020-01-31 PROCEDURE — 92083 EXTENDED VISUAL FIELD XM: CPT | Mod: ZF | Performed by: OPHTHALMOLOGY

## 2020-01-31 ASSESSMENT — VISUAL ACUITY
OD_CC+: -1
CORRECTION_TYPE: GLASSES
OD_CC: 20/25
OS_PH_CC: 20/20
OS_CC: 20/30
METHOD: SNELLEN - LINEAR
OS_CC+: -1

## 2020-01-31 ASSESSMENT — EXTERNAL EXAM - RIGHT EYE: OD_EXAM: NORMAL

## 2020-01-31 ASSESSMENT — TONOMETRY
IOP_METHOD: APPLANATION
OD_IOP_MMHG: 15
OS_IOP_MMHG: 13

## 2020-01-31 ASSESSMENT — CONF VISUAL FIELD
OS_NORMAL: 1
METHOD: COUNTING FINGERS
OD_NORMAL: 1

## 2020-01-31 ASSESSMENT — CUP TO DISC RATIO
OD_RATIO: 0.7
OS_RATIO: 0.8

## 2020-01-31 ASSESSMENT — EXTERNAL EXAM - LEFT EYE: OS_EXAM: NORMAL

## 2020-01-31 ASSESSMENT — SLIT LAMP EXAM - LIDS
COMMENTS: NORMAL
COMMENTS: NORMAL

## 2020-01-31 NOTE — PROGRESS NOTES
1)POAG/LTG vs Glc Suspect -- H/O DH OS, ??H/O Raynaud's per old notes, H/O low BP, anemia in past -- K pachy: 583/589   Tmax:20/19     HVF: OD:Full c fluct and OS:?early sup arcuate      CDR: 0.7/0.8    HRT/OCT:OD:tr RNFL thinning (fairly stable from 9035-1956) and OS:Mild RNFL thinning (prog from 9206-4826)      FHX of Glc: Mother -- had surgery, lost vision     Gonio: open      Intolerant to:      Asthma/COPD: No  Steroid Use:No     Kidney Stones:  No   Sulfa Allergy: No     IOP targets:  2)NS OU  3)H/O MS -- following with Neurology     MD:Started Simbrinza in 11/2017    Patient will continue on Latanoprost which is a teal top drop at bedtime in both eyes and Simbrinza (Brinzolamide/Brimonidine) which is a white top drop 2x/day (12 hours apart) in the LEFT EYE ONLY.    Patient will return to clinic in 6-8 months with repeat IOP check and visual field test (LEFT EYE FIRST).        Attending Physician Attestation:  Complete documentation of historical and exam elements from today's encounter can be found in the full encounter summary report (not reduplicated in this progress note). I personally obtained the chief complaint(s) and history of present illness.  I confirmed and edited as necessary the review of systems, past medical/surgical history, family history, social history, and examination findings as documented by others; and I examined the patient myself. I personally reviewed the relevant tests, images, and reports as documented above. I formulated and edited as necessary the assessment and plan and discussed the findings and management plan with the patient and family.  - Felicia Abreu MD

## 2020-01-31 NOTE — NURSING NOTE
Chief Complaint(s) and History of Present Illness(es)     Glaucoma Follow-Up     In both eyes.  Associated symptoms include Negative for dryness, eye pain, redness and tearing.  Pain was noted as 0/10.              Comments     5 month f/u for POAG, BE. For the most part vision seems about the same, but pt feels like her rx might have changed (a little blurry with glasses).     Ocular meds: Latanoprost at bedtime BE, Simbrinza BID JUAN Ellis, [facility] 8:28 AM January 31, 2020

## 2020-01-31 NOTE — PATIENT INSTRUCTIONS
Patient will continue on Latanoprost which is a teal top drop at bedtime in both eyes and Simbrinza (Brinzolamide/Brimonidine) which is a white top drop 2x/day (12 hours apart) in the LEFT EYE ONLY.    Patient will return to clinic in 6-8 months with repeat IOP check and visual field test (LEFT EYE FIRST).

## 2020-04-08 ENCOUNTER — VIRTUAL VISIT (OUTPATIENT)
Dept: NEUROLOGY | Facility: CLINIC | Age: 65
End: 2020-04-08
Attending: PSYCHIATRY & NEUROLOGY
Payer: COMMERCIAL

## 2020-04-08 DIAGNOSIS — G35 MULTIPLE SCLEROSIS (H): Primary | ICD-10-CM

## 2020-04-08 RX ORDER — DIAZEPAM 5 MG
5 TABLET ORAL ONCE
Status: DISCONTINUED | OUTPATIENT
Start: 2020-04-08 | End: 2020-04-08

## 2020-04-08 RX ORDER — ASPIRIN 81 MG/1
81 TABLET ORAL DAILY
COMMUNITY

## 2020-04-08 NOTE — PROGRESS NOTES
"Poornima Hilton is a 65 year old female who is being evaluated via a billable phone visit.      The patient has been notified of following:     \"This telephone visit will be conducted via a call between you and your physician/provider. We have found that certain health care needs can be provided without the need for a physical exam.  This service lets us provide the care you need with a short phone conversation.  If a prescription is necessary we can send it directly to your pharmacy.  If lab work is needed we can place an order for that and you can then stop by our lab to have the test done at a later time.    Telephone visits are billed at different rates depending on your insurance coverage. During this emergency period, for some insurers they may be billed the same as an in-person visit.  Please reach out to your insurance provider with any questions.    If during the course of the call the physician/provider feels a telephone visit is not appropriate, you will not be charged for this service.\"    Patient has given verbal consent for Telephone visit?  Yes    Poornima Hilton complains of    Chief Complaint   Patient presents with     Follow Up     MS       I have reviewed and updated the patient's Past Medical History, Social History, Family History and Medication List.    ALLERGIES  Patient has no known allergies.    Additional provider notes: This is a NEW patient visit for me.    PRINCIPAL NEUROLOGIC DIAGNOSIS: Multiple Sclerosis   Date of Onset: 2000   Date of Diagnosis: 2019   Initial Clinical Course: Relapsing Remitting   Current Clinical Course: SP active   Past Disease Modifying Therapy(ies): NA   Current Disease Modifying Therapy(ies):NA   Most Recent MRI of the Brain: 6/11/19   Most Recent MRI of the Cervical Cord 5/7/19   Most Recent MRI of the Thoracic Cord: 5/13/19   Most Recent Lumbar Puncture: 5/22/19 (RBC 1, WBC 1, Glucose 58, Protein 58, 8 OCB)    She was a patient of Dr. Francis and was recently diagnosed " "with MS on 6-19. She reports that between 2000 and 2008 she only had  Intermittent paresthesias in hands and feet.  At that time a work up in [place] was negative (brain MRI). In 2014 symptoms were more severe after exertion (exercise mostly). She also reports that those symptoms expeirenced in 2014 were constant at one point and improved which is typical an MS exacerbation. EMG obtained at the [facility] was normal but she was told it was likely peripheral neuropathy. In 2019 she experienced R sided weakness worse when exerting and the diagnosis was made by Dr Francis. She is on no DMT.     At current baseline she has mild R LE weakness. No arm weakness but some constant numbness in the fingers bilaterally for the past 20 years. Last summer she was unusually tired for the first time. Currently she walks 1-2 miles a day and does Yoga twice a week, occasionally does exercises recommended by PT. No other issues or concerns. Her main overall concern (when specifically asked) is to be able to continue her ability to hike, walk and  her grandchild. Denies recent falls.      She saw HUBERT Omer in 12-19 due to episodes of vertigo which had resoled by the time she was evaluated, the following is an exert of Negra's note:    \"Most recent visit with Dr. Francis was on 10/15/2019. She was stable at that time and recommended a 6 months follow up. Then on 11/12/2019, patient contacted the clinic with episodes of dizziness, headache, increased tingling in her hands and feet. Urinalysis was completed on 11/21 and was negative. She is here to further discuss these symptoms.      Today, patient reports that the episodes of dizziness and headache are completely resolved. When she experienced it, she rated that as mild. She denies any vertigo, visual changes, hearing issues during these episodes. She reports constant n/t in her hands and feet. She also reports a slight increase in her right eg weakness which is more noticeable " after long distance walking.      Her general health has been stable. No recent hospitalization or illness. Patient denies any new changes in vision or sensation suggestive of new relapse of multiple sclerosis since she was last seen here.     She is not on any medications for her Multiple Sclerosis. Medications were discussed during her prior visits, but patient ultimately decided to be off of DMTs. She is right handed. She feels like her right sided extremities are slightly weaker than her left. She reports mild- moderate balance issues. She continues to have one fall a month. She recently completed some PT sessions and currently tries to do those exercises at home. She uses a cane or a walking stick for distances more than half a mile.      She currently uses Wellbutrin 100 mg twice daily for mood. Reports that her current mood is stable. She rates her fatigue as mild. No major issues with spasticity, insomnia or bowel. She has neurogenic bladder with mild frequency, hesitancy. Denies urgency.     Today we discussed the fact that she is most ;likely in the SPMS phase and clearly has had progression in the past 2 years so we also discussed the  importance of preventing MS from progressing as much as possible.     I recommended high dose Biotin 100 mg po TID from [facility] pharmacy. The patient was explained the research data available supporting the benefits of Biotin in decreasing progression. We also discussed repeating the MRI's to compare with 6-19 and establish evidence of MRI activity progression and based on that consider starting a DMT. We spoke about the data on Aubagio for active SPMS and some of the side effects, monitoring needs and efficacy data from phase 3 trials and smaller studies. We also discussed siponimod, and Ocrelizumab in some detail and why I do not think she is a good candidate for them.    The patient was wondering if MS places her at a higher risk for COVID 19 to which she was explained that  it is not the case but her age certainly does.    Plan is to start Biotin 100 mg po TID    RTC in 3 months with MRI B, C and T spine w/wo contrast 3T (she needs oral sedation for claustrophobia).    CBC, CMP before MRI.    The patient will be mailed information on Aubagio for her review and we will discuss in 3 months provided we are able to meet face to face    Phone call duration: 60 minutes    Diane Quiroz MD

## 2020-04-09 ENCOUNTER — TELEPHONE (OUTPATIENT)
Dept: NEUROLOGY | Facility: CLINIC | Age: 65
End: 2020-04-09

## 2020-04-09 NOTE — TELEPHONE ENCOUNTER
I received the following request from Dr. Quiroz:    Please send Rx. for Biotin 100 mg po TID.    I have called the above prescription in to [facility] (phone [phone]) per Dr. Quiroz; The pharmacy will reach out to the patient.    Alana Choi, MS RN Care Coordinator

## 2020-05-01 ENCOUNTER — MYC MEDICAL ADVICE (OUTPATIENT)
Dept: OPHTHALMOLOGY | Facility: CLINIC | Age: 65
End: 2020-05-01

## 2020-05-07 ENCOUNTER — DOCUMENTATION ONLY (OUTPATIENT)
Dept: CARE COORDINATION | Facility: CLINIC | Age: 65
End: 2020-05-07

## 2020-07-08 ENCOUNTER — ANCILLARY PROCEDURE (OUTPATIENT)
Dept: MRI IMAGING | Facility: CLINIC | Age: 65
End: 2020-07-08
Attending: PSYCHIATRY & NEUROLOGY
Payer: MEDICARE

## 2020-07-08 DIAGNOSIS — G35 MULTIPLE SCLEROSIS (H): ICD-10-CM

## 2020-07-08 DIAGNOSIS — E83.52 HYPERCALCEMIA: ICD-10-CM

## 2020-07-08 LAB
ALBUMIN SERPL-MCNC: 3.8 G/DL (ref 3.4–5)
ALP SERPL-CCNC: 73 U/L (ref 40–150)
ALT SERPL W P-5'-P-CCNC: 30 U/L (ref 0–50)
ANION GAP SERPL CALCULATED.3IONS-SCNC: 5 MMOL/L (ref 3–14)
AST SERPL W P-5'-P-CCNC: 20 U/L (ref 0–45)
BASOPHILS # BLD AUTO: 0.1 10E9/L (ref 0–0.2)
BASOPHILS NFR BLD AUTO: 0.8 %
BILIRUB DIRECT SERPL-MCNC: 0.1 MG/DL (ref 0–0.2)
BILIRUB SERPL-MCNC: 0.4 MG/DL (ref 0.2–1.3)
BUN SERPL-MCNC: 15 MG/DL (ref 7–30)
CALCIUM SERPL-MCNC: 9.2 MG/DL (ref 8.5–10.1)
CHLORIDE SERPL-SCNC: 107 MMOL/L (ref 94–109)
CO2 SERPL-SCNC: 28 MMOL/L (ref 20–32)
CREAT SERPL-MCNC: 0.91 MG/DL (ref 0.52–1.04)
DIFFERENTIAL METHOD BLD: NORMAL
EOSINOPHIL # BLD AUTO: 0.1 10E9/L (ref 0–0.7)
EOSINOPHIL NFR BLD AUTO: 0.9 %
ERYTHROCYTE [DISTWIDTH] IN BLOOD BY AUTOMATED COUNT: 14 % (ref 10–15)
GFR SERPL CREATININE-BSD FRML MDRD: 66 ML/MIN/{1.73_M2}
GLUCOSE SERPL-MCNC: 111 MG/DL (ref 70–99)
HCT VFR BLD AUTO: 42.2 % (ref 35–47)
HGB BLD-MCNC: 13.4 G/DL (ref 11.7–15.7)
IMM GRANULOCYTES # BLD: 0 10E9/L (ref 0–0.4)
IMM GRANULOCYTES NFR BLD: 0.3 %
LYMPHOCYTES # BLD AUTO: 2 10E9/L (ref 0.8–5.3)
LYMPHOCYTES NFR BLD AUTO: 30.4 %
MCH RBC QN AUTO: 30.9 PG (ref 26.5–33)
MCHC RBC AUTO-ENTMCNC: 31.8 G/DL (ref 31.5–36.5)
MCV RBC AUTO: 97 FL (ref 78–100)
MONOCYTES # BLD AUTO: 0.4 10E9/L (ref 0–1.3)
MONOCYTES NFR BLD AUTO: 5.7 %
NEUTROPHILS # BLD AUTO: 4 10E9/L (ref 1.6–8.3)
NEUTROPHILS NFR BLD AUTO: 61.9 %
NRBC # BLD AUTO: 0 10*3/UL
NRBC BLD AUTO-RTO: 0 /100
PLATELET # BLD AUTO: 283 10E9/L (ref 150–450)
POTASSIUM SERPL-SCNC: 4.1 MMOL/L (ref 3.4–5.3)
PROT SERPL-MCNC: 7.7 G/DL (ref 6.8–8.8)
PTH-INTACT SERPL-MCNC: 35 PG/ML (ref 18–80)
RBC # BLD AUTO: 4.34 10E12/L (ref 3.8–5.2)
SODIUM SERPL-SCNC: 140 MMOL/L (ref 133–144)
WBC # BLD AUTO: 6.5 10E9/L (ref 4–11)

## 2020-07-08 PROCEDURE — 83970 ASSAY OF PARATHORMONE: CPT | Performed by: PSYCHIATRY & NEUROLOGY

## 2020-07-08 RX ORDER — GADOBUTROL 604.72 MG/ML
7.5 INJECTION INTRAVENOUS ONCE
Status: COMPLETED | OUTPATIENT
Start: 2020-07-08 | End: 2020-07-08

## 2020-07-08 RX ADMIN — GADOBUTROL 7.5 ML: 604.72 INJECTION INTRAVENOUS at 14:23

## 2020-07-09 ENCOUNTER — TELEPHONE (OUTPATIENT)
Dept: NEUROLOGY | Facility: CLINIC | Age: 65
End: 2020-07-09

## 2020-07-09 ENCOUNTER — VIRTUAL VISIT (OUTPATIENT)
Dept: NEUROLOGY | Facility: CLINIC | Age: 65
End: 2020-07-09
Attending: PSYCHIATRY & NEUROLOGY
Payer: COMMERCIAL

## 2020-07-09 DIAGNOSIS — G35 MULTIPLE SCLEROSIS (H): Primary | ICD-10-CM

## 2020-07-09 NOTE — TELEPHONE ENCOUNTER
Patient had a virtual visit with Dr. Quiroz today, and the decision was made for the patient to start on Aubagio 7mg tablets; Start form and patient information packet placed in the mail to the patient's home along with a pre-addressed stamp for the patient to send the form back to us.    Alana Choi MS RN Care Coordinator

## 2020-07-09 NOTE — PROGRESS NOTES
"Poornima Hilton is a 65 year old female who is being evaluated via a billable video visit.      The patient has been notified of following:     \"This video visit will be conducted via a call between you and your physician/provider. We have found that certain health care needs can be provided without the need for an in-person physical exam.  This service lets us provide the care you need with a video conversation.  If a prescription is necessary we can send it directly to your pharmacy.  If lab work is needed we can place an order for that and you can then stop by our lab to have the test done at a later time.    Video visits are billed at different rates depending on your insurance coverage.  Please reach out to your insurance provider with any questions.    If during the course of the call the physician/provider feels a video visit is not appropriate, you will not be charged for this service.\"    Patient has given verbal consent for Video visit? Yes  How would you like to obtain your AVS? Nakul  Patient would like the video invitation sent by: Other e-mail: Nakul  Will anyone else be joining your video visit? No        Video-Visit Details    Type of service:  Video Visit    Video Start Time: 9:05  Video End Time: 9:45 AM    Originating Location (pt. Location): Home    Distant Location (provider location):  [employer] MULTIPLE SCLEROSIS     Platform used for Video Visit: [employer]  THE [facility] MULTIPLE SCLEROSIS CLINIC  FOLLOW UP VISIT           PRINCIPAL NEUROLOGIC DIAGNOSIS: Multiple Sclerosis        HISTORY OF ILLNESS:    This is a follow visit for this 65 year old right handed genetic female  With a history of MS. Who was last seen on  4-20.   At that time the patient was recommended to repeat MRI of the brain cervical and thoracic spine and review information on Aubagio. Since last visit he reports experiencing episodes of dizziness off and on for the past 3 days, these episodes last between 5 " and 10 minutes and not not worsening in intensity or duration.  She thinks they might be associated with stress but I pointed out that in the past 3 days we have had 100 degree temperatures outside and even if she stays inside her core temperature may be higher than usual, this can bring on intermittent symptoms due to decrease in conduction velocity from increase temperature whether is external/internal.    She underwent her MRIs yesterday which will be discussed in a further section and was able to review information on Aubagio    Current Symptoms:  1. Episodic dizziness          Current Outpatient Prescriptions:  Current Outpatient Medications   Medication     aspirin 81 MG EC tablet     brinzolamide-brimonidine (SIMBRINZA) 1-0.2 % ophthalmic suspension     buPROPion (WELLBUTRIN SR) 100 MG 12 hr tablet     Calcium 600 MG tablet     Glucosamine-Chondroit-Vit C-Mn (GLUCOSAMINE CHONDR 500 COMPLEX) CAPS     latanoprost (XALATAN) 0.005 % ophthalmic solution     Multiple Vitamin (MULTIVITAMINS PO)     Omega-3 Fatty Acids (FISH OIL) 1200 MG capsule     Urea 40 % CREA     Vitamin D, Cholecalciferol, 1000 units CAPS     No current facility-administered medications for this visit.           ALLERGIES     No Known Allergies        REVIEW OF SYSTEMS:    Comprehensive review of systems otherwise was negative, including constitutional, head and neck, cardiovascular, pulmonary, gastrointestinal, endocrine, urologic, reproductive, rheumatic, hematologic, immunologic, dermatologic, and psychiatric.    Nutritional concerns: None  Driving issues: None   Safety concerns regarding living situations and safety at home: None  Risk of falls: None  Pain: None      I personally reviewed the following images:    MRI of the brain with and without contrast from July 8, 2020 was compared with the previous one from June 2019, images reveal no interval change except for one new lesion near the posterior horn of the right ventricle, in the  occipital lobe.  This lesion is not enhancing.    MRI of the cervical spine shows one lesion at the level of C5-C6 which is unchanged compared to previous one an MRI of the thoracic spine shows a lesion at the level of T9 obvious on sagittal but not as clear on axial images, the spinal cord lesions are not enhancing.    ASSESSMENT:    Relapsing remitting multiple sclerosis possibly transitioning into the secondary progressive phase but currently active based on MRI activity in the past year    Dizziness episodes likely related to heat.    PLAN:    Today we discussed starting Aubagio to decrease formation of new lesions, possible exacerbations and disease progression.  Efficacy side effects and monitoring needs were discussed in detail and the patient will be started on 7 mg a day due to her age.    She is already on high-dose biotin and has been recommended to take folic acid 1 mg a day to decrease the chances of hair thinning during months 3-7.    We will send her the Aubagio start form for her to sign and once I sign it it will be submitted for insurance approval.    Finally I will follow the patient up in 4 month(s) as long as the patient is doing well. I instructed the patient to call or mychart my office with any concerns or questions.    I spent 40 minutes in this visit, with >50% direct patient time spent counseling about prognosis, treatment options, and coordination of care.     My recommendations will be communicated back to the patient's physician(s) by mail.  Follow-up is expected to be with me.      Diane Quiroz MD  Chief, Multiple Sclerosis Division  Department of Neurology  [facility] [place]

## 2020-07-09 NOTE — LETTER
7/9/2020       RE: Poornima Hilton  [address] [place] [address]     Dear Colleague,    Thank you for referring your patient, Poornima Hilton, to the [facility] MULTIPLE SCLEROSIS at [facility]. Please see a copy of my visit note below.    Poornima Hilton is a 65 year old female who is being evaluated via a billable video visit.            Video-Visit Details    Type of service:  Video Visit    Video Start Time: 9:05  Video End Time: 9:45 AM    Originating Location (pt. Location): Home    Distant Location (provider location):  [facility] MULTIPLE SCLEROSIS     Platform used for Video Visit: [employer]  THE [facility] MULTIPLE SCLEROSIS CLINIC  FOLLOW UP VISIT           PRINCIPAL NEUROLOGIC DIAGNOSIS: Multiple Sclerosis        HISTORY OF ILLNESS:    This is a follow visit for this 65 year old right handed genetic female  With a history of MS. Who was last seen on  4-20.   At that time the patient was recommended to repeat MRI of the brain cervical and thoracic spine and review information on Aubagio. Since last visit he reports experiencing episodes of dizziness off and on for the past 3 days, these episodes last between 5 and 10 minutes and not not worsening in intensity or duration.  She thinks they might be associated with stress but I pointed out that in the past 3 days we have had 100 degree temperatures outside and even if she stays inside her core temperature may be higher than usual, this can bring on intermittent symptoms due to decrease in conduction velocity from increase temperature whether is external/internal.    She underwent her MRIs yesterday which will be discussed in a further section and was able to review information on Aubagio    Current Symptoms:  1. Episodic dizziness          Current Outpatient Prescriptions:  Current Outpatient Medications   Medication     aspirin 81 MG EC tablet     brinzolamide-brimonidine (SIMBRINZA) 1-0.2 % ophthalmic  suspension     buPROPion (WELLBUTRIN SR) 100 MG 12 hr tablet     Calcium 600 MG tablet     Glucosamine-Chondroit-Vit C-Mn (GLUCOSAMINE CHONDR 500 COMPLEX) CAPS     latanoprost (XALATAN) 0.005 % ophthalmic solution     Multiple Vitamin (MULTIVITAMINS PO)     Omega-3 Fatty Acids (FISH OIL) 1200 MG capsule     Urea 40 % CREA     Vitamin D, Cholecalciferol, 1000 units CAPS     No current facility-administered medications for this visit.           ALLERGIES     No Known Allergies        REVIEW OF SYSTEMS:    Comprehensive review of systems otherwise was negative, including constitutional, head and neck, cardiovascular, pulmonary, gastrointestinal, endocrine, urologic, reproductive, rheumatic, hematologic, immunologic, dermatologic, and psychiatric.    Nutritional concerns: None  Driving issues: None   Safety concerns regarding living situations and safety at home: None  Risk of falls: None  Pain: None      I personally reviewed the following images:    MRI of the brain with and without contrast from July 8, 2020 was compared with the previous one from June 2019, images reveal no interval change except for one new lesion near the posterior horn of the right ventricle, in the occipital lobe.  This lesion is not enhancing.    MRI of the cervical spine shows one lesion at the level of C5-C6 which is unchanged compared to previous one an MRI of the thoracic spine shows a lesion at the level of T9 obvious on sagittal but not as clear on axial images, the spinal cord lesions are not enhancing.    ASSESSMENT:    Relapsing remitting multiple sclerosis possibly transitioning into the secondary progressive phase but currently active based on MRI activity in the past year    Dizziness episodes likely related to heat.    PLAN:    Today we discussed starting Aubagio to decrease formation of new lesions, possible exacerbations and disease progression.  Efficacy side effects and monitoring needs were discussed in detail and the patient  will be started on 7 mg a day due to her age.    She is already on high-dose biotin and has been recommended to take folic acid 1 mg a day to decrease the chances of hair thinning during months 3-7.    We will send her the Aubagio start form for her to sign and once I sign it it will be submitted for insurance approval.    Finally I will follow the patient up in 4 month(s) as long as the patient is doing well. I instructed the patient to call or mychart my office with any concerns or questions.    I spent 40 minutes in this visit, with >50% direct patient time spent counseling about prognosis, treatment options, and coordination of care.     My recommendations will be communicated back to the patient's physician(s) by mail.  Follow-up is expected to be with me.      Diane Quiroz MD  Chief, Multiple Sclerosis Division  Department of Neurology  [facility] [place]

## 2020-07-09 NOTE — PATIENT INSTRUCTIONS
Impression:    RRMS with new lesion on MRI which represents disease activity.    Dizziness episodes, likely related to heat.    Plan:    Start Aubagio 7 mg/day   Form to be sent to her for signature  Start folic acid 1 mg/day (OTC) to prevent hair thinning  Obtain hepatic panel q month x 6 once on Aubagio x 1 month  RTC in 4 months  Report diarrhea or any other side effects not discussed

## 2020-07-10 ENCOUNTER — ALLIED HEALTH/NURSE VISIT (OUTPATIENT)
Dept: INTERNAL MEDICINE | Facility: CLINIC | Age: 65
End: 2020-07-10
Payer: MEDICARE

## 2020-07-10 DIAGNOSIS — Z23 NEED FOR PNEUMOCOCCAL VACCINE: Primary | ICD-10-CM

## 2020-07-10 NOTE — NURSING NOTE
Patient received Pnuemovax 23 vaccine. Vaccine was given under the supervision of Dr. Sorto. See immunization list for administration details. Pt was advised to remain in [facility] lobby for 15 minutes in case of an averse reaction. Given by Tracey Valdes CMA, EMT 10:25 AM 7/10/2020

## 2020-07-23 ENCOUNTER — TELEPHONE (OUTPATIENT)
Dept: NEUROLOGY | Facility: CLINIC | Age: 65
End: 2020-07-23

## 2020-07-23 DIAGNOSIS — G35 MULTIPLE SCLEROSIS (H): Primary | ICD-10-CM

## 2020-07-23 NOTE — TELEPHONE ENCOUNTER
Prior Authorization Specialty Medication Request    Medication/Dose: Aubagio 7mg  ICD code (if different than what is on RX):  Relapsing Remitting Multiple Sclerosis, G35  Previously Tried and Failed:  None    Important Lab Values: n/a  Rationale: Initiation of disease modifying therapy for demyelinating disease, please approve.    Insurance Name: [facility] [employer]  Insurance ID: [id number]  Insurance Phone Number: [phone]    Pharmacy Information (if different than what is on RX)  Name:  n/a  Phone:  n/a

## 2020-07-23 NOTE — TELEPHONE ENCOUNTER
Prior Authorization Approval    Authorization Effective Date: 6/23/2020  Authorization Expiration Date: 7/23/2021  Medication: Aubagio 7MG Tablets (APPROVED)  Approved Dose/Quantity: 30  Reference #:     Insurance Company: [employer] - Phone [phone] Fax [phone]  Expected CoPay: $2304.49     CoPay Card Available:      Foundation Assistance Needed:    Which Pharmacy is filling the prescription (Not needed for infusion/clinic administered[facility] [place] [place] [facility] [address] [place] [address]  Pharmacy Notified: No  Patient Notified: Yes

## 2020-07-23 NOTE — TELEPHONE ENCOUNTER
Aubagio form received back from the patient; This has been placed in Dr. Quiroz's folder for signature; I have sent a PA request.    Alana Choi, MS RN Care Coordinator

## 2020-07-30 NOTE — TELEPHONE ENCOUNTER
Aubagio start form signed by Dr. Quiroz; This has been faxed to the PA team.    Alana Choi, MS RN Care Coordinator

## 2020-08-04 RX ORDER — TERIFLUNOMIDE 7 MG/1
7 TABLET, FILM COATED ORAL DAILY
Qty: 30 TABLET | Refills: 11 | Status: SHIPPED | OUTPATIENT
Start: 2020-08-04 | End: 2020-12-17

## 2020-08-04 NOTE — TELEPHONE ENCOUNTER
Aubagio 7mg prescription sent to [facility] pharmacy per patient request per MS refill protocol.    Alana Choi MS RN Care Coordinator

## 2020-08-04 NOTE — TELEPHONE ENCOUNTER
Received VM from patient asking that we send a new RX for Aubagio [facility] complete process.    Thank you,    Mey Quintero [facility]  [facility] [address] [place] [address]  Ph: [phone] Fax: [phone]  [email or web address]

## 2020-09-08 DIAGNOSIS — G35 MULTIPLE SCLEROSIS (H): ICD-10-CM

## 2020-09-08 LAB
ALBUMIN SERPL-MCNC: 3.8 G/DL (ref 3.4–5)
ALP SERPL-CCNC: 114 U/L (ref 40–150)
ALT SERPL W P-5'-P-CCNC: 46 U/L (ref 0–50)
AST SERPL W P-5'-P-CCNC: 26 U/L (ref 0–45)
BILIRUB DIRECT SERPL-MCNC: 0.1 MG/DL (ref 0–0.2)
BILIRUB SERPL-MCNC: 0.4 MG/DL (ref 0.2–1.3)
PROT SERPL-MCNC: 7.4 G/DL (ref 6.8–8.8)

## 2020-10-07 DIAGNOSIS — G35 MULTIPLE SCLEROSIS (H): ICD-10-CM

## 2020-10-07 LAB
ALBUMIN SERPL-MCNC: 3.5 G/DL (ref 3.4–5)
ALP SERPL-CCNC: 193 U/L (ref 40–150)
ALT SERPL W P-5'-P-CCNC: 130 U/L (ref 0–50)
AST SERPL W P-5'-P-CCNC: 49 U/L (ref 0–45)
BILIRUB DIRECT SERPL-MCNC: 0.2 MG/DL (ref 0–0.2)
BILIRUB SERPL-MCNC: 0.4 MG/DL (ref 0.2–1.3)
PROT SERPL-MCNC: 7.3 G/DL (ref 6.8–8.8)

## 2020-10-07 PROCEDURE — 80076 HEPATIC FUNCTION PANEL: CPT | Performed by: PATHOLOGY

## 2020-10-19 DIAGNOSIS — F32.A DEPRESSION, UNSPECIFIED DEPRESSION TYPE: ICD-10-CM

## 2020-10-22 RX ORDER — BUPROPION HYDROCHLORIDE 100 MG/1
100 TABLET, EXTENDED RELEASE ORAL 2 TIMES DAILY
Qty: 180 TABLET | Refills: 0 | Status: SHIPPED | OUTPATIENT
Start: 2020-10-22 | End: 2020-12-28

## 2020-10-22 NOTE — TELEPHONE ENCOUNTER
Last Clinic Visit: 10/15/2019  [facility]  Overdue PHQ9,-- FYI to clinic  Scheduling has been notified to contact the pt for appointment.  90 day RF

## 2020-11-02 ENCOUNTER — MYC MEDICAL ADVICE (OUTPATIENT)
Dept: NEUROLOGY | Facility: CLINIC | Age: 65
End: 2020-11-02

## 2020-11-05 DIAGNOSIS — H40.1231 NORMAL TENSION GLAUCOMA OF BOTH EYES, MILD STAGE: ICD-10-CM

## 2020-11-05 DIAGNOSIS — H40.1132 PRIMARY OPEN ANGLE GLAUCOMA OF BOTH EYES, MODERATE STAGE: ICD-10-CM

## 2020-11-05 RX ORDER — LATANOPROST 50 UG/ML
1 SOLUTION/ DROPS OPHTHALMIC AT BEDTIME
Qty: 7.5 ML | Refills: 1 | Status: SHIPPED | OUTPATIENT
Start: 2020-11-05

## 2020-11-05 NOTE — TELEPHONE ENCOUNTER
latanoprost (XALATAN) 0.005 % ophthalmic solution   Last Written Prescription Date:  8/6/19  Last Fill Quantity: 7.5ml,   # refills: 11  Last Office Visit : 1/31/20  Future Office visit:  None     Latanoprost which is a teal top drop at bedtime in both eyes        brinzolamide-brimonidine (SIMBRINZA) 1-0.2 % ophthalmic suspension    Last Written Prescription Date: 1/29/20  Last Fill Quantity: 8ml,   # refills: 3    Simbrinza (Brinzolamide/Brimonidine) which is a white top drop 2x/day (12 hours apart) in the LEFT EYE ONLY      Patient will return to clinic in 6-8 months

## 2020-11-06 DIAGNOSIS — G35 MULTIPLE SCLEROSIS (H): ICD-10-CM

## 2020-11-06 LAB
ALBUMIN SERPL-MCNC: 3.6 G/DL (ref 3.4–5)
ALP SERPL-CCNC: 124 U/L (ref 40–150)
ALT SERPL W P-5'-P-CCNC: 55 U/L (ref 0–50)
AST SERPL W P-5'-P-CCNC: 41 U/L (ref 0–45)
BILIRUB DIRECT SERPL-MCNC: 0.1 MG/DL (ref 0–0.2)
BILIRUB SERPL-MCNC: 0.3 MG/DL (ref 0.2–1.3)
PROT SERPL-MCNC: 7.1 G/DL (ref 6.8–8.8)

## 2020-11-06 PROCEDURE — 36415 COLL VENOUS BLD VENIPUNCTURE: CPT | Performed by: PATHOLOGY

## 2020-11-06 PROCEDURE — 80076 HEPATIC FUNCTION PANEL: CPT | Performed by: PATHOLOGY

## 2020-12-03 ENCOUNTER — MYC MEDICAL ADVICE (OUTPATIENT)
Dept: NEUROLOGY | Facility: CLINIC | Age: 65
End: 2020-12-03

## 2020-12-09 DIAGNOSIS — G35 MULTIPLE SCLEROSIS (H): ICD-10-CM

## 2020-12-09 LAB
ALBUMIN SERPL-MCNC: 3.7 G/DL (ref 3.4–5)
ALP SERPL-CCNC: 96 U/L (ref 40–150)
ALT SERPL W P-5'-P-CCNC: 38 U/L (ref 0–50)
AST SERPL W P-5'-P-CCNC: 22 U/L (ref 0–45)
BILIRUB DIRECT SERPL-MCNC: 0.1 MG/DL (ref 0–0.2)
BILIRUB SERPL-MCNC: 0.3 MG/DL (ref 0.2–1.3)
PROT SERPL-MCNC: 7.1 G/DL (ref 6.8–8.8)

## 2020-12-09 PROCEDURE — 36415 COLL VENOUS BLD VENIPUNCTURE: CPT | Performed by: PATHOLOGY

## 2020-12-09 PROCEDURE — 80076 HEPATIC FUNCTION PANEL: CPT | Performed by: PATHOLOGY

## 2020-12-14 ENCOUNTER — HEALTH MAINTENANCE LETTER (OUTPATIENT)
Age: 65
End: 2020-12-14

## 2020-12-28 ENCOUNTER — VIRTUAL VISIT (OUTPATIENT)
Dept: INTERNAL MEDICINE | Facility: CLINIC | Age: 65
End: 2020-12-28
Payer: MEDICARE

## 2020-12-28 DIAGNOSIS — F32.A DEPRESSION, UNSPECIFIED DEPRESSION TYPE: ICD-10-CM

## 2020-12-28 DIAGNOSIS — E78.5 HYPERLIPIDEMIA, UNSPECIFIED HYPERLIPIDEMIA TYPE: Primary | ICD-10-CM

## 2020-12-28 PROCEDURE — 99213 OFFICE O/P EST LOW 20 MIN: CPT | Mod: 95 | Performed by: INTERNAL MEDICINE

## 2020-12-28 RX ORDER — BUPROPION HYDROCHLORIDE 100 MG/1
100 TABLET, EXTENDED RELEASE ORAL 2 TIMES DAILY
Qty: 180 TABLET | Refills: 3 | Status: SHIPPED | OUTPATIENT
Start: 2020-12-28

## 2020-12-28 ASSESSMENT — PATIENT HEALTH QUESTIONNAIRE - PHQ9: SUM OF ALL RESPONSES TO PHQ QUESTIONS 1-9: 2

## 2020-12-28 NOTE — NURSING NOTE
Chief Complaint   Patient presents with     Recheck Medication     pt would like to discuss refill on buproprion       Leonel Loja CMA, EMT at 9:46 AM on 12/28/2020.

## 2020-12-28 NOTE — PROGRESS NOTES
HPI  65-year-old presents today for phone visit.  She is planning to move to [place] to be closer to her daughter and granddaughter.  She is in the process of making the move now.  She reports that depression is well controlled on the bupropion and she wishes to continue this and we refilled this.  She is having some pain beneath her great toe and heel with some thinning of the fat pad here.  She has managed to get by using comfortable shoes and padded insoles and we encouraged this.  This point its not interfering with what her desire for physical activity and exercise.  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/2012     Multiple sclerosis (H) 2019    secondary progressive     Osteoarthritis of carpometacarpal joint of thumb     bilaterally     Primary open angle glaucoma of both eyes, mild stage 12/01/2016     Past Surgical History:   Procedure Laterality Date     ORIF left ankle  08/20/2017     Family History   Problem Relation Age of Onset     Cerebrovascular Disease Mother      Depression Mother      Eye Disorder Mother         macular degeneration     Psychotic Disorder Mother      Respiratory Mother      Glaucoma Mother      Cancer - colorectal Father      Arthritis Father      Cardiovascular Father      Heart Disease Father      Thyroid Disease Father      Sleep Apnea Father      Eye Disorder Maternal Grandfather      Glaucoma Maternal Grandfather      Macular Degeneration Maternal Grandfather      Cerebrovascular Disease Paternal Grandmother      Arthritis Paternal Grandmother      Unknown/Adopted Paternal Grandfather      Multiple Sclerosis No family hx of      Autoimmune Disease No family hx of      Social History     Socioeconomic History     Marital status:      Spouse name: Not on file     Number of  children: Not on file     Years of education: Not on file     Highest education level: Not on file   Occupational History     Not on file   Social Needs     Financial resource strain: Not on file     Food insecurity     Worry: Not on file     Inability: Not on file     Transportation needs     Medical: Not on file     Non-medical: Not on file   Tobacco Use     Smoking status: Never Smoker     Smokeless tobacco: Never Used   Substance and Sexual Activity     Alcohol use: Yes     Alcohol/week: 5.0 standard drinks     Types: 6 Standard drinks or equivalent per week     Comment: 3 per week     Drug use: No     Sexual activity: Yes     Partners: Male   Lifestyle     Physical activity     Days per week: Not on file     Minutes per session: Not on file     Stress: Not on file   Relationships     Social connections     Talks on phone: Not on file     Gets together: Not on file     Attends Anabaptism service: Not on file     Active member of club or organization: Not on file     Attends meetings of clubs or organizations: Not on file     Relationship status: Not on file     Intimate partner violence     Fear of current or ex partner: Not on file     Emotionally abused: Not on file     Physically abused: Not on file     Forced sexual activity: Not on file   Other Topics Concern     Parent/sibling w/ CABG, MI or angioplasty before 65F 55M? Not Asked      Service Not Asked     Blood Transfusions Not Asked     Caffeine Concern Not Asked     Occupational Exposure Not Asked     Hobby Hazards Not Asked     Sleep Concern Not Asked     Stress Concern Not Asked     Weight Concern Not Asked     Special Diet Not Asked     Back Care Not Asked     Exercise Yes     Comment: run/walk 2-3 x/week     Bike Helmet Not Asked     Seat Belt Not Asked     Self-Exams Not Asked   Social History Narrative     Not on file       Exam:  There were no vitals taken for this visit.    ASSESSMENT  1 MS stable  2 history of depression in remission on  bupropion  3 borderline hyperlipidemia needs follow-up  4  Foot pain related to fat pad atrophy    Plan  I refilled her medications she will continue to use over-the-counter padding for the foot and will plan to establish care in [place].  (13 minutes))  This note was completed using Dragon voice recognition software.      Maxim Marcum MD  General Internal Medicine  [facility]  [phone]

## 2021-01-04 ENCOUNTER — MYC MEDICAL ADVICE (OUTPATIENT)
Dept: NEUROLOGY | Facility: CLINIC | Age: 66
End: 2021-01-04

## 2021-01-07 ENCOUNTER — VIRTUAL VISIT (OUTPATIENT)
Dept: NEUROLOGY | Facility: CLINIC | Age: 66
End: 2021-01-07
Attending: PSYCHIATRY & NEUROLOGY
Payer: MEDICARE

## 2021-01-07 DIAGNOSIS — E78.5 HYPERLIPIDEMIA, UNSPECIFIED HYPERLIPIDEMIA TYPE: ICD-10-CM

## 2021-01-07 DIAGNOSIS — G35 MS (MULTIPLE SCLEROSIS) (H): Primary | ICD-10-CM

## 2021-01-07 DIAGNOSIS — G35 MULTIPLE SCLEROSIS (H): ICD-10-CM

## 2021-01-07 LAB
ALBUMIN SERPL-MCNC: 3.7 G/DL (ref 3.4–5)
ALP SERPL-CCNC: 103 U/L (ref 40–150)
ALT SERPL W P-5'-P-CCNC: 36 U/L (ref 0–50)
AST SERPL W P-5'-P-CCNC: 21 U/L (ref 0–45)
BILIRUB DIRECT SERPL-MCNC: 0.1 MG/DL (ref 0–0.2)
BILIRUB SERPL-MCNC: 0.4 MG/DL (ref 0.2–1.3)
CHOLEST SERPL-MCNC: 204 MG/DL
HDLC SERPL-MCNC: 96 MG/DL
LDLC SERPL CALC-MCNC: 98 MG/DL
NONHDLC SERPL-MCNC: 108 MG/DL
PROT SERPL-MCNC: 7.3 G/DL (ref 6.8–8.8)
TRIGL SERPL-MCNC: 46 MG/DL

## 2021-01-07 PROCEDURE — 99214 OFFICE O/P EST MOD 30 MIN: CPT | Mod: 95 | Performed by: PSYCHIATRY & NEUROLOGY

## 2021-01-07 PROCEDURE — 80076 HEPATIC FUNCTION PANEL: CPT | Performed by: PATHOLOGY

## 2021-01-07 PROCEDURE — 36415 COLL VENOUS BLD VENIPUNCTURE: CPT | Performed by: PATHOLOGY

## 2021-01-07 PROCEDURE — 80061 LIPID PANEL: CPT | Performed by: PATHOLOGY

## 2021-01-07 NOTE — PROGRESS NOTES
"Poornima is a 65 year old who is being evaluated via a billable video visit.      How would you like to obtain your AVS? Dayo  If the video visit is dropped, the invitation should be resent by: Other e-mail: dayo  Will anyone else be joining your video visit? No      Video Start Time: 1:04 PM  Video-Visit Details    Type of service:  Video Visit    Video End Time:1:33 PM    Originating Location (pt. Location): Home    Distant Location (provider location):  [facility] [place]     Platform used for Video Visit: [employer]    Progress Notes       Poornima Hilton is a 65 year old female who is being evaluated via a billable video visit.       The patient has been notified of following:      \"This video visit will be conducted via a call between you and your physician/provider. We have found that certain health care needs can be provided without the need for an in-person physical exam.  This service lets us provide the care you need with a video conversation.  If a prescription is necessary we can send it directly to your pharmacy.  If lab work is needed we can place an order for that and you can then stop by our lab to have the test done at a later time.     Video visits are billed at different rates depending on your insurance coverage.  Please reach out to your insurance provider with any questions.     If during the course of the call the physician/provider feels a video visit is not appropriate, you will not be charged for this service.\"     Patient has given verbal consent for Video visit? Yes  How would you like to obtain your AVS? Dayo  Patient would like the video invitation sent by: Other e-mail: Dayo  Will anyone else be joining your video visit? No        Video-Visit Details     THE [facility]IC  FOLLOW UP VISIT               PRINCIPAL NEUROLOGIC DIAGNOSIS: Multiple Sclerosis           HISTORY OF ILLNESS:     This is a follow " visit for this 65 year old right handed genetic female  With a history of MS. Who was last seen on 7-20.     At that time the patient was recommended to start 7 mg daily Aubagio. Since last visit she reports that the episodes of dizziness resolved so it was likely related to heat/weather.    She started Aubagio and had minimal GI symptoms and HA for 2-3 weeks and no hair thinning, she is on month 5 and still taking Biotin and folic acid.    No other issues or concerns except RLE weakness worse after exertion which is not new.    Hepatic panel has been normal for the past 5 months except one month, with mild increase in ALT/AST which resolved.    She is moving to [place] so we spoke about some of the specialist in the area.        Current Outpatient Prescriptions:      Current Outpatient Medications   Medication     aspirin 81 MG EC tablet     brinzolamide-brimonidine (SIMBRINZA) 1-0.2 % ophthalmic suspension     buPROPion (WELLBUTRIN SR) 100 MG 12 hr tablet     Calcium 600 MG tablet     Glucosamine-Chondroit-Vit C-Mn (GLUCOSAMINE CHONDR 500 COMPLEX) CAPS     latanoprost (XALATAN) 0.005 % ophthalmic solution     Multiple Vitamin (MULTIVITAMINS PO)     Omega-3 Fatty Acids (FISH OIL) 1200 MG capsule     Urea 40 % CREA     Vitamin D, Cholecalciferol, 1000 units CAPS      No current facility-administered medications for this visit.             ALLERGIES      No Known Allergies           REVIEW OF SYSTEMS:     Comprehensive review of systems otherwise was negative, including constitutional, head and neck, cardiovascular, pulmonary, gastrointestinal, endocrine, urologic, reproductive, rheumatic, hematologic, immunologic, dermatologic, and psychiatric.     Nutritional concerns: None  Driving issues: None   Safety concerns regarding living situations and safety at home: None  Risk of falls: None  Pain: None        ASSESSMENT:     Relapsing remitting multiple sclerosis possibly transitioning into the secondary progressive  phase, tolerating Aubagio 7 mg daily. Patient is moving to [place] and was given recommendations on MS experts in the area.     Dizziness episodes resolved.    RLE weakness an issue    Plan: continue Aubagio 7 mg and increase to 14 mg if any evidence of MRI or clinical activity is seen.   Re-start PT once in [place] and continue high dose Biotin.  Start Alpha Lipoic acid  mg/day    No need for F/U in [place].    I spent 30 min on this visit including reviewing previous records, documenting the visit and coordinating care.       Diane Quiroz MD  Chief, Multiple Sclerosis Division  Department of Neurology  [facility] [place]

## 2021-01-07 NOTE — LETTER
"1/7/2021       RE: Poornima Hilton  [address] [place] [address]     Dear Colleague,    Thank you for referring your patient, Poornima Hilton, to the [facility]. Please see a copy of my visit note below.    Poornima is a 65 year old who is being evaluated via a billable video visit.      How would you like to obtain your AVS? Nakul  If the video visit is dropped, the invitation should be resent by: Other e-mail: nakul  Will anyone else be joining your video visit? No      Video Start Time: 1:04 PM  Video-Visit Details    Type of service:  Video Visit    Video End Time:1:33 PM    Originating Location (pt. Location): Home    Distant Location (provider location):  [facility]     Platform used for Video Visit: [employer]    Progress Notes       Poornima Hilton is a 65 year old female who is being evaluated via a billable video visit.       The patient has been notified of following:      \"This video visit will be conducted via a call between you and your physician/provider. We have found that certain health care needs can be provided without the need for an in-person physical exam.  This service lets us provide the care you need with a video conversation.  If a prescription is necessary we can send it directly to your pharmacy.  If lab work is needed we can place an order for that and you can then stop by our lab to have the test done at a later time.     Video visits are billed at different rates depending on your insurance coverage.  Please reach out to your insurance provider with any questions.     If during the course of the call the physician/provider feels a video visit is not appropriate, you will not be charged for this service.\"     Patient has given verbal consent for Video visit? Yes  How would you like to obtain your AVS? Nakul  Patient would like the video " invitation sent by: Other e-mail: Dayo  Will anyone else be joining your video visit? No        Video-Visit Details     THE [facility] CLINIC  FOLLOW UP VISIT               PRINCIPAL NEUROLOGIC DIAGNOSIS: Multiple Sclerosis           HISTORY OF ILLNESS:     This is a follow visit for this 65 year old right handed genetic female  With a history of MS. Who was last seen on 7-20.     At that time the patient was recommended to start 7 mg daily Aubagio. Since last visit she reports that the episodes of dizziness resolved so it was likely related to heat/weather.    She started Aubagio and had minimal GI symptoms and HA for 2-3 weeks and no hair thinning, she is on month 5 and still taking Biotin and folic acid.    No other issues or concerns except RLE weakness worse after exertion which is not new.    Hepatic panel has been normal for the past 5 months except one month, with mild increase in ALT/AST which resolved.    She is moving to [place] so we spoke about some of the specialist in the area.        Current Outpatient Prescriptions:      Current Outpatient Medications   Medication     aspirin 81 MG EC tablet     brinzolamide-brimonidine (SIMBRINZA) 1-0.2 % ophthalmic suspension     buPROPion (WELLBUTRIN SR) 100 MG 12 hr tablet     Calcium 600 MG tablet     Glucosamine-Chondroit-Vit C-Mn (GLUCOSAMINE CHONDR 500 COMPLEX) CAPS     latanoprost (XALATAN) 0.005 % ophthalmic solution     Multiple Vitamin (MULTIVITAMINS PO)     Omega-3 Fatty Acids (FISH OIL) 1200 MG capsule     Urea 40 % CREA     Vitamin D, Cholecalciferol, 1000 units CAPS      No current facility-administered medications for this visit.             ALLERGIES      No Known Allergies           REVIEW OF SYSTEMS:     Comprehensive review of systems otherwise was negative, including constitutional, head and neck, cardiovascular, pulmonary, gastrointestinal, endocrine, urologic, reproductive, rheumatic, hematologic,  immunologic, dermatologic, and psychiatric.     Nutritional concerns: None  Driving issues: None   Safety concerns regarding living situations and safety at home: None  Risk of falls: None  Pain: None        ASSESSMENT:     Relapsing remitting multiple sclerosis possibly transitioning into the secondary progressive phase, tolerating Aubagio 7 mg daily. Patient is moving to [place] and was given recommendations on MS experts in the area.     Dizziness episodes resolved.    RLE weakness an issue    Plan: continue Aubagio 7 mg and increase to 14 mg if any evidence of MRI or clinical activity is seen.   Re-start PT once in [place] and continue high dose Biotin.  Start Alpha Lipoic acid  mg/day    No need for F/U in [place].    I spent 30 min on this visit including reviewing previous records, documenting the visit and coordinating care.       Diane Quiroz MD  Chief, Multiple Sclerosis Division  Department of Neurology  [facility]              Again, thank you for allowing me to participate in the care of your patient.      Sincerely,    Diane Quiroz MD

## 2021-03-26 ENCOUNTER — TELEPHONE (OUTPATIENT)
Dept: OPHTHALMOLOGY | Facility: CLINIC | Age: 66
End: 2021-03-26

## 2021-03-26 DIAGNOSIS — H40.1132 PRIMARY OPEN ANGLE GLAUCOMA OF BOTH EYES, MODERATE STAGE: ICD-10-CM

## 2021-03-26 NOTE — TELEPHONE ENCOUNTER
brinzolamide-brimonidine (SIMBRINZA) 1-0.2 % ophthalmic suspension    Requested directions: Place 1 drop Into the left eye 2 times daily 12 hours apart.For additional refills, please schedule a follow-up appointment at [phone]. - Left Eye  Current directions on the medication list:  Place 1 drop Into the left eye 2 times daily 12 hours apart.For additional refills, please schedule a follow-up appointment at [phone]. - Left Eye    Last Written Prescription Date:  11/5/2020  Last Fill Quantity: 8,   # refills: 1    Last Office Visit: 1/31/2020  Future Office visit: None    Attending Provider: Dr. Abreu   Last Clinic Note: 1/31/2020    Patient will continue on Latanoprost which is a teal top drop at bedtime in both eyes and Simbrinza (Brinzolamide/Brimonidine) which is a white top drop 2x/day (12 hours apart) in the LEFT EYE ONLY.    Patient will return to clinic in 6-8 months with repeat IOP check and visual field test (LEFT EYE FIRST).       Routing refill request to provider for review/approval because:  Dr. Abreu gone,   Pt needs to establish care with New Provider.      Please advise    Jaimee Fontaine RN  Central Triage Red Flags/Med Refills

## 2021-03-29 ENCOUNTER — TELEPHONE (OUTPATIENT)
Dept: OPHTHALMOLOGY | Facility: CLINIC | Age: 66
End: 2021-03-29

## 2021-03-29 NOTE — TELEPHONE ENCOUNTER
LVM with patient instructing to call scheduling center and schedule an appointment with either Dr. Lanza or Dr. Scott prior to refilling eye drops.  TAY Jones 3/29/2021 4:31 PM

## 2021-10-02 ENCOUNTER — HEALTH MAINTENANCE LETTER (OUTPATIENT)
Age: 66
End: 2021-10-02

## 2022-01-22 ENCOUNTER — HEALTH MAINTENANCE LETTER (OUTPATIENT)
Age: 67
End: 2022-01-22

## 2022-09-03 ENCOUNTER — HEALTH MAINTENANCE LETTER (OUTPATIENT)
Age: 67
End: 2022-09-03

## 2023-04-23 ENCOUNTER — HEALTH MAINTENANCE LETTER (OUTPATIENT)
Age: 68
End: 2023-04-23

## 2024-02-17 ENCOUNTER — HEALTH MAINTENANCE LETTER (OUTPATIENT)
Age: 69
End: 2024-02-17

## 2025-01-02 NOTE — PROGRESS NOTES
MULTIPLE SCLEROSIS CLINIC AT THE [facility]  FOLLOWUP/ESTABLISHED PATIENT VISIT      PRINCIPAL NEUROLOGIC DIAGNOSIS: Deferred    Date of Onset: 2000  Date of Diagnosis: NA  Initial Clinical Course: Relapsing Remitting  Current Clinical Course: Relapsing Remitting  Past Disease Modifying Therapy(ies): NA  Current Disease Modifying Therapy(ies):NA  Most Recent MRI of the Brain: 5/7/19  Most Recent MRI of the Cervical Cord 5/7/19  Most Recent MRI of the Thoracic Cord: 5/13/19  Most Recent Lumbar Puncture: 5/22/19 (RBC 1, WBC 1, Glucose 58, Protein 58,  8 OCB        CHIEF COMPLAINT: Review of diagnostic testing    HPI:The patient has a long a complex history. The patient reportedly was at her baseline level of health until 2000. At that time She developed numbness and tingling sensation in her feet and hands. She reports that her symptoms gradual ascending pattern.With the first episode of paresthesia, her toes and fingers were involved.  Her sensory symptoms stopped at the knee and shoulder area before a complete improvement.  States that it took  4-5 months for her symptoms to develop completely and 4-5 months for her symptoms to resolve. Her sensory symptoms resolved in a descending pattern. States that she had a  lumbar puncture in 2000, which was presumed normal.  States that her records from her first neurologist in [facility] will not be available because of out of practice.  The patient has another episode of in 2008.  She was seen at the [facility] for the above-mentioned symptoms. Reported normal EMG. Her outside records from [facility] were available for review. Apparently, the patient had a normal brain MRI and cervical MRI in 2008.  Her imaging evaluation was for demyelinating disease, and there was no evidence of any demyelinating.  Essentially normal contrast brain and cervical spine MRI.  Her EMG report was on 02/05/2008.  The electrophysiological studies were  Notified of pt by day attending, call for rapid response due to pt with irregular EKG tracings per report from tele room. Pt with ventricular paced rhythm - per review of past records pt with AV paced rate in 80's pt seen at bedside due to concerns for irregular rhythm on tele- per monitor room concern for svt- Pt awake but slow to respond in a soft voice- pt denies any complaints of CP, SOB, palpations at this time.     Neuro: Alert, awake, soft spoken, no noted deficits  CV: HR irregular, strong radial pulses, no noted perf edema  RESP: Lungs with scatter rhonchi, resp easy and non labored  GI: Abd soft, + bowel sounds  : Deferred  INTEG: Skin warm,dry and pale    Will have pacer interogated , nursing working on now, will ck lab now-     Cards consult placed for am-     Pt to be moved to stepdown, defib pads placed due to concern for pacemaker functioning. Charge and NAC aware of plans,    2345: Notified by nursing of pt with DNR status, met with pt and  at bedside, she  (Mrs Rangel) verified that she does not wish to have CPR performed on her nor a breathing tube placed.  She does desire to have continued health care and wants to have her issues addressed.  We discussed both chest compressions and a breathing tube and she and  are in full agreement to NO compressions and NO breathing tube. Code status has been changed in computer.    "normal with no evidence of polyneuropathy or left cervical radiculopathy on this study.  She describes that her symptoms have a symmetrical pattern.    Her second episode of paresthesia lasted for 1-2 months without any further progression and resolved.    Her third episode of numbness and tingling sensation in her toes and fingers has started 3 days ago.  She describes that her symptoms are in a symmetrical pattern.  They have staredt mainly in her 2nd-5th toes and spread to her upper plantar and partially top of her feet and curve to the outside of her feet in a letter \"C\" pattern. She does have a tingling sensation in her hands that mainly starts with her 5th digit outside of her palm area and stops at the wrist.  States that otherwise she is feeling good.  Her balance is good.  She works for the [employer], but denies any known exposure to chemicals or metals.  She does not recall any injuries.  Her neck does not appear to be concerning for her.  Her episode in 2008 lasted 1-2 months and evolved around her arms and did not progress.  She denies a history of falls.       Thje patient presented to Dr. Hancock on 3/26/2014 for paraesthesia.. She jhad a EMG that was read as normal. The patient had a negative SSEP in the upper and lower extremities. The patient was lost to follow up until 4/8/2009. Patient reports that starting last fall (2018) she fell dizzy for 2 weeks and resolved but re-occurred. Patient reports that dizziness initially lasted for 2 weeks then resolved and then 2-3 weeks in October of 2018 and resolved. Patient reports that it re-occurred March 10th 2019 and resolved. Patient reports that she feels \"dizzy\" and \"off balance\" feeling. Reports that laying in the bed -not noticeable in March but was in the fall of 2018. Associated symptoms-none. Reports that in the fall dizziness would worse with tilting to the left but was not currently.    INTERVAL HISTORY:    The patient is " getting mild headaches and mild episodes off dizziness. She reports that these eposodes are mild. She reports that she is still having itemittent leg weakness. She reports that this has been getting worse with exervice. She reports that this is getting worse 2wiht exercie. She reports that she will need to take the breaks. This has been getting slowly since January of last year.     Issues with current MS therapy: Not on DMT    REVIEW OF SYSTEMS:  Answers for HPI/ROS submitted by the patient on 6/4/2019   General Symptoms: Yes  Skin Symptoms: No  HENT Symptoms: No  EYE SYMPTOMS: No  HEART SYMPTOMS: No  LUNG SYMPTOMS: No  INTESTINAL SYMPTOMS: No  URINARY SYMPTOMS: Yes  GYNECOLOGIC SYMPTOMS: No  BREAST SYMPTOMS: No  SKELETAL SYMPTOMS: Yes  BLOOD SYMPTOMS: No  NERVOUS SYSTEM SYMPTOMS: Yes  MENTAL HEALTH SYMPTOMS: No  Fatigue: Yes  Trouble holding urine or incontinence: Yes  Muscle weakness: Yes  Tingling: Yes  Weakness: Yes  Numbness: Yes      Mood: unchanged  Spasticity:unchanged  Bladder: urgency occasional leakage  Bowel: unchanged  Pain related to today's visit:reviewed on nursing intake documentation  Fatigue: worse and can only hike a couple of miles  Sleep: well/ no problem  Memory/Concentration: unchanged      Otherwise 10 point ROS was neg other than the symptoms noted above.    PAST HISTORY was reviewed and updated:      MEDICATIONS and ALLERGIES were reviewed and updated.    SOCIAL HISTORY was reviewed and updated:      EXAM:    PHYSICAL EXAMINATION:   VITAL SIGNS:  B/P: 136/82, T: Data Unavailable, P: 75, R: Data Unavailable    GENERAL: The patient is a well-nourished  who presents to the evaluation alone.  NEUROLOGIC:   MENTAL STATUS: Alert,awake and  oriented times four.   CRANIAL NERVES: . Visual fields are full to confrontation. The pupils are  round and react to light and there is no Tano Ta pupil. Extraocular movements are  intact with no  internuclear ophthalmoplegia. No nystagmus. Facial  strength and sensation are  normal. Hearing is  normal. Palate elevation and tongue protrusion are  normal.   POWER:     Motor    Upper      Right Left   Shoulder Abduction 5 5   Elbow Flexion 5 5   Elbow Extension 5 5   Wrist Extension 5 5   Digit Extension 5 5   Digit Flexion 5 5   APB 5 5   Tone 0 0   Lower       Right Left   Hip Flexion 4 5   Knee Extension 5 5   Knee Flexion 5 5   Foot Dorsiflexion 5 5   Foot Plantar Flexion 5 5   EH 5 5   Toe Flexion 5 5   Tone 0 0           Grade Description   0 No increase in muscle tone   1 Slight increase in muscle tone, manifested by a catch and release or by minimal resistance at the end of the range of motion when the affected part(s) is moved in flexion or extension   1+ Slight increase in muscle tone, manifested by a catch, followed by minimal resistance throughout the remainder (less than half) of the ROM   2 More marked increase in muscle tone through most of the ROM, but affected part(s) easily moved   3 Considerable increase in muscle tone, passive movement difficult   4 Affected part(s) rigid in flexion or extension           SENSORY:     Light touch:  moderately diminished in bilateral lower extremities , Pin Prick:  moderately diminished in bilateral lower extremities , Vibration:   Intact in all extremities, Prioperception:  Intact in all extremities and Temperature:  Intact in all extremities    REFLEXES:     Reflexes       Right  Left   Biceps 2  2   Triceps 2  2   Brachioradialis 2  2   Patellar  3  3   Achilles 3  2   Babinski up  withdrawal       MOTOR/CEREBELLAR:    Right Left   RRM 0 Normal 0 Normal   HECTOR 0 Normal 0 Normal   FTN 0 Normal 0 Normal   RRM 0 Normal 0 Normal   HKS 0 Normal 0 Normal           GAIT: Gait is   narrow-based and steady and the patient is  able to walk on heels, toes and in tandem without difficulty.    Romberg: Stable with eye(s) closed    RESULTS:  Monitoring labs:    Hospital Outpatient Visit on 05/23/2019   Component Date  Value Ref Range Status     VDRL CSF 05/23/2019 Non Reactive  Non Reactive Final     Glucose CSF 05/23/2019 58  40 - 70 mg/dL Final     Copath Report 05/23/2019    Final                    Value:Patient Name: JULIUS CASTELLANOS  MR#: [id number]  Specimen #: [id number]  Collected: 5/23/2019 11:15  Received: 5/23/2019 15:05  Reported: 5/24/2019 10:51  Ordering Phy(s): YANA OTERO    For improved result formatting, select 'View Enhanced Report Format' under   Linked Documents section.  _________________________________________    SPECIMEN(S):  Cerebrospinal fluid    INTERPRETATION:  Cerebrospinal fluid;       Rare to absent B cells       See comment    COMMENT:  There is no immunophenotypic evidence of B-cell non-Hodgkin lymphoma.   Neoplastic cells, including large cells,  may not survive specimen processing. The total number of cells is low   limiting the number of antibodies that  can be analyzed and limiting the interpretation. Only B-cell antigens were   evaluated. This sample may not be  representative. Final interpretation requires correlation with morphologic   and clinical features.    RESULTS:  Percentages reported below are based on the total number of CD45                           positive   viable leukocytes.  B cells are rare to absent    Resident/Fellow Review by:  Dr. Noel Mata    A resident/fellow in an ACGME accredited training program was involved in   the selection of testing, review of  flow scattergrams, and/or interpretation of this case. I, as the senior   physician, attest that I: (i)  confirmed appropriate testing, (ii) examined the relevant flow   scattergrams for the specimen(s); and (ii)  rendered or confirmed the interpretation(s).    ANTIBODIES:  Nine-color analyses are performed for the following markers: CD5, CD10,   CD19, CD20, CD34, CD38, CD45,and kappa  and lambda immunoglobulin light chains. Cells are gated to isolate   populations (CD45 versus side scatter  and  forward scatter versus side scatter), to exclude debris (forward scatter   versus side scatter) and to exclude  cell doublets (forward scatter height versus forward scatter width and   side scatter height versus side scatter  width). Forward scatter varies with cell size. S                          lewis scatter varies with   the amount of cytoplasmic granules.  Intensity for CD45 usually increases as hematolymphoid cells mature.    CLINICAL HISTORY:  64 year old female with abnormal cervical MRI hyperintense lesion and leg   weakness.    I have personally reviewed all specimens and/or slides, including the   listed special stains, and used them  with my medical judgment to determine the final diagnosis.    Electronically signed out by:    Elva Griffin M.D., [facility]    This test was developed and its performance characteristics determined by   [facility]. It has not been cleared or   approved by the US Food and Drug  Administration.  FDA does not require this test to go through premarket   FDA review. This test is used for  clinical purposes and should not be regarded as investigational or for   research.  This laboratory is certified  under the Clinical Laboratory Improvement Amendments (CLIA) as qualified   t                          o perform high complexity clinical  laboratory testing.    CPT Codes:  A: 88280-PE, 61634-58-VFNU84(7), 12088-WRYX4-65, 33661-34-APKR30    TESTING LAB LOCATION:  [address] [address] [phone]    COLLECTION SITE:  Client:  [facility]  Location:  XRAY (S)       Lymes IgG CSF Immunoblot 05/23/2019 Negative  Negative Final     Lymes IgM CSF Immunoblot 05/23/2019 Negative  Negative Final     Protein Total CSF 05/23/2019 26  15 - 60 mg/dL Final     Immunoglobulin Serum IgG 05/23/2019 768 415 - 7,573  mg/dL Final     Oligoclonal IgG CSF 05/23/2019 2.2  0.0 - 6.0 mg/dL Final     Albumin by Nephelometry 05/23/2019 4,080  3,500 - 5,200 mg/dL Final     Oligoclonal Albumin CSF 05/23/2019 9  0 - 35 mg/dL Final     Oligoclonal Albumin Index 05/23/2019 2.2  0.0 - 9.0 ratio Final     Oligoclonal IgG Index 05/23/2019 1.20* 0.28 - 0.66 ratio Final     CSF IgG/Albumin Ratio 05/23/2019 0.24  0.09 - 0.25 ratio Final     Oligoclonal Banding 05/23/2019 Positive* Negative Final     CSF Oligoclonal Bands Number 05/23/2019 8* 0 - 1 Bands Final     IgG Synthesis Rate 05/23/2019 3.4  <=8.0 mg/d Final     Oligoclonal Interpretation 05/23/2019 SEE NOTE   Final     Specimen Description 05/23/2019 Cerebrospinal fluid   Final     Special Requests 05/23/2019 TUBE 3   Preliminary     Culture Micro 05/23/2019 Culture negative after 1 week   Preliminary     Specimen Description 05/23/2019 Cerebrospinal fluid   Final     Special Requests 05/23/2019 TUBE 3   Final     Gram Stain 05/23/2019 No organisms seen   Final     Gram Stain 05/23/2019    Final                    Value:Rare  WBC'S seen       Gram Stain 05/23/2019 No PMNs seen   Final     Gram Stain 05/23/2019    Final                    Value:Gram stain review consistent with reported results.  Gram stain slide reviewed at the [facility]       Gram Stain 05/23/2019    Final                    Value:Quantification of host cells and microbiological organisms was done on a cytocentrifuged   preparation.       Specimen Description 05/23/2019 Cerebrospinal fluid   Final     Special Requests 05/23/2019 TUBE 3   Final     Culture Micro 05/23/2019 No growth   Final     Copath Report 05/23/2019    Final                    Value:Patient Name: JULIUS CASTELLANOS  MR#: [id number]  Specimen #: [id number]  Collected: 5/23/2019  Received: 5/24/2019  Reported: 5/24/2019 12:02  Ordering Phy(s): YANA OTERO    For improved result formatting, select 'View  Enhanced Report Format' under   Linked Documents section.    SPECIMEN/STAIN PROCESS:  Cerebrospinal Fluid       Pap-Cyto x 1, Steel's stain-cyto x 1    ----------------------------------------------------------------  CYTOLOGIC INTERPRETATION:     Cerebrospinal Fluid:   Negative for malignancy  Specimen Adequacy: Satisfactory for evaluation.    I have personally reviewed all specimens and/or slides, including the   listed special stains, and used them  with my medical judgement to determine or confirm the final diagnosis.    Electronically signed out by:    Abhinav Chow M.D.    CLINICAL HISTORY:  Hyperintense lesion and leg weakness.    ,    GROSS:  Cerebrospinal Fluid:  Received 0.8 ml of colorless, clear fluid, processed   as 1 Pap stained cytospin                           and 1  Steel stained cytospin.    MICROSCOPIC:  A microscopic examination is performed.    CPT Codes:  A: 49582-DCY, 04040-LNQROLC    COLLECTION SITE:  Client:  [facility]  Location:  [facility] (S)    The technical component of this testing was completed at the [facility], with the professional component performed   at the [facility]  [facility]atory, [address] [place] [phone] ([phone])         WBC CSF 05/23/2019 1  0 - 5 /uL Final     RBC CSF 05/23/2019 1  0 - 2 /uL Final     Tube Number 05/23/2019 4  # Final     Color CSF 05/23/2019 Colorless  CLRL^Colorless Final     Appearance CSF 05/23/2019 Clear  CLER^Clear Final     Lyme CSF [place] 05/23/2019 0.01  <=0.99 GIANNI Final     Specimen Description 05/23/2019 Cerebrospinal fluid   Final     AFB Stain 05/23/2019 Negative for acid fast bacteria   Final     AFB Stain 05/23/2019 Assayed at [facility] [address] [place] [address] [phone]   Final     ACE Angiotensin Conv Enz CSF 05/23/2019 1.5  0.0 - 2.5 U/L Final     Specimen Description 05/23/2019 Cerebrospinal fluid    Final     Culture Micro 05/23/2019    Preliminary                    Value:Culture received and in progress.  Positive AFB results are called as soon as detected.    Final report to follow in 7 to 8 weeks.       Culture Micro 05/23/2019 Assayed at [facility] [address] [place] [id number] [phone]   Preliminary   Orders Only on 05/10/2019   Component Date Value Ref Range Status     Sodium 05/10/2019 138  133 - 144 mmol/L Final     Potassium 05/10/2019 3.5  3.4 - 5.3 mmol/L Final     Chloride 05/10/2019 102  94 - 109 mmol/L Final     Carbon Dioxide 05/10/2019 28  20 - 32 mmol/L Final     Anion Gap 05/10/2019 8  3 - 14 mmol/L Final     Glucose 05/10/2019 87  70 - 99 mg/dL Final     Urea Nitrogen 05/10/2019 18  7 - 30 mg/dL Final     Creatinine 05/10/2019 0.86  0.52 - 1.04 mg/dL Final     GFR Estimate 05/10/2019 71  >60 mL/min/[1.73_m2] Final     GFR Estimate If Black 05/10/2019 82  >60 mL/min/[1.73_m2] Final     Calcium 05/10/2019 10.6* 8.5 - 10.1 mg/dL Final   Office Visit on 05/10/2019   Component Date Value Ref Range Status     Hemoglobin A1C 05/10/2019 5.7* 0 - 5.6 % Final     Vitamin E 05/10/2019 12.3  5.5 - 18.0 mg/L Final     Vitamin E Gamma 05/10/2019 1.3  0.0 - 6.0 mg/L Final     Vitamin B12 05/10/2019 1,269* 193 - 986 pg/mL Final     Methylmalonic Acid 05/10/2019 0.16  0.00 - 0.40 umol/L Final     Copper 05/10/2019 143  80 - 155 ug/dL Final     KD interpretation 05/10/2019 Negative  NEG^Negative Final     SSA (Ro) (LESLYE) Antibody, IgG 05/10/2019 <0.2  0.0 - 0.9 AI Final     SSB (La) (LESLYE) Antibody, IgG 05/10/2019 <0.2  0.0 - 0.9 AI Final   ]      MRI brain:    MRI Dated 5/7/19:  Mild to moderate  T2 disease burden with  0 enhancion lesion(s).       ASSESSMENT/PLAN:  The patient is a 64-year-old woman with a past medical history of multiple episodes of paresthesias and 2-year history of progressive lower extremity weakness who is coming as a consult to get an opinion on whether or not she  could have underlying demyelinating.  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  Her symptoms had a time pattern that is highly suggestive of relapses back in 2004 2008 in 2014.  Ideally, if one can see the MRIs from the earlier time.  It would be far easier to evaluate for this possibility.  I requested that the patient try to get her MRIs from the [facility].  Realistically I do not know how possible that will be.  The patient has one lesion in the posterior columns of the spinal cord she also has several lesions in the brain that look like the periventricular.  It would be ideal to get an flair MRI of the sagittal plane to better than not actually truly have periventricular.  The patient does have been periventricular with her history and I will and her spinal cord lesion, the diagnosis of multiple sclerosis.  At this point.  Given I think patient probably did not have MS, other consideration could be given Biotene and other demyelinating.  I will follow the patient up after she developed a frequent MRI in 1 week.  I advised the patient to call my office.    Get MRI of the brain  Follow-up in 1 week      I spent 45 minutes in this visit, with >50% direct patient time spent counseling about prognosis, treatment options, and coordination of care.    Omar Francis MD [facility]  Staff Neurologist   06/04/19       (Chart documentation was completed in part with Dragon voice-recognition software. Even though reviewed, some grammatical, spelling, and word errors may remain.)